# Patient Record
Sex: FEMALE | Race: WHITE | NOT HISPANIC OR LATINO | Employment: PART TIME | URBAN - METROPOLITAN AREA
[De-identification: names, ages, dates, MRNs, and addresses within clinical notes are randomized per-mention and may not be internally consistent; named-entity substitution may affect disease eponyms.]

---

## 2018-07-17 ENCOUNTER — HOSPITAL ENCOUNTER (INPATIENT)
Facility: MEDICAL CENTER | Age: 43
LOS: 1 days | DRG: 392 | End: 2018-07-18
Attending: EMERGENCY MEDICINE | Admitting: HOSPITALIST

## 2018-07-17 DIAGNOSIS — D72.825 BANDEMIA: ICD-10-CM

## 2018-07-17 DIAGNOSIS — R10.84 GENERALIZED ABDOMINAL PAIN: ICD-10-CM

## 2018-07-17 PROBLEM — R19.7 NAUSEA VOMITING AND DIARRHEA: Status: ACTIVE | Noted: 2018-07-17

## 2018-07-17 PROBLEM — D72.829 LEUKOCYTOSIS: Status: ACTIVE | Noted: 2018-07-17

## 2018-07-17 PROBLEM — E87.6 HYPOKALEMIA: Status: ACTIVE | Noted: 2018-07-17

## 2018-07-17 PROBLEM — R00.0 TACHYCARDIA: Status: ACTIVE | Noted: 2018-07-17

## 2018-07-17 PROBLEM — R11.2 NAUSEA VOMITING AND DIARRHEA: Status: ACTIVE | Noted: 2018-07-17

## 2018-07-17 LAB
ALBUMIN SERPL BCP-MCNC: 4.1 G/DL (ref 3.2–4.9)
ALBUMIN/GLOB SERPL: 1.1 G/DL
ALP SERPL-CCNC: 49 U/L (ref 30–99)
ALT SERPL-CCNC: 15 U/L (ref 2–50)
ANION GAP SERPL CALC-SCNC: 11 MMOL/L (ref 0–11.9)
APPEARANCE UR: ABNORMAL
AST SERPL-CCNC: 19 U/L (ref 12–45)
BASOPHILS # BLD AUTO: 0 % (ref 0–1.8)
BASOPHILS # BLD: 0 K/UL (ref 0–0.12)
BILIRUB SERPL-MCNC: 0.9 MG/DL (ref 0.1–1.5)
BUN SERPL-MCNC: 7 MG/DL (ref 8–22)
CALCIUM SERPL-MCNC: 8.9 MG/DL (ref 8.4–10.2)
CHLORIDE SERPL-SCNC: 100 MMOL/L (ref 96–112)
CO2 SERPL-SCNC: 21 MMOL/L (ref 20–33)
COLOR UR: ABNORMAL
CREAT SERPL-MCNC: 0.76 MG/DL (ref 0.5–1.4)
DEPRECATED D DIMER PPP IA-ACNC: 2812 NG/ML(D-DU)
EOSINOPHIL # BLD AUTO: 0.13 K/UL (ref 0–0.51)
EOSINOPHIL NFR BLD: 1 % (ref 0–6.9)
ERYTHROCYTE [DISTWIDTH] IN BLOOD BY AUTOMATED COUNT: 39.9 FL (ref 35.9–50)
GLOBULIN SER CALC-MCNC: 3.7 G/DL (ref 1.9–3.5)
GLUCOSE SERPL-MCNC: 96 MG/DL (ref 65–99)
GLUCOSE UR STRIP.AUTO-MCNC: NEGATIVE MG/DL
HCT VFR BLD AUTO: 44.1 % (ref 37–47)
HGB BLD-MCNC: 15.5 G/DL (ref 12–16)
KETONES UR STRIP.AUTO-MCNC: >=160 MG/DL
LACTATE BLD-SCNC: 1.1 MMOL/L (ref 0.5–2)
LEUKOCYTE ESTERASE UR QL STRIP.AUTO: ABNORMAL
LIPASE SERPL-CCNC: 18 U/L (ref 7–58)
LYMPHOCYTES # BLD AUTO: 0.64 K/UL (ref 1–4.8)
LYMPHOCYTES NFR BLD: 5 % (ref 22–41)
MANUAL DIFF BLD: ABNORMAL
MCH RBC QN AUTO: 32.2 PG (ref 27–33)
MCHC RBC AUTO-ENTMCNC: 35.1 G/DL (ref 33.6–35)
MCV RBC AUTO: 91.5 FL (ref 81.4–97.8)
METAMYELOCYTES NFR BLD MANUAL: 1 %
MONOCYTES # BLD AUTO: 0.51 K/UL (ref 0–0.85)
MONOCYTES NFR BLD AUTO: 4 % (ref 0–13.4)
NEUTROPHILS # BLD AUTO: 11.39 K/UL (ref 2–7.15)
NEUTROPHILS NFR BLD: 44 % (ref 44–72)
NEUTS BAND NFR BLD MANUAL: 45 % (ref 0–10)
NITRITE UR QL STRIP.AUTO: NEGATIVE
NRBC # BLD AUTO: 0 K/UL
NRBC BLD-RTO: 0 /100 WBC
PH UR STRIP.AUTO: 6 [PH]
PLATELET # BLD AUTO: 306 K/UL (ref 164–446)
PLATELET BLD QL SMEAR: NORMAL
PMV BLD AUTO: 11.1 FL (ref 9–12.9)
POTASSIUM SERPL-SCNC: 3.2 MMOL/L (ref 3.6–5.5)
PROT SERPL-MCNC: 7.8 G/DL (ref 6–8.2)
PROT UR QL STRIP: 100 MG/DL
RBC # BLD AUTO: 4.82 M/UL (ref 4.2–5.4)
RBC BLD AUTO: NORMAL
RBC UR QL AUTO: ABNORMAL
SODIUM SERPL-SCNC: 132 MMOL/L (ref 135–145)
SP GR UR STRIP.AUTO: 1.01
TROPONIN I SERPL-MCNC: 0.02 NG/ML (ref 0–0.04)
WBC # BLD AUTO: 12.8 K/UL (ref 4.8–10.8)

## 2018-07-17 PROCEDURE — 80053 COMPREHEN METABOLIC PANEL: CPT

## 2018-07-17 PROCEDURE — 87040 BLOOD CULTURE FOR BACTERIA: CPT

## 2018-07-17 PROCEDURE — 700105 HCHG RX REV CODE 258: Performed by: HOSPITALIST

## 2018-07-17 PROCEDURE — 770001 HCHG ROOM/CARE - MED/SURG/GYN PRIV*

## 2018-07-17 PROCEDURE — 84484 ASSAY OF TROPONIN QUANT: CPT

## 2018-07-17 PROCEDURE — 83605 ASSAY OF LACTIC ACID: CPT

## 2018-07-17 PROCEDURE — 81002 URINALYSIS NONAUTO W/O SCOPE: CPT

## 2018-07-17 PROCEDURE — A9270 NON-COVERED ITEM OR SERVICE: HCPCS | Performed by: HOSPITALIST

## 2018-07-17 PROCEDURE — 99285 EMERGENCY DEPT VISIT HI MDM: CPT

## 2018-07-17 PROCEDURE — 96361 HYDRATE IV INFUSION ADD-ON: CPT

## 2018-07-17 PROCEDURE — 700105 HCHG RX REV CODE 258: Performed by: EMERGENCY MEDICINE

## 2018-07-17 PROCEDURE — 85007 BL SMEAR W/DIFF WBC COUNT: CPT

## 2018-07-17 PROCEDURE — 700101 HCHG RX REV CODE 250: Performed by: EMERGENCY MEDICINE

## 2018-07-17 PROCEDURE — 700102 HCHG RX REV CODE 250 W/ 637 OVERRIDE(OP): Performed by: HOSPITALIST

## 2018-07-17 PROCEDURE — 700111 HCHG RX REV CODE 636 W/ 250 OVERRIDE (IP): Performed by: HOSPITALIST

## 2018-07-17 PROCEDURE — 36415 COLL VENOUS BLD VENIPUNCTURE: CPT

## 2018-07-17 PROCEDURE — 83690 ASSAY OF LIPASE: CPT

## 2018-07-17 PROCEDURE — 700111 HCHG RX REV CODE 636 W/ 250 OVERRIDE (IP): Performed by: EMERGENCY MEDICINE

## 2018-07-17 PROCEDURE — 85027 COMPLETE CBC AUTOMATED: CPT

## 2018-07-17 PROCEDURE — 96365 THER/PROPH/DIAG IV INF INIT: CPT

## 2018-07-17 PROCEDURE — 85379 FIBRIN DEGRADATION QUANT: CPT

## 2018-07-17 PROCEDURE — 96375 TX/PRO/DX INJ NEW DRUG ADDON: CPT

## 2018-07-17 PROCEDURE — 99221 1ST HOSP IP/OBS SF/LOW 40: CPT | Performed by: HOSPITALIST

## 2018-07-17 PROCEDURE — 93005 ELECTROCARDIOGRAM TRACING: CPT | Performed by: EMERGENCY MEDICINE

## 2018-07-17 RX ORDER — AMOXICILLIN 250 MG
2 CAPSULE ORAL 2 TIMES DAILY
Status: DISCONTINUED | OUTPATIENT
Start: 2018-07-17 | End: 2018-07-18 | Stop reason: HOSPADM

## 2018-07-17 RX ORDER — SODIUM CHLORIDE 9 MG/ML
INJECTION, SOLUTION INTRAVENOUS CONTINUOUS
Status: DISCONTINUED | OUTPATIENT
Start: 2018-07-17 | End: 2018-07-18 | Stop reason: HOSPADM

## 2018-07-17 RX ORDER — SODIUM CHLORIDE 9 MG/ML
1000 INJECTION, SOLUTION INTRAVENOUS ONCE
Status: COMPLETED | OUTPATIENT
Start: 2018-07-17 | End: 2018-07-17

## 2018-07-17 RX ORDER — DIPHENHYDRAMINE HCL 25 MG
25 TABLET ORAL NIGHTLY PRN
COMMUNITY
End: 2020-08-13

## 2018-07-17 RX ORDER — MORPHINE SULFATE 4 MG/ML
4 INJECTION, SOLUTION INTRAMUSCULAR; INTRAVENOUS ONCE
Status: COMPLETED | OUTPATIENT
Start: 2018-07-17 | End: 2018-07-17

## 2018-07-17 RX ORDER — MORPHINE SULFATE 4 MG/ML
1 INJECTION, SOLUTION INTRAMUSCULAR; INTRAVENOUS
Status: DISCONTINUED | OUTPATIENT
Start: 2018-07-17 | End: 2018-07-18 | Stop reason: HOSPADM

## 2018-07-17 RX ORDER — ONDANSETRON 2 MG/ML
4 INJECTION INTRAMUSCULAR; INTRAVENOUS ONCE
Status: COMPLETED | OUTPATIENT
Start: 2018-07-17 | End: 2018-07-17

## 2018-07-17 RX ORDER — POLYETHYLENE GLYCOL 3350 17 G/17G
1 POWDER, FOR SOLUTION ORAL
Status: DISCONTINUED | OUTPATIENT
Start: 2018-07-17 | End: 2018-07-18 | Stop reason: HOSPADM

## 2018-07-17 RX ORDER — ONDANSETRON 2 MG/ML
4 INJECTION INTRAMUSCULAR; INTRAVENOUS EVERY 4 HOURS PRN
Status: DISCONTINUED | OUTPATIENT
Start: 2018-07-17 | End: 2018-07-18 | Stop reason: HOSPADM

## 2018-07-17 RX ORDER — ACETAMINOPHEN 325 MG/1
650 TABLET ORAL EVERY 6 HOURS PRN
Status: DISCONTINUED | OUTPATIENT
Start: 2018-07-17 | End: 2018-07-18 | Stop reason: HOSPADM

## 2018-07-17 RX ORDER — BISACODYL 10 MG
10 SUPPOSITORY, RECTAL RECTAL
Status: DISCONTINUED | OUTPATIENT
Start: 2018-07-17 | End: 2018-07-18 | Stop reason: HOSPADM

## 2018-07-17 RX ORDER — POTASSIUM CHLORIDE 7.45 MG/ML
10 INJECTION INTRAVENOUS
Status: COMPLETED | OUTPATIENT
Start: 2018-07-17 | End: 2018-07-17

## 2018-07-17 RX ORDER — LEVOFLOXACIN 5 MG/ML
750 INJECTION, SOLUTION INTRAVENOUS ONCE
Status: COMPLETED | OUTPATIENT
Start: 2018-07-17 | End: 2018-07-17

## 2018-07-17 RX ORDER — HEPARIN SODIUM 5000 [USP'U]/ML
5000 INJECTION, SOLUTION INTRAVENOUS; SUBCUTANEOUS EVERY 8 HOURS
Status: DISCONTINUED | OUTPATIENT
Start: 2018-07-17 | End: 2018-07-18 | Stop reason: HOSPADM

## 2018-07-17 RX ADMIN — ONDANSETRON 4 MG: 2 INJECTION INTRAMUSCULAR; INTRAVENOUS at 15:55

## 2018-07-17 RX ADMIN — POTASSIUM CHLORIDE 10 MEQ: 7.46 INJECTION, SOLUTION INTRAVENOUS at 19:37

## 2018-07-17 RX ADMIN — MORPHINE SULFATE 4 MG: 4 INJECTION INTRAVENOUS at 15:55

## 2018-07-17 RX ADMIN — POTASSIUM CHLORIDE 10 MEQ: 7.46 INJECTION, SOLUTION INTRAVENOUS at 22:36

## 2018-07-17 RX ADMIN — LEVOFLOXACIN 750 MG: 5 INJECTION, SOLUTION INTRAVENOUS at 20:42

## 2018-07-17 RX ADMIN — POTASSIUM CHLORIDE 10 MEQ: 7.46 INJECTION, SOLUTION INTRAVENOUS at 20:33

## 2018-07-17 RX ADMIN — ACETAMINOPHEN 650 MG: 325 TABLET, FILM COATED ORAL at 19:35

## 2018-07-17 RX ADMIN — MORPHINE SULFATE 1 MG: 4 INJECTION INTRAVENOUS at 21:31

## 2018-07-17 RX ADMIN — SODIUM CHLORIDE 1000 ML: 9 INJECTION, SOLUTION INTRAVENOUS at 18:03

## 2018-07-17 RX ADMIN — POTASSIUM CHLORIDE 10 MEQ: 7.46 INJECTION, SOLUTION INTRAVENOUS at 21:35

## 2018-07-17 RX ADMIN — METRONIDAZOLE 500 MG: 500 INJECTION, SOLUTION INTRAVENOUS at 18:03

## 2018-07-17 RX ADMIN — ONDANSETRON 4 MG: 2 INJECTION INTRAMUSCULAR; INTRAVENOUS at 22:00

## 2018-07-17 RX ADMIN — SODIUM CHLORIDE 1000 ML: 9 INJECTION, SOLUTION INTRAVENOUS at 15:30

## 2018-07-17 ASSESSMENT — ENCOUNTER SYMPTOMS
COUGH: 0
WEAKNESS: 1
SEIZURES: 0
DIZZINESS: 0
FEVER: 0
BLOOD IN STOOL: 0
BLURRED VISION: 0
HEADACHES: 0
FOCAL WEAKNESS: 0
NAUSEA: 1
DIARRHEA: 1
LOSS OF CONSCIOUSNESS: 0
SHORTNESS OF BREATH: 0
EYE PAIN: 0
VOMITING: 1
ABDOMINAL PAIN: 1

## 2018-07-17 ASSESSMENT — PAIN SCALES - GENERAL
PAINLEVEL_OUTOF10: 8

## 2018-07-17 ASSESSMENT — COGNITIVE AND FUNCTIONAL STATUS - GENERAL
DAILY ACTIVITIY SCORE: 24
SUGGESTED CMS G CODE MODIFIER MOBILITY: CH
SUGGESTED CMS G CODE MODIFIER DAILY ACTIVITY: CH
MOBILITY SCORE: 24

## 2018-07-17 ASSESSMENT — LIFESTYLE VARIABLES
TOTAL SCORE: 0
TOTAL SCORE: 0
HAVE PEOPLE ANNOYED YOU BY CRITICIZING YOUR DRINKING: NO
HAVE YOU EVER FELT YOU SHOULD CUT DOWN ON YOUR DRINKING: NO
EVER HAD A DRINK FIRST THING IN THE MORNING TO STEADY YOUR NERVES TO GET RID OF A HANGOVER: NO
CONSUMPTION TOTAL: NEGATIVE
ON A TYPICAL DAY WHEN YOU DRINK ALCOHOL HOW MANY DRINKS DO YOU HAVE: 1
HOW MANY TIMES IN THE PAST YEAR HAVE YOU HAD 5 OR MORE DRINKS IN A DAY: 0
EVER FELT BAD OR GUILTY ABOUT YOUR DRINKING: NO
TOTAL SCORE: 0
AVERAGE NUMBER OF DAYS PER WEEK YOU HAVE A DRINK CONTAINING ALCOHOL: 1
ALCOHOL_USE: YES
EVER_SMOKED: NEVER

## 2018-07-17 ASSESSMENT — PATIENT HEALTH QUESTIONNAIRE - PHQ9
1. LITTLE INTEREST OR PLEASURE IN DOING THINGS: NOT AT ALL
SUM OF ALL RESPONSES TO PHQ9 QUESTIONS 1 AND 2: 0
2. FEELING DOWN, DEPRESSED, IRRITABLE, OR HOPELESS: NOT AT ALL

## 2018-07-17 NOTE — ED PROVIDER NOTES
"ED Provider Note  CHIEF COMPLAINT  Chief Complaint   Patient presents with   • Abdominal Pain   • N/V   • Diarrhea       HPI  Mary Galo is a 42 y.o. female who presents planing of some intermittent crampy abdominal pain associated with nausea, vomiting, diarrhea.  Both her and her mother eats locks or uncooked salmon a few days ago and both have had similar symptoms but the mother did not have quite as severe the symptoms.  Patient still has her gallbladder and appendix.  No localizing pain.  It is kind of diffuse intermittent crampy.  She has had some general muscle aches in her upper back.  Non-smoker.  No history of heart disease.  No diabetes.    REVIEW OF SYSTEMS  No headache, no jaw pain, no urinary symptoms.    ALL OTHER SYSTEMS NEGATIVE    ALLERGIES  No Known Allergies    CURRENT MEDICATIONS  Home Medications     Reviewed by Bakari Alexander (Pharmacy Tech) on 07/17/18 at 1504  Med List Status: Complete   Medication Last Dose Status   diphenhydrAMINE (BENADRYL) 25 MG Tab 7/16/2018 Active   MELATONIN PO 7/16/2018 Active                PAST MEDICAL HISTORY  Negative    SURGICAL HISTORY  No previous abdominal surgery    FAMILY HISTORY  Negative    SOCIAL HISTORY  Negative    PHYSICAL EXAM  GENERAL: Al alert dehydrated female with dry mucous membranes.  VITAL SIGNS: /69   Pulse (!) 103   Temp 37.3 °C (99.1 °F)   Resp 18   Ht 1.626 m (5' 4\")   Wt 70.5 kg (155 lb 6.8 oz)   SpO2 96%   BMI 26.68 kg/m²   Constitutional: Alert dehydrated female with dry mucous membranes.  Adult   HENT: Scalp is normal size and nontender. Ears are clear. Nose is clear. Throat is clear with no stridor no drooling no trismus. Teeth are all intact.  Eyes: Pupils equal round and reactive to light, extraocular motor fall. There is no scleral icterus.  Neck: Neck is supple and nontender. There is no meningismus. No adenitis. No thyromegaly.  Lymphatic: No adenopathy.   Cardiovascular: Heart regular rhythm without " murmurs or gallops   Thorax & Lungs: No chest wall tenderness. Lungs are clear. Patient has good breath sounds bilateral. No rales, no rhonchi, no wheezes.  Abdomen: Abdomen is soft, nontender, not rigid, no guarding, and no organomegaly. There is no palpable hernia   Skin: Warm, pink, and dry with no erythema and no rash.   Back: Nontender, no midline bony tenderness, no flank tenderness.  Genitalia no lower abdominal pain and no lower abdominal tenderness and no vaginal bleeding no vaginal discharge.  Pelvic exam has been deferred..                                               Extremities: Full range of motion  No tenderness to palpation and no deformities noted. No calf or thigh swelling. No calf or thigh tenderness. No clinical DVT.  Neurologic: Alert & oriented . Cranial nerves are grossly intact as tested. Patient moves all 4 extremities well. Patient has good strong flexion and extension of the ankle joints knee joints hip joints and elbow joints. Sensation is normal and symmetrical in the upper and lower extremities.   Psychiatric: Patient is alert oriented coherent and rational.     COURSE & MEDICAL DECISION MAKING  Patient had some uncooked salmon a few days ago and has had some nausea, vomiting, diarrhea, with abdominal cramps over the last few days.  Differential diagnosis: Abdominal pain, gastritis, hepatitis, pancreatitis, irritable bowel syndrome, colitis, enteritis, etc.    Plan: #1 IV #2 bolus of IV fluids for rehydration.  The patient appears dehydrated with dry mucous membranes.  3.  Intravenous Zofran and morphine.  4.  Lab evaluation including CBC, CMP, lipase, troponin, chest x-ray and EKG.    Laboratory and reexamination: The patient responded well to the bolus of IV fluids for her dehydration.  She appears much better hydrated.  White count is 13,000.  No anemia patient has a significant bandemia of 45%.  Chemistry panel is unremarkable with no renal or liver dysfunction.  Ponin is negative.   Lipase is normal no pancreatitis.    Results for orders placed or performed during the hospital encounter of 07/17/18   CBC WITH DIFFERENTIAL   Result Value Ref Range    WBC 12.8 (H) 4.8 - 10.8 K/uL    RBC 4.82 4.20 - 5.40 M/uL    Hemoglobin 15.5 12.0 - 16.0 g/dL    Hematocrit 44.1 37.0 - 47.0 %    MCV 91.5 81.4 - 97.8 fL    MCH 32.2 27.0 - 33.0 pg    MCHC 35.1 (H) 33.6 - 35.0 g/dL    RDW 39.9 35.9 - 50.0 fL    Platelet Count 306 164 - 446 K/uL    MPV 11.1 9.0 - 12.9 fL    Nucleated RBC 0.00 /100 WBC    NRBC (Absolute) 0.00 K/uL    Neutrophils-Polys 44.00 44.00 - 72.00 %    Lymphocytes 5.00 (L) 22.00 - 41.00 %    Monocytes 4.00 0.00 - 13.40 %    Eosinophils 1.00 0.00 - 6.90 %    Basophils 0.00 0.00 - 1.80 %    Neutrophils (Absolute) 11.39 (H) 2.00 - 7.15 K/uL    Lymphs (Absolute) 0.64 (L) 1.00 - 4.80 K/uL    Monos (Absolute) 0.51 0.00 - 0.85 K/uL    Eos (Absolute) 0.13 0.00 - 0.51 K/uL    Baso (Absolute) 0.00 0.00 - 0.12 K/uL   COMP METABOLIC PANEL   Result Value Ref Range    Sodium 132 (L) 135 - 145 mmol/L    Potassium 3.2 (L) 3.6 - 5.5 mmol/L    Chloride 100 96 - 112 mmol/L    Co2 21 20 - 33 mmol/L    Anion Gap 11.0 0.0 - 11.9    Glucose 96 65 - 99 mg/dL    Bun 7 (L) 8 - 22 mg/dL    Creatinine 0.76 0.50 - 1.40 mg/dL    Calcium 8.9 8.4 - 10.2 mg/dL    AST(SGOT) 19 12 - 45 U/L    ALT(SGPT) 15 2 - 50 U/L    Alkaline Phosphatase 49 30 - 99 U/L    Total Bilirubin 0.9 0.1 - 1.5 mg/dL    Albumin 4.1 3.2 - 4.9 g/dL    Total Protein 7.8 6.0 - 8.2 g/dL    Globulin 3.7 (H) 1.9 - 3.5 g/dL    A-G Ratio 1.1 g/dL   DIFFERENTIAL MANUAL   Result Value Ref Range    Bands-Stabs 45.00 (H) 0.00 - 10.00 %    Metamyelocytes 1.00 %    Manual Diff Status PERFORMED    PLATELET ESTIMATE   Result Value Ref Range    Plt Estimation Normal    MORPHOLOGY   Result Value Ref Range    RBC Morphology Normal    ESTIMATED GFR   Result Value Ref Range    GFR If African American >60 >60 mL/min/1.73 m 2    GFR If Non African American >60 >60 mL/min/1.73  m 2   LIPASE   Result Value Ref Range    Lipase 18 7 - 58 U/L   TROPONIN   Result Value Ref Range    Troponin I 0.02 0.00 - 0.04 ng/mL   POC UA   Result Value Ref Range    POC Color Tania     POC Appearance Slightly Cloudy (A)     POC Glucose Negative Negative mg/dL    POC Ketones >=160 (A) Negative mg/dL    POC Specific Gravity 1.010 1.005 - 1.030    POC Blood Moderate (A) Negative    POC Urine PH 6.0 5.0 - 8.0    POC Protein 100 (A) Negative mg/dL    POC Nitrites Negative Negative    POC Leukocyte Esterase Trace (A) Negative      Because of the elevated white count and the significant bandemia and the dehydration the patient will be admitted for further evaluation.  Hospitalist has been called the case discussed.  The patient stable on admission after 5 PM.  FINAL IMPRESSION  1.  Vomiting and diarrhea  2.  Abdominal cramps  3.  Significant bandemia  4.  Dehydration      Electronically signed by: Gary Gansert, 7/17/2018 /5 PM PM

## 2018-07-17 NOTE — ED NOTES
Rounded on patient in lobby.  Apologized for wait and assured that she would be roomed as soon as possible.

## 2018-07-18 VITALS
WEIGHT: 155.42 LBS | TEMPERATURE: 98.5 F | SYSTOLIC BLOOD PRESSURE: 96 MMHG | RESPIRATION RATE: 17 BRPM | HEART RATE: 77 BPM | BODY MASS INDEX: 26.53 KG/M2 | HEIGHT: 64 IN | OXYGEN SATURATION: 98 % | DIASTOLIC BLOOD PRESSURE: 53 MMHG

## 2018-07-18 PROBLEM — D72.829 LEUKOCYTOSIS: Status: RESOLVED | Noted: 2018-07-17 | Resolved: 2018-07-18

## 2018-07-18 PROBLEM — R00.0 TACHYCARDIA: Status: RESOLVED | Noted: 2018-07-17 | Resolved: 2018-07-18

## 2018-07-18 PROBLEM — E87.6 HYPOKALEMIA: Status: RESOLVED | Noted: 2018-07-17 | Resolved: 2018-07-18

## 2018-07-18 PROBLEM — R19.7 NAUSEA VOMITING AND DIARRHEA: Status: RESOLVED | Noted: 2018-07-17 | Resolved: 2018-07-18

## 2018-07-18 PROBLEM — R11.2 NAUSEA VOMITING AND DIARRHEA: Status: RESOLVED | Noted: 2018-07-17 | Resolved: 2018-07-18

## 2018-07-18 PROBLEM — R10.84 GENERALIZED ABDOMINAL PAIN: Status: RESOLVED | Noted: 2018-07-17 | Resolved: 2018-07-18

## 2018-07-18 LAB
ALBUMIN SERPL BCP-MCNC: 2.7 G/DL (ref 3.2–4.9)
ALBUMIN/GLOB SERPL: 1 G/DL
ALP SERPL-CCNC: 35 U/L (ref 30–99)
ALT SERPL-CCNC: 10 U/L (ref 2–50)
ANION GAP SERPL CALC-SCNC: 6 MMOL/L (ref 0–11.9)
AST SERPL-CCNC: 12 U/L (ref 12–45)
BILIRUB SERPL-MCNC: 0.7 MG/DL (ref 0.1–1.5)
BUN SERPL-MCNC: <5 MG/DL (ref 8–22)
CALCIUM SERPL-MCNC: 7.2 MG/DL (ref 8.4–10.2)
CHLORIDE SERPL-SCNC: 111 MMOL/L (ref 96–112)
CO2 SERPL-SCNC: 18 MMOL/L (ref 20–33)
CREAT SERPL-MCNC: 0.53 MG/DL (ref 0.5–1.4)
ERYTHROCYTE [DISTWIDTH] IN BLOOD BY AUTOMATED COUNT: 42.2 FL (ref 35.9–50)
GLOBULIN SER CALC-MCNC: 2.7 G/DL (ref 1.9–3.5)
GLUCOSE SERPL-MCNC: 81 MG/DL (ref 65–99)
HCT VFR BLD AUTO: 34.8 % (ref 37–47)
HGB BLD-MCNC: 12.1 G/DL (ref 12–16)
MCH RBC QN AUTO: 31.9 PG (ref 27–33)
MCHC RBC AUTO-ENTMCNC: 34.4 G/DL (ref 33.6–35)
MCV RBC AUTO: 92.8 FL (ref 81.4–97.8)
PLATELET # BLD AUTO: 245 K/UL (ref 164–446)
PMV BLD AUTO: 11 FL (ref 9–12.9)
POTASSIUM SERPL-SCNC: 3.3 MMOL/L (ref 3.6–5.5)
PROT SERPL-MCNC: 5.4 G/DL (ref 6–8.2)
RBC # BLD AUTO: 3.73 M/UL (ref 4.2–5.4)
SODIUM SERPL-SCNC: 135 MMOL/L (ref 135–145)
WBC # BLD AUTO: 9.2 K/UL (ref 4.8–10.8)

## 2018-07-18 PROCEDURE — 700102 HCHG RX REV CODE 250 W/ 637 OVERRIDE(OP): Performed by: HOSPITALIST

## 2018-07-18 PROCEDURE — A9270 NON-COVERED ITEM OR SERVICE: HCPCS | Performed by: HOSPITALIST

## 2018-07-18 PROCEDURE — 80053 COMPREHEN METABOLIC PANEL: CPT

## 2018-07-18 PROCEDURE — 700111 HCHG RX REV CODE 636 W/ 250 OVERRIDE (IP): Performed by: HOSPITALIST

## 2018-07-18 PROCEDURE — 700105 HCHG RX REV CODE 258: Performed by: HOSPITALIST

## 2018-07-18 PROCEDURE — 87045 FECES CULTURE AEROBIC BACT: CPT

## 2018-07-18 PROCEDURE — 87899 AGENT NOS ASSAY W/OPTIC: CPT

## 2018-07-18 PROCEDURE — 85027 COMPLETE CBC AUTOMATED: CPT

## 2018-07-18 PROCEDURE — 99239 HOSP IP/OBS DSCHRG MGMT >30: CPT | Performed by: HOSPITALIST

## 2018-07-18 PROCEDURE — 87046 STOOL CULTR AEROBIC BACT EA: CPT

## 2018-07-18 RX ORDER — SODIUM CHLORIDE 9 MG/ML
1000 INJECTION, SOLUTION INTRAVENOUS ONCE
Status: COMPLETED | OUTPATIENT
Start: 2018-07-18 | End: 2018-07-18

## 2018-07-18 RX ORDER — SODIUM CHLORIDE 9 MG/ML
500 INJECTION, SOLUTION INTRAVENOUS ONCE
Status: COMPLETED | OUTPATIENT
Start: 2018-07-18 | End: 2018-07-18

## 2018-07-18 RX ADMIN — MORPHINE SULFATE 1 MG: 4 INJECTION INTRAVENOUS at 04:42

## 2018-07-18 RX ADMIN — SODIUM CHLORIDE 1000 ML: 9 INJECTION, SOLUTION INTRAVENOUS at 16:47

## 2018-07-18 RX ADMIN — ONDANSETRON 4 MG: 2 INJECTION INTRAMUSCULAR; INTRAVENOUS at 07:54

## 2018-07-18 RX ADMIN — MORPHINE SULFATE 1 MG: 4 INJECTION INTRAVENOUS at 07:54

## 2018-07-18 RX ADMIN — SODIUM CHLORIDE 500 ML: 9 INJECTION, SOLUTION INTRAVENOUS at 03:33

## 2018-07-18 RX ADMIN — SODIUM CHLORIDE: 9 INJECTION, SOLUTION INTRAVENOUS at 01:16

## 2018-07-18 RX ADMIN — SODIUM CHLORIDE: 9 INJECTION, SOLUTION INTRAVENOUS at 09:35

## 2018-07-18 RX ADMIN — ACETAMINOPHEN 650 MG: 325 TABLET, FILM COATED ORAL at 11:54

## 2018-07-18 ASSESSMENT — PAIN SCALES - GENERAL
PAINLEVEL_OUTOF10: 8
PAINLEVEL_OUTOF10: 9

## 2018-07-18 NOTE — PROGRESS NOTES
Pt tolerating full liquid diet without any n/v. States pain to abdomen tolerable. Wanting to DC home with . BPs 90s/40s. VSS otherwise. MD ordered 1 L bolus NS.

## 2018-07-18 NOTE — DIETARY
Nutrition services: Day 1 of admit.  Mary Galo is a 42 y.o. female with admitting DX of N/V/D.    Consult received for Poor PO PTA.     Per Chart review, pt presented with N/V/D since Saturday when she and her mother consumed raw salmon and most likely the cause of the poor PO PTA. Both patients presented to acute though pt's mothers are not as severe. MD assessment and plan, concern for vibrio, plan to use antiemetics PRN, IVF hydration, pain control and NPO at this time RT N/V/D day #1. RD to follow clinical course and await diet advancement.     Labs (Alb 2.7 L K 3.3 L Ca 7.2 L BUN <5 L), MAR and Chart reviewed.     Malnutrition Risk: poor PO PTA per pt     Recommendations/Plan:  1. Nutrition per MD discretion.   2. Encourage intake of 50% or greater when diet advanced.   3. Document intake of all meals  as % taken in ADL's to provide interdisciplinary communication across all shifts.   4. Monitor weight.  5. Nutrition rep will continue to see patient for ongoing meal and snack preferences when diet advanced.

## 2018-07-18 NOTE — CARE PLAN
Problem: Nutritional:  Goal: Achieve adequate nutritional intake  Patient will consume 50% or greater of meals when diet advanced.   Outcome: NOT MET

## 2018-07-18 NOTE — ASSESSMENT & PLAN NOTE
Likely from acute infection  Hold off on abx at this time, patient otherwise stable  Repeat labs in the am

## 2018-07-18 NOTE — ASSESSMENT & PLAN NOTE
Patient coming in with acute nausea, vomiting and diarrhea for the past 2-3 days, states she had some raw fish for dinner with her daughter few days ago, otherwise healthy, no PMHx, no blood in stool noted  Likely from consuming uncooked raw fish- concern for Vibrio parahaemolyticus- self limiting  At this time admit for IV fluids and electrolyte hydration  Antiemetics PRN  Monitor closely

## 2018-07-18 NOTE — H&P
Hospital Medicine History and Physical    Date of Service  7/17/2018    Chief Complaint  Chief Complaint   Patient presents with   • Abdominal Pain   • N/V   • Diarrhea       History of Presenting Illness  42 y.o. female who presented 7/17/2018 with nausea/vomiting and diarrhea x 2-3 days. She and her mother consumed raw salmon a few days ago and both have similar symptoms however her mothers isn't as severe. She has not been able to keep anything down, feeling weak, having generalized crampy abd pain, 6/10, non radiaiting. No aggravating or relieving factors.   No fevers, no chills, no blood in stool      Primary Care Physician  No primary care provider on file.    Consultants  none    Code Status  full    Review of Systems  Review of Systems   Constitutional: Positive for malaise/fatigue. Negative for fever.   HENT: Negative for congestion and hearing loss.    Eyes: Negative for blurred vision and pain.   Respiratory: Negative for cough and shortness of breath.    Cardiovascular: Negative for chest pain and leg swelling.   Gastrointestinal: Positive for abdominal pain, diarrhea, nausea and vomiting. Negative for blood in stool.   Genitourinary: Negative for dysuria and frequency.   Neurological: Positive for weakness. Negative for dizziness, focal weakness, seizures, loss of consciousness and headaches.        Past Medical History  No past medical history on file.  Reviewed and no pmhx noted per patient  Surgical History  No past surgical history on file.  No hx of surgeries in the past  Medications  No current facility-administered medications on file prior to encounter.      No current outpatient prescriptions on file prior to encounter.   takes melatonin for sleep as needed    Family History  No family history on file.  Reviewed with patient and non contributory  Social History  Social History   Substance Use Topics   • Smoking status: Not on file   • Smokeless tobacco: Not on file   • Alcohol use Not on file    does not smoke, social drinks, no illicit drugs    Allergies  No Known Allergies     Physical Exam  Laboratory   Hemodynamics  Temp (24hrs), Av.3 °C (99.1 °F), Min:37.3 °C (99.1 °F), Max:37.3 °C (99.1 °F)   Temperature: 37.3 °C (99.1 °F)  Pulse  Av  Min: 103  Max: 103    Blood Pressure: 104/69      Respiratory      Respiration: 18, Pulse Oximetry: 96 %             Physical Exam   Constitutional: She is oriented to person, place, and time. She appears well-developed and well-nourished. No distress.   HENT:   Head: Normocephalic and atraumatic.   Eyes: Conjunctivae and EOM are normal. Pupils are equal, round, and reactive to light. No scleral icterus.   Neck: Normal range of motion. Neck supple. No JVD present. No thyromegaly present.   Cardiovascular: Regular rhythm.    No murmur heard.  tachycardia   Pulmonary/Chest: Effort normal and breath sounds normal. No respiratory distress. She has no wheezes. She has no rales.   Abdominal: Soft. Bowel sounds are normal. She exhibits no distension. There is tenderness. There is no rebound.   Musculoskeletal: Normal range of motion. She exhibits no edema or tenderness.   Lymphadenopathy:     She has no cervical adenopathy.   Neurological: She is alert and oriented to person, place, and time. She exhibits normal muscle tone.   Skin: Skin is warm and dry. No rash noted. No erythema.   Psychiatric: She has a normal mood and affect.       Recent Labs      18   1351   WBC  12.8*   RBC  4.82   HEMOGLOBIN  15.5   HEMATOCRIT  44.1   MCV  91.5   MCH  32.2   MCHC  35.1*   RDW  39.9   PLATELETCT  306   MPV  11.1     Recent Labs      18   1351   SODIUM  132*   POTASSIUM  3.2*   CHLORIDE  100   CO2  21   GLUCOSE  96   BUN  7*   CREATININE  0.76   CALCIUM  8.9     Recent Labs      18   1351   ALTSGPT  15   ASTSGOT  19   ALKPHOSPHAT  49   TBILIRUBIN  0.9   LIPASE  18   GLUCOSE  96                 Lab Results   Component Value Date    TROPONINI 0.02 2018      Urinalysis:  No results found for: SPECGRAVITY, GLUCOSEUR, KETONES, NITRITE, WBCURINE, RBCURINE, BACTERIA, EPITHELCELL     Imaging  none   Assessment/Plan     I anticipate this patient will require at least two midnights for appropriate medical management, necessitating inpatient admission.    * Nausea vomiting and diarrhea   Assessment & Plan    Patient coming in with acute nausea, vomiting and diarrhea for the past 2-3 days, states she had some raw fish for dinner with her daughter few days ago, otherwise healthy, no PMHx, no blood in stool noted  Likely from consuming uncooked raw fish- concern for Vibrio parahaemolyticus- self limiting  At this time admit for IV fluids and electrolyte hydration  Antiemetics PRN  Monitor closely            Generalized abdominal pain   Assessment & Plan    Cramping abd pain after consuming raw fish a few days ago, + N/V/D as well  Pain control  IVF hydration  monitor        Tachycardia   Assessment & Plan    2/2 dehydration from the above  IVF  monitor        Hypokalemia   Assessment & Plan    2/2 N/V/D,   Replaced and monitor        Leukocytosis   Assessment & Plan    Likely from acute infection  Hold off on abx at this time, patient otherwise stable  Repeat labs in the am            VTE prophylaxis: heparin

## 2018-07-18 NOTE — CARE PLAN
Problem: Safety  Goal: Will remain free from falls  Outcome: PROGRESSING AS EXPECTED  Pt expressed some dizziness and weakness. Discussed with patient to use call light before getting out of bed and wait for assistance.     Problem: Knowledge Deficit  Goal: Knowledge of disease process/condition, treatment plan, diagnostic tests, and medications will improve  Outcome: PROGRESSING AS EXPECTED  Discussed plan of care with patient and family members. All questions answered.

## 2018-07-18 NOTE — ASSESSMENT & PLAN NOTE
Cramping abd pain after consuming raw fish a few days ago, + N/V/D as well  Pain control  IVF hydration  monitor

## 2018-07-19 LAB
E COLI SXT1+2 STL IA: NORMAL
SIGNIFICANT IND 70042: NORMAL
SITE SITE: NORMAL
SOURCE SOURCE: NORMAL

## 2018-07-19 NOTE — DISCHARGE INSTRUCTIONS
Discharge Instructions    Discharged to home by car with relative. Discharged via wheelchair, hospital escort: Yes.  Special equipment needed: Not Applicable    Be sure to schedule a follow-up appointment with your primary care doctor or any specialists as instructed.     Discharge Plan:   Diet Plan: Discussed  Activity Level: Discussed  Confirmed Follow up Appointment: Patient to Call and Schedule Appointment  Confirmed Symptoms Management: Discussed  Medication Reconciliation Updated: Yes  Influenza Vaccine Indication: Patient Refuses    I understand that a diet low in cholesterol, fat, and sodium is recommended for good health. Unless I have been given specific instructions below for another diet, I accept this instruction as my diet prescription.   Other diet: as tolerated. Start with clear liquids    Special Instructions: None    · Is patient discharged on Warfarin / Coumadin?   No     Depression / Suicide Risk    As you are discharged from this [facility] facility, it is important to learn how to keep safe from harming yourself.    Recognize the warning signs:  · Abrupt changes in personality, positive or negative- including increase in energy   · Giving away possessions  · Change in eating patterns- significant weight changes-  positive or negative  · Change in sleeping patterns- unable to sleep or sleeping all the time   · Unwillingness or inability to communicate  · Depression  · Unusual sadness, discouragement and loneliness  · Talk of wanting to die  · Neglect of personal appearance   · Rebelliousness- reckless behavior  · Withdrawal from people/activities they love  · Confusion- inability to concentrate     If you or a loved one observes any of these behaviors or has concerns about self-harm, here's what you can do:  · Talk about it- your feelings and reasons for harming yourself  · Remove any means that you might use to hurt yourself (examples: pills, rope, extension cords, firearm)  · Get  professional help from the community (Mental Health, Substance Abuse, psychological counseling)  · Do not be alone:Call your Safe Contact- someone whom you trust who will be there for you.  · Call your local CRISIS HOTLINE 727-6630 or 302-084-7616  · Call your local Children's Mobile Crisis Response Team [place] (877) 721-4663 or www.[employer] [place]  · Call the toll free National Suicide Prevention Hotlines   · National Suicide Prevention Lifeline 332-881-JGUT (8293)  · National Hope Line Network 800-SUICIDE (683-7417)

## 2018-07-19 NOTE — PROGRESS NOTES
Pt given all discharge information and education. Pt still with bouts of diarrhea, nausea and vomiting resolved. Tolerating clear liquid diet. Transferred home with  and mother.

## 2018-07-19 NOTE — DISCHARGE SUMMARY
Discharge Summary    CHIEF COMPLAINT ON ADMISSION  Chief Complaint   Patient presents with   • Abdominal Pain   • N/V   • Diarrhea       Reason for Admission  food poisioning; back pain; diarrha    Admission Date  7/17/2018    CODE STATUS  Full Code    HPI & HOSPITAL COURSE  This is a 42 y.o. female with no significant past medical history presented to the ER with complaint of nausea vomiting and diarrhea that started about 2-3 days ago after patient apparently had some kind of overall fish for dinner. At the time when she started having diarrhea she denied any blood or mucus in her stools. Nevertheless because she was unable to keep anything down with generalized cramping in her abdomen patient was admitted for supportive therapy including IV hydration and closer monitoring of her labs including a CBC and a CMP. Fortunately her leukocytosis did resolve from yesterday, patient says her diarrhea frequency has subsided to only having 1-2 watery bowel movements a day. She no longer complains of having a crampy abdominal discomfort. Blood cultures thus far have been negative. Patient denies fevers/chills, chest pain, shortness of breath or nausea/vommiting.   r    Therefore, she is discharged in good and stable condition to home with close outpatient follow-up.    The patient recovered much more quickly than anticipated on admission.    Discharge Date  7/18/2018    FOLLOW UP ITEMS POST DISCHARGE  PCP in 1 week    DISCHARGE DIAGNOSES  Principal Problem (Resolved):    Nausea vomiting and diarrhea POA: Unknown  Active Problems:    * No active hospital problems. *  Resolved Problems:    Leukocytosis POA: Unknown    Hypokalemia POA: Unknown    Tachycardia POA: Unknown    Generalized abdominal pain POA: Unknown      FOLLOW UP  No future appointments.  No follow-up provider specified.    MEDICATIONS ON DISCHARGE     Medication List      CONTINUE taking these medications      Instructions   diphenhydrAMINE 25 MG Tabs  Commonly  known as:  BENADRYL   Take 25 mg by mouth at bedtime as needed for Sleep.  Dose:  25 mg     MELATONIN PO   Take 1 Tab by mouth every bedtime.  Dose:  1 Tab            Allergies  No Known Allergies    DIET  Orders Placed This Encounter   Procedures   • Diet Order Regular     Standing Status:   Standing     Number of Occurrences:   1     Order Specific Question:   Diet:     Answer:   Regular [1]       ACTIVITY  As tolerated.  Weight bearing as tolerated    CONSULTATIONS  None    PROCEDURES  None    LABORATORY  Lab Results   Component Value Date    SODIUM 135 07/18/2018    POTASSIUM 3.3 (L) 07/18/2018    CHLORIDE 111 07/18/2018    CO2 18 (L) 07/18/2018    GLUCOSE 81 07/18/2018    BUN <5 (L) 07/18/2018    CREATININE 0.53 07/18/2018        Lab Results   Component Value Date    WBC 9.2 07/18/2018    HEMOGLOBIN 12.1 07/18/2018    HEMATOCRIT 34.8 (L) 07/18/2018    PLATELETCT 245 07/18/2018        Total time of the discharge process exceeds 35 minutes.

## 2018-07-21 LAB
BACTERIA STL CULT: NORMAL
E COLI SXT1+2 STL IA: NORMAL
SIGNIFICANT IND 70042: NORMAL
SITE SITE: NORMAL
SOURCE SOURCE: NORMAL

## 2018-07-22 LAB
BACTERIA BLD CULT: NORMAL
BACTERIA BLD CULT: NORMAL
SIGNIFICANT IND 70042: NORMAL
SIGNIFICANT IND 70042: NORMAL
SITE SITE: NORMAL
SITE SITE: NORMAL
SOURCE SOURCE: NORMAL
SOURCE SOURCE: NORMAL

## 2018-07-22 NOTE — DOCUMENTATION QUERY
DOCUMENTATION QUERY    PROVIDERS: Please select “Cosign w/ note”to reply to query.    To better represent the severity of illness of your patient, please review the following information and exercise your independent professional judgment in responding to this query.     Patient presented with nausea, vomiting and diarrhea after eating uncooked or undercooked fish as documented in the History and Physical and Progress Notes.     Based upon the clinical findings, risk factors, and treatment can a diagnosis be provided to support this finding?          The medical record reflects the following:   Clinical Findings  , WBC 12.8   Treatment  IV fluids, antiemetics   Risk Factors  consumption undercooked seafood   Location within medical record  History and Physical, Progress Notes and ED record     Thank you,     Sadia Barnes  HIM Coder  [phone]

## 2018-07-24 NOTE — ADDENDUM NOTE
Encounter addended by: Scarlet Barnes on: 7/24/2018  2:14 PM<BR>    Actions taken: Sign clinical note

## 2018-07-24 NOTE — ADDENDUM NOTE
Encounter addended by: Scarlet Barnes on: 7/24/2018  1:49 PM<BR>    Actions taken: Pend clinical note

## 2020-08-04 ENCOUNTER — OFFICE VISIT (OUTPATIENT)
Dept: ADMISSIONS | Facility: MEDICAL CENTER | Age: 45
End: 2020-08-04
Attending: SURGERY
Payer: MEDICAID

## 2020-08-04 DIAGNOSIS — Z01.812 PRE-OPERATIVE LABORATORY EXAMINATION: ICD-10-CM

## 2020-08-04 LAB
COVID ORDER STATUS COVID19: NORMAL
HCG SERPL QL: NEGATIVE

## 2020-08-04 PROCEDURE — 84703 CHORIONIC GONADOTROPIN ASSAY: CPT

## 2020-08-04 PROCEDURE — U0003 INFECTIOUS AGENT DETECTION BY NUCLEIC ACID (DNA OR RNA); SEVERE ACUTE RESPIRATORY SYNDROME CORONAVIRUS 2 (SARS-COV-2) (CORONAVIRUS DISEASE [COVID-19]), AMPLIFIED PROBE TECHNIQUE, MAKING USE OF HIGH THROUGHPUT TECHNOLOGIES AS DESCRIBED BY CMS-2020-01-R: HCPCS

## 2020-08-04 PROCEDURE — 36415 COLL VENOUS BLD VENIPUNCTURE: CPT

## 2020-08-04 RX ORDER — B-COMPLEX WITH VITAMIN C
1 TABLET ORAL DAILY
COMMUNITY
End: 2020-08-13

## 2020-08-04 RX ORDER — ACETAMINOPHEN 325 MG/1
325-650 TABLET ORAL PRN
COMMUNITY
End: 2020-08-19

## 2020-08-05 LAB
SARS-COV-2 RNA RESP QL NAA+PROBE: NOTDETECTED
SPECIMEN SOURCE: NORMAL

## 2020-08-07 ENCOUNTER — ANESTHESIA (OUTPATIENT)
Dept: SURGERY | Facility: MEDICAL CENTER | Age: 45
End: 2020-08-07
Payer: MEDICAID

## 2020-08-07 ENCOUNTER — ANESTHESIA EVENT (OUTPATIENT)
Dept: SURGERY | Facility: MEDICAL CENTER | Age: 45
End: 2020-08-07
Payer: MEDICAID

## 2020-08-07 ENCOUNTER — HOSPITAL ENCOUNTER (OUTPATIENT)
Facility: MEDICAL CENTER | Age: 45
End: 2020-08-07
Attending: SURGERY | Admitting: SURGERY
Payer: MEDICAID

## 2020-08-07 ENCOUNTER — APPOINTMENT (OUTPATIENT)
Dept: RADIOLOGY | Facility: MEDICAL CENTER | Age: 45
End: 2020-08-07
Attending: SURGERY
Payer: MEDICAID

## 2020-08-07 VITALS
WEIGHT: 171.3 LBS | RESPIRATION RATE: 15 BRPM | HEART RATE: 29 BPM | HEIGHT: 65 IN | TEMPERATURE: 97.2 F | OXYGEN SATURATION: 99 % | SYSTOLIC BLOOD PRESSURE: 102 MMHG | DIASTOLIC BLOOD PRESSURE: 58 MMHG | BODY MASS INDEX: 28.54 KG/M2

## 2020-08-07 DIAGNOSIS — C50.111 MALIGNANT NEOPLASM OF CENTRAL PORTION OF RIGHT FEMALE BREAST, UNSPECIFIED ESTROGEN RECEPTOR STATUS (HCC): ICD-10-CM

## 2020-08-07 LAB
HCG UR QL: NEGATIVE
PATHOLOGY CONSULT NOTE: NORMAL

## 2020-08-07 PROCEDURE — 81025 URINE PREGNANCY TEST: CPT

## 2020-08-07 PROCEDURE — A9270 NON-COVERED ITEM OR SERVICE: HCPCS

## 2020-08-07 PROCEDURE — 501497 HCHG SURGICLIP: Performed by: SURGERY

## 2020-08-07 PROCEDURE — 160036 HCHG PACU - EA ADDL 30 MINS PHASE I: Performed by: SURGERY

## 2020-08-07 PROCEDURE — C1788 PORT, INDWELLING, IMP: HCPCS | Performed by: SURGERY

## 2020-08-07 PROCEDURE — A9270 NON-COVERED ITEM OR SERVICE: HCPCS | Performed by: SURGERY

## 2020-08-07 PROCEDURE — 160029 HCHG SURGERY MINUTES - 1ST 30 MINS LEVEL 4: Performed by: SURGERY

## 2020-08-07 PROCEDURE — 160048 HCHG OR STATISTICAL LEVEL 1-5: Performed by: SURGERY

## 2020-08-07 PROCEDURE — 500389 HCHG DRAIN, RESERVOIR SUCT JP 100CC: Performed by: SURGERY

## 2020-08-07 PROCEDURE — 700111 HCHG RX REV CODE 636 W/ 250 OVERRIDE (IP): Performed by: ANESTHESIOLOGY

## 2020-08-07 PROCEDURE — 88342 IMHCHEM/IMCYTCHM 1ST ANTB: CPT

## 2020-08-07 PROCEDURE — 500371 HCHG DRAIN, BLAKE 10MM: Performed by: SURGERY

## 2020-08-07 PROCEDURE — 700105 HCHG RX REV CODE 258: Performed by: SURGERY

## 2020-08-07 PROCEDURE — 700101 HCHG RX REV CODE 250: Performed by: ANESTHESIOLOGY

## 2020-08-07 PROCEDURE — 700102 HCHG RX REV CODE 250 W/ 637 OVERRIDE(OP): Performed by: SURGERY

## 2020-08-07 PROCEDURE — A9270 NON-COVERED ITEM OR SERVICE: HCPCS | Performed by: ANESTHESIOLOGY

## 2020-08-07 PROCEDURE — 700101 HCHG RX REV CODE 250: Performed by: SURGERY

## 2020-08-07 PROCEDURE — 160002 HCHG RECOVERY MINUTES (STAT): Performed by: SURGERY

## 2020-08-07 PROCEDURE — 160041 HCHG SURGERY MINUTES - EA ADDL 1 MIN LEVEL 4: Performed by: SURGERY

## 2020-08-07 PROCEDURE — 501445 HCHG STAPLER, SKIN DISP: Performed by: SURGERY

## 2020-08-07 PROCEDURE — 700102 HCHG RX REV CODE 250 W/ 637 OVERRIDE(OP)

## 2020-08-07 PROCEDURE — 700105 HCHG RX REV CODE 258: Performed by: ANESTHESIOLOGY

## 2020-08-07 PROCEDURE — 160046 HCHG PACU - 1ST 60 MINS PHASE II: Performed by: SURGERY

## 2020-08-07 PROCEDURE — 88307 TISSUE EXAM BY PATHOLOGIST: CPT | Mod: 59

## 2020-08-07 PROCEDURE — 700111 HCHG RX REV CODE 636 W/ 250 OVERRIDE (IP): Performed by: SURGERY

## 2020-08-07 PROCEDURE — 38792 RA TRACER ID OF SENTINL NODE: CPT | Mod: RT

## 2020-08-07 PROCEDURE — 501838 HCHG SUTURE GENERAL: Performed by: SURGERY

## 2020-08-07 PROCEDURE — 700102 HCHG RX REV CODE 250 W/ 637 OVERRIDE(OP): Performed by: ANESTHESIOLOGY

## 2020-08-07 PROCEDURE — 160047 HCHG PACU  - EA ADDL 30 MINS PHASE II: Performed by: SURGERY

## 2020-08-07 PROCEDURE — 88331 PATH CONSLTJ SURG 1 BLK 1SPC: CPT

## 2020-08-07 PROCEDURE — 88341 IMHCHEM/IMCYTCHM EA ADD ANTB: CPT

## 2020-08-07 PROCEDURE — 160009 HCHG ANES TIME/MIN: Performed by: SURGERY

## 2020-08-07 PROCEDURE — 160035 HCHG PACU - 1ST 60 MINS PHASE I: Performed by: SURGERY

## 2020-08-07 PROCEDURE — 160025 RECOVERY II MINUTES (STATS): Performed by: SURGERY

## 2020-08-07 DEVICE — POWER PORTMRI COMPATABLE: Type: IMPLANTABLE DEVICE | Site: CHEST | Status: NON-FUNCTIONAL

## 2020-08-07 RX ORDER — ONDANSETRON 2 MG/ML
INJECTION INTRAMUSCULAR; INTRAVENOUS PRN
Status: DISCONTINUED | OUTPATIENT
Start: 2020-08-07 | End: 2020-08-07 | Stop reason: SURG

## 2020-08-07 RX ORDER — OXYCODONE HCL 5 MG/5 ML
SOLUTION, ORAL ORAL
Status: COMPLETED
Start: 2020-08-07 | End: 2020-08-07

## 2020-08-07 RX ORDER — ALPRAZOLAM 0.25 MG/1
0.25 TABLET ORAL ONCE
Status: COMPLETED | OUTPATIENT
Start: 2020-08-07 | End: 2020-08-07

## 2020-08-07 RX ORDER — SODIUM CHLORIDE, SODIUM LACTATE, POTASSIUM CHLORIDE, CALCIUM CHLORIDE 600; 310; 30; 20 MG/100ML; MG/100ML; MG/100ML; MG/100ML
INJECTION, SOLUTION INTRAVENOUS
Status: DISCONTINUED | OUTPATIENT
Start: 2020-08-07 | End: 2020-08-07 | Stop reason: SURG

## 2020-08-07 RX ORDER — KETOROLAC TROMETHAMINE 30 MG/ML
INJECTION, SOLUTION INTRAMUSCULAR; INTRAVENOUS PRN
Status: DISCONTINUED | OUTPATIENT
Start: 2020-08-07 | End: 2020-08-07 | Stop reason: SURG

## 2020-08-07 RX ORDER — LABETALOL HYDROCHLORIDE 5 MG/ML
5 INJECTION, SOLUTION INTRAVENOUS
Status: DISCONTINUED | OUTPATIENT
Start: 2020-08-07 | End: 2020-08-07 | Stop reason: HOSPADM

## 2020-08-07 RX ORDER — OXYCODONE HCL 5 MG/5 ML
10 SOLUTION, ORAL ORAL
Status: COMPLETED | OUTPATIENT
Start: 2020-08-07 | End: 2020-08-07

## 2020-08-07 RX ORDER — LIDOCAINE HYDROCHLORIDE 20 MG/ML
INJECTION, SOLUTION EPIDURAL; INFILTRATION; INTRACAUDAL; PERINEURAL PRN
Status: DISCONTINUED | OUTPATIENT
Start: 2020-08-07 | End: 2020-08-07 | Stop reason: SURG

## 2020-08-07 RX ORDER — HALOPERIDOL 5 MG/ML
1 INJECTION INTRAMUSCULAR
Status: DISCONTINUED | OUTPATIENT
Start: 2020-08-07 | End: 2020-08-07 | Stop reason: HOSPADM

## 2020-08-07 RX ORDER — DIPHENHYDRAMINE HYDROCHLORIDE 50 MG/ML
12.5 INJECTION INTRAMUSCULAR; INTRAVENOUS
Status: DISCONTINUED | OUTPATIENT
Start: 2020-08-07 | End: 2020-08-07 | Stop reason: HOSPADM

## 2020-08-07 RX ORDER — MIDAZOLAM HYDROCHLORIDE 1 MG/ML
1 INJECTION INTRAMUSCULAR; INTRAVENOUS
Status: DISCONTINUED | OUTPATIENT
Start: 2020-08-07 | End: 2020-08-07 | Stop reason: HOSPADM

## 2020-08-07 RX ORDER — HYDROMORPHONE HYDROCHLORIDE 1 MG/ML
0.4 INJECTION, SOLUTION INTRAMUSCULAR; INTRAVENOUS; SUBCUTANEOUS
Status: DISCONTINUED | OUTPATIENT
Start: 2020-08-07 | End: 2020-08-07 | Stop reason: HOSPADM

## 2020-08-07 RX ORDER — SODIUM CHLORIDE, SODIUM LACTATE, POTASSIUM CHLORIDE, CALCIUM CHLORIDE 600; 310; 30; 20 MG/100ML; MG/100ML; MG/100ML; MG/100ML
INJECTION, SOLUTION INTRAVENOUS CONTINUOUS
Status: DISCONTINUED | OUTPATIENT
Start: 2020-08-07 | End: 2020-08-07 | Stop reason: HOSPADM

## 2020-08-07 RX ORDER — SODIUM CHLORIDE, SODIUM LACTATE, POTASSIUM CHLORIDE, CALCIUM CHLORIDE 600; 310; 30; 20 MG/100ML; MG/100ML; MG/100ML; MG/100ML
INJECTION, SOLUTION INTRAVENOUS CONTINUOUS
Status: DISCONTINUED | OUTPATIENT
Start: 2020-08-07 | End: 2020-08-07

## 2020-08-07 RX ORDER — OXYCODONE HCL 5 MG/5 ML
5 SOLUTION, ORAL ORAL
Status: COMPLETED | OUTPATIENT
Start: 2020-08-07 | End: 2020-08-07

## 2020-08-07 RX ORDER — MIDAZOLAM HYDROCHLORIDE 1 MG/ML
INJECTION INTRAMUSCULAR; INTRAVENOUS PRN
Status: DISCONTINUED | OUTPATIENT
Start: 2020-08-07 | End: 2020-08-07 | Stop reason: SURG

## 2020-08-07 RX ORDER — DEXAMETHASONE SODIUM PHOSPHATE 4 MG/ML
INJECTION, SOLUTION INTRA-ARTICULAR; INTRALESIONAL; INTRAMUSCULAR; INTRAVENOUS; SOFT TISSUE PRN
Status: DISCONTINUED | OUTPATIENT
Start: 2020-08-07 | End: 2020-08-07 | Stop reason: SURG

## 2020-08-07 RX ORDER — HYDROMORPHONE HYDROCHLORIDE 1 MG/ML
0.2 INJECTION, SOLUTION INTRAMUSCULAR; INTRAVENOUS; SUBCUTANEOUS
Status: DISCONTINUED | OUTPATIENT
Start: 2020-08-07 | End: 2020-08-07 | Stop reason: HOSPADM

## 2020-08-07 RX ORDER — HYDRALAZINE HYDROCHLORIDE 20 MG/ML
5 INJECTION INTRAMUSCULAR; INTRAVENOUS
Status: DISCONTINUED | OUTPATIENT
Start: 2020-08-07 | End: 2020-08-07 | Stop reason: HOSPADM

## 2020-08-07 RX ORDER — PHENYLEPHRINE HCL IN 0.9% NACL 0.5 MG/5ML
SYRINGE (ML) INTRAVENOUS PRN
Status: DISCONTINUED | OUTPATIENT
Start: 2020-08-07 | End: 2020-08-07 | Stop reason: SURG

## 2020-08-07 RX ORDER — ONDANSETRON 2 MG/ML
4 INJECTION INTRAMUSCULAR; INTRAVENOUS
Status: DISCONTINUED | OUTPATIENT
Start: 2020-08-07 | End: 2020-08-07 | Stop reason: HOSPADM

## 2020-08-07 RX ORDER — CEFAZOLIN SODIUM 1 G/3ML
INJECTION, POWDER, FOR SOLUTION INTRAMUSCULAR; INTRAVENOUS PRN
Status: DISCONTINUED | OUTPATIENT
Start: 2020-08-07 | End: 2020-08-07 | Stop reason: SURG

## 2020-08-07 RX ORDER — LIDOCAINE AND PRILOCAINE 25; 25 MG/G; MG/G
CREAM TOPICAL ONCE
Status: COMPLETED | OUTPATIENT
Start: 2020-08-07 | End: 2020-08-07

## 2020-08-07 RX ORDER — METOPROLOL TARTRATE 1 MG/ML
1 INJECTION, SOLUTION INTRAVENOUS
Status: DISCONTINUED | OUTPATIENT
Start: 2020-08-07 | End: 2020-08-07 | Stop reason: HOSPADM

## 2020-08-07 RX ORDER — HYDROMORPHONE HYDROCHLORIDE 1 MG/ML
0.1 INJECTION, SOLUTION INTRAMUSCULAR; INTRAVENOUS; SUBCUTANEOUS
Status: DISCONTINUED | OUTPATIENT
Start: 2020-08-07 | End: 2020-08-07 | Stop reason: HOSPADM

## 2020-08-07 RX ORDER — MEPERIDINE HYDROCHLORIDE 25 MG/ML
12.5 INJECTION INTRAMUSCULAR; INTRAVENOUS; SUBCUTANEOUS
Status: DISCONTINUED | OUTPATIENT
Start: 2020-08-07 | End: 2020-08-07 | Stop reason: HOSPADM

## 2020-08-07 RX ORDER — BUPIVACAINE HYDROCHLORIDE AND EPINEPHRINE 5; 5 MG/ML; UG/ML
INJECTION, SOLUTION PERINEURAL
Status: DISCONTINUED | OUTPATIENT
Start: 2020-08-07 | End: 2020-08-07 | Stop reason: HOSPADM

## 2020-08-07 RX ADMIN — FENTANYL CITRATE 25 MCG: 50 INJECTION INTRAMUSCULAR; INTRAVENOUS at 11:52

## 2020-08-07 RX ADMIN — LIDOCAINE HYDROCHLORIDE 80 MG: 20 INJECTION, SOLUTION EPIDURAL; INFILTRATION; INTRACAUDAL at 09:50

## 2020-08-07 RX ADMIN — LIDOCAINE AND PRILOCAINE 1 APPLICATION: 25; 25 CREAM TOPICAL at 07:30

## 2020-08-07 RX ADMIN — EPHEDRINE SULFATE 10 MG: 50 INJECTION, SOLUTION INTRAVENOUS at 10:00

## 2020-08-07 RX ADMIN — POVIDONE-IODINE 15 ML: 10 SOLUTION TOPICAL at 07:45

## 2020-08-07 RX ADMIN — FENTANYL CITRATE 50 MCG: 50 INJECTION, SOLUTION INTRAMUSCULAR; INTRAVENOUS at 10:00

## 2020-08-07 RX ADMIN — MIDAZOLAM HYDROCHLORIDE 2 MG: 1 INJECTION, SOLUTION INTRAMUSCULAR; INTRAVENOUS at 09:46

## 2020-08-07 RX ADMIN — ALPRAZOLAM 0.25 MG: 0.25 TABLET ORAL at 07:32

## 2020-08-07 RX ADMIN — PROPOFOL 160 MG: 10 INJECTION, EMULSION INTRAVENOUS at 09:50

## 2020-08-07 RX ADMIN — SODIUM CHLORIDE, POTASSIUM CHLORIDE, SODIUM LACTATE AND CALCIUM CHLORIDE: 600; 310; 30; 20 INJECTION, SOLUTION INTRAVENOUS at 07:56

## 2020-08-07 RX ADMIN — FENTANYL CITRATE 25 MCG: 50 INJECTION INTRAMUSCULAR; INTRAVENOUS at 11:30

## 2020-08-07 RX ADMIN — SODIUM CHLORIDE, POTASSIUM CHLORIDE, SODIUM LACTATE AND CALCIUM CHLORIDE: 600; 310; 30; 20 INJECTION, SOLUTION INTRAVENOUS at 09:45

## 2020-08-07 RX ADMIN — EPHEDRINE SULFATE 10 MG: 50 INJECTION, SOLUTION INTRAVENOUS at 10:23

## 2020-08-07 RX ADMIN — CEFAZOLIN 2 G: 330 INJECTION, POWDER, FOR SOLUTION INTRAMUSCULAR; INTRAVENOUS at 09:50

## 2020-08-07 RX ADMIN — DEXAMETHASONE SODIUM PHOSPHATE 8 MG: 4 INJECTION, SOLUTION INTRA-ARTICULAR; INTRALESIONAL; INTRAMUSCULAR; INTRAVENOUS; SOFT TISSUE at 09:50

## 2020-08-07 RX ADMIN — Medication 100 MCG: at 09:53

## 2020-08-07 RX ADMIN — LIDOCAINE HYDROCHLORIDE 0.5 ML: 10 INJECTION, SOLUTION EPIDURAL; INFILTRATION; INTRACAUDAL at 07:45

## 2020-08-07 RX ADMIN — FENTANYL CITRATE 25 MCG: 50 INJECTION INTRAMUSCULAR; INTRAVENOUS at 11:24

## 2020-08-07 RX ADMIN — OXYCODONE HYDROCHLORIDE 5 MG: 5 SOLUTION ORAL at 11:25

## 2020-08-07 RX ADMIN — FENTANYL CITRATE 25 MCG: 50 INJECTION INTRAMUSCULAR; INTRAVENOUS at 11:45

## 2020-08-07 RX ADMIN — EPHEDRINE SULFATE 10 MG: 50 INJECTION, SOLUTION INTRAVENOUS at 09:53

## 2020-08-07 RX ADMIN — FENTANYL CITRATE 100 MCG: 50 INJECTION, SOLUTION INTRAMUSCULAR; INTRAVENOUS at 09:50

## 2020-08-07 RX ADMIN — ONDANSETRON 4 MG: 2 INJECTION INTRAMUSCULAR; INTRAVENOUS at 10:47

## 2020-08-07 RX ADMIN — OXYCODONE HYDROCHLORIDE 5 MG: 5 SOLUTION ORAL at 11:52

## 2020-08-07 RX ADMIN — Medication 100 MCG: at 09:50

## 2020-08-07 RX ADMIN — FENTANYL CITRATE 50 MCG: 50 INJECTION, SOLUTION INTRAMUSCULAR; INTRAVENOUS at 10:11

## 2020-08-07 RX ADMIN — EPHEDRINE SULFATE 10 MG: 50 INJECTION, SOLUTION INTRAVENOUS at 10:46

## 2020-08-07 RX ADMIN — KETOROLAC TROMETHAMINE 30 MG: 30 INJECTION, SOLUTION INTRAMUSCULAR at 10:12

## 2020-08-07 RX ADMIN — FENTANYL CITRATE 50 MCG: 50 INJECTION, SOLUTION INTRAMUSCULAR; INTRAVENOUS at 10:09

## 2020-08-07 ASSESSMENT — PAIN SCALES - GENERAL: PAIN_LEVEL: 2

## 2020-08-07 NOTE — OR NURSING
Pt arrived in Phase 2 at 1230, VSS, left neck and left upper chest incisions approximated with dermabond, no redness, swelling or drainage present, pt complains of 2-3 sore pain left neck area, ice pack to left neck area, right axilla band aid dressing CDI with JEMIMA drain from axilla area to bulb suction drained 35ml bloody drainage, right breast/nipple area incision approximated with dermabond, no redness, swelling or drainage present from right breast incision,  and personal belongings at bedside, d/c instructions, JEMIMA drain care, implanted port care instructions, info card and RX reviewed with pt and  they verbalized understanding of instructions, PIV removed, tip intact, discharged via w/c to home with  at 1340.

## 2020-08-07 NOTE — ANESTHESIA PROCEDURE NOTES
Airway    Date/Time: 8/7/2020 9:50 AM  Performed by: Mikal Griffith D.O.  Authorized by: Mikal Griffith D.O.     Location:  OR  Urgency:  Elective  Indications for Airway Management:  Anesthesia      Spontaneous Ventilation: absent    Sedation Level:  Deep  Preoxygenated: Yes    Final Airway Type:  Supraglottic airway  Final Supraglottic Airway:  Standard LMA    SGA Size:  4  Number of Attempts at Approach:  1

## 2020-08-07 NOTE — PROGRESS NOTES
Discharge Instructions for Lumpectomy and Lymph node removal    On the day of surgery we will be removing the lump of your breast cancer (lumpectomy) and through a separate incision under your arm we will be taking out the 2-3 lymph nodes that drain your breast (sentinel lymph node dissection).    Wound Care  1. You will have 2 incisions: 1) on your breast (lumpectomy) and 2) under your arm (lymph node removal)  2. All of your stitches are buried under the skin and dissolveable. There are no sutures to remove.   3. You can shower the day after your surgery and get your wound wet (it will be sealed by the waterproof dressings)  4. You may have some bruising after surgery. This is normal.  5. There may be a small amount of drainage from your wounds, this is usually normal and nothing to worry about. Place a dry gauze or bandage (available at any drug store) on the wound to absorb any drainage.    JEMIMA CARE:  Care for, drain, and record drain output as taught by the nurses. Keep a journal of the JEMIMA output and bring to the clinic for the doctor to review.      For Pain  1. Wear your bra as much as possible including to bed. The less your breast moves the less it will hurt.  2. You may take Tylenol or Advil every 4-6 hours as directed on the bottle.  3. You will be given a prescription for more severe pain. You can use it as needed.    When to call the doctor  1. If you have severe, uncontrolled pain at the biopsy site.  2. If you run at fever of 100.5?F or higher within a few days of the biopsy.  3. If you have a large amount of drainage that is soaking the bandage more than once a day.  4. If the area around your wound becomes red/inflamed.  5. Make sure you schedule and attend all follow-up visits with your doctor. You should have a follow up appointment in about a week after surgery.    If you have any additional questions, please do not hesitate to call the office and speak to either myself or the physician on  call.    Office address:  [address] [place] [address] [address]  [place] [address]        Aspen Woodruff MD  [facility]  [phone]

## 2020-08-07 NOTE — ANESTHESIA POSTPROCEDURE EVALUATION
Patient: Deisy Galo    Procedure Summary     Date: 08/07/20 Room / Location: [facility] 14 / SURGERY [facility]    Anesthesia Start: 0945 Anesthesia Stop: 1106    Procedures:       MASTECTOMY, PARTIAL (Right Breast)      BIOPSY, LYMPH NODE, SENTINEL (Right Axilla)      INSERTION, CATHETER-PORT PLACEMENT (Left Chest) Diagnosis: (CANCER OF CENTRAL PORTION OF RIGHT FEMALE BREAST)    Surgeon: Aspen Woodruff M.D. Responsible Provider: Mikal Griffith D.O.    Anesthesia Type: general ASA Status: 2          Final Anesthesia Type: general  Last vitals  BP   Blood Pressure: (!) 97/59    Temp   36.2 °C (97.2 °F)    Pulse   Pulse: 92   Resp   14    SpO2   99 %      Anesthesia Post Evaluation    Patient location during evaluation: PACU  Patient participation: complete - patient participated  Level of consciousness: awake and alert  Pain score: 2    Airway patency: patent  Anesthetic complications: no  Cardiovascular status: hemodynamically stable  Respiratory status: acceptable  Hydration status: euvolemic    PONV: none           Nurse Pain Score: 2 (NPRS)

## 2020-08-07 NOTE — OR NURSING
Isosulfan blue ordered by Dr. Woodruff.  3 mL isosulfan blue (50mg/5mL) injected into right breast by JACK Gamboa at 1000.    Isosulfan blue not available in Epic under surgical navigators, or in patient MAR.

## 2020-08-07 NOTE — ANESTHESIA TIME REPORT
Anesthesia Start and Stop Event Times     Date Time Event    8/7/2020 0922 Ready for Procedure     0945 Anesthesia Start     1106 Anesthesia Stop        Responsible Staff  08/07/20    Name Role Begin End    Mikal Griffith D.O. Anesth 0945 1106        Preop Diagnosis (Free Text):  Pre-op Diagnosis     CANCER OF CENTRAL PORTION OF RIGHT FEMALE BREAST        Preop Diagnosis (Codes):    Post op Diagnosis  Breast cancer (HCC)      Premium Reason  Non-Premium    Comments:

## 2020-08-07 NOTE — ANESTHESIA PREPROCEDURE EVALUATION
Breast CA  Relevant Problems   No relevant active problems       Physical Exam    Airway   Mallampati: II  TM distance: >3 FB  Neck ROM: full       Cardiovascular - normal exam  Rhythm: regular  Rate: normal  (-) murmur     Dental - normal exam           Pulmonary - normal exam  Breath sounds clear to auscultation     Abdominal    Neurological - normal exam                 Anesthesia Plan    ASA 2       Plan - general       Airway plan will be LMA        Induction: intravenous    Postoperative Plan: Postoperative administration of opioids is intended.    Pertinent diagnostic labs and testing reviewed    Informed Consent:    Anesthetic plan and risks discussed with patient.    Use of blood products discussed with: patient whom consented to blood products.

## 2020-08-07 NOTE — OP REPORT
Operative Report    Date: 8/7/2020    Surgeon: Aspen Woodruff M.D.    Assistant: Jayant CORMIER    Anesthesiologist: Nacho SNELL     Pre-operative Diagnosis:  C50.111 Malignant neoplasm of central portion of right female breast    Post-operative Diagnosis: same     Procedure: right breast partial mastectomy, right axillary sentinel lymph node biopsy, injection for identification of sentinel lymph node, port placement, right axilalry dissection    83496 Mastectomy, partial   17770 Biopsy or excision of lymph node(s); open, deep axillary node(s)  04631 Intraoperative identification (eg, mapping) of sentinel lymph node(s) includes injection of non-radioactive dye  61449 Insertion of tunneled centrally inserted central venous access device, with subcutaneous port; age 5 years or older  42973 Axillary lymphadenectomy; complete    Findings: 2 firm concerning right axillary sentinel lymph nodes, sent for frozen pathologic exam, + for malignancy.     Procedure in detail: The patient was identified in the pre-operative holding area and brought to the operating room. Prior to the procedure, she underwent GAURAV  localization with radiology due to the non-palpable nature of the lesion. As well, she underwent radionucleotide injection by nuclear medicine.    Correct side and site were identified. Pre-operative antibiotics of ancef were administered prior to the procedure. Anesthesia was smoothly induced.    I injected 5 cc of methylene blue under the areola of the right breast, and the breast was then massaged briefly. The patient was then prepped and draped in the usual sterile fashion.    I then turned my attention to the partial mastectomy. The skin was infiltrated with local anesthetic and a curvilinear incision was made above the right areola. The breast tissue was grasped with Allis clamps and a core of tissue was removed around palpable mass. The specimen was then completely removed, marked with suture for  orientation, and was sent to Radiology for mammogram. The mass and biopsy clip were within the excised specimen.    Additional tissue from the  Inferior margin was excised, marked for new margin, and sent for permanent pathology. Meticulous hemostasis was achieved with electrocautery. The area was irrigated and suctioned. The skin was closed in two layers with vicryl and monocryl.     I proceeded with the sentinel lymph node biopsy portion of the procedure. A transverse incision was made in the lower end of the axilla after anesthetizing the skin. The incision was carried deeper into the axillary tissue and a blue-stained lymphatic was identified and traced distally to a  lymph node, which was partially blue stained and highly radioactive. This was dissected free from its surrounding tissue.     There was a second blue-stained node found deep  to that, which also was radioactive. These were both firm and concerning for metastasis.This was sent to pathology for immediate evaluation which revealed 2+ lymph nodes.     We then proceeded with axillary dissection. The lateral border of the pectoralis major was identified. This was elevated, and the clavipectoral fascia was opened with cautery. We identified the axillary vein at the superior border of the incision. The thoracodorsal bundle was identified and protected. The fat lateral to the thoracodorsal bundle and inferior to the axillary vein was dissected. The axillary fat pad was raised medially and dissected off the skin until the lateral border of the latissimus dorsi was encountered. The dissection was carried lateral to medial, protecting the thoracodorsal bundle. The superficial brances of the axillary vein were divided. The long thoracic nerve was identified and protected. The pectoralis minor muscle was elevated and the fat pad at the axillary apex was carefully dissected. Dissection was carried along the chest wall and the fat pad was detached and excised from  the body. The surgical field was irrigated and a drain was placed. The skin was closed in two layers with vicryl and monocryl.     We then turned our attention to the port placement.    I began by accessing the left internal jugular vein under ultrasonographic guidance/using landmarks. I accessed the vein in one attempt. I then threaded a wire into the vein and verified its placement with xray. Then using modified Seldinger technique, I threaded the catheter over the wire, and noted good position at the cavo-atrial junction at 22 cm at the skin.    I then infiltrated local anesthetic over the left chest at the area to place the port. I also infiltrated local anesthetic over the area I was to tunnel the catheter subcutaneously. I made a skin incision and made a subcutaneous pocket to accommodate the port. I then tunneled the catheter subcutaneously toward the subcutaneous pocket, and then attached the port to the catheter. It flushed and lucho well. I sutured the port to the chest wall with prolene and closed the subcutaneous pocket in two layers. It was flushed with heparin.     The patient was awakened from anesthetic, and was taken to the recovery room in stable condition.    Sponge and needle counts were correct at the end of the case.       Specimen:   1. Right partial mastectomy  2. Additional tissue, inferior margin  3. Right axillary sentinel lymph node tissue  4. Right axillary contents    EBL: minimal    Dispo: stable, extubated, to PACU    Aspen Woodruff M.D.  [facility]  [phone]

## 2020-08-07 NOTE — DISCHARGE INSTRUCTIONS
ACTIVITY: Rest and take it easy for the first 24 hours.  A responsible adult is recommended to remain with you during that time.  It is normal to feel sleepy.  We encourage you to not do anything that requires balance, judgment or coordination.    MILD FLU-LIKE SYMPTOMS ARE NORMAL. YOU MAY EXPERIENCE GENERALIZED MUSCLE ACHES, THROAT IRRITATION, HEADACHE AND/OR SOME NAUSEA.    FOR 24 HOURS DO NOT:  Drive, operate machinery or run household appliances.  Drink beer or alcoholic beverages.   Make important decisions or sign legal documents.    SPECIAL INSTRUCTIONS:   Discharge Instructions for Lumpectomy and Lymph node removal     On the day of surgery we will be removing the lump of your breast cancer (lumpectomy) and through a separate incision under your arm we will be taking out the 2-3 lymph nodes that drain your breast (sentinel lymph node dissection).     Wound Care  1. You will have 2 incisions: 1) on your breast (lumpectomy) and 2) under your arm (lymph node removal)  2. All of your stitches are buried under the skin and dissolveable. There are no sutures to remove.   3. You can shower the day after your surgery and get your wound wet (it will be sealed by the waterproof dressings)  4. You may have some bruising after surgery. This is normal.  5. There may be a small amount of drainage from your wounds, this is usually normal and nothing to worry about. Place a dry gauze or bandage (available at any drug store) on the wound to absorb any drainage.     JEMIMA CARE:  Care for, drain, and record drain output as taught by the nurses. Keep a journal of the JEMIMA output and bring to the clinic for the doctor to review.        For Pain  1. Wear your bra as much as possible including to bed. The less your breast moves the less it will hurt.  2. You may take Tylenol or Advil every 4-6 hours as directed on the bottle.  3. You will be given a prescription for more severe pain. You can use it as needed.     When to call the  doctor  1. If you have severe, uncontrolled pain at the biopsy site.  2. If you run at fever of 100.5?F or higher within a few days of the biopsy.  3. If you have a large amount of drainage that is soaking the bandage more than once a day.  4. If the area around your wound becomes red/inflamed.  5. Make sure you schedule and attend all follow-up visits with your doctor. You should have a follow up appointment in about a week after surgery.     If you have any additional questions, please do not hesitate to call the office and speak to either myself or the physician on call.     Office address:  [address] [place] [address] [address]  [place] [address]           Aspen Woodruff MD  [facility]  [phone]            DIET: To avoid nausea, slowly advance diet as tolerated, avoiding spicy or greasy foods for the first day.  Add more substantial food to your diet according to your physician's instructions.  Babies can be fed formula or breast milk as soon as they are hungry.  INCREASE FLUIDS AND FIBER TO AVOID CONSTIPATION.          Surgical Drain Home Care  Surgical drains are used to remove extra fluid that normally builds up in a surgical wound after surgery. A surgical drain helps to heal a surgical wound. Different kinds of surgical drains include:  · Active drains. These drains use suction to pull drainage away from the surgical wound. Drainage flows through a tube to a container outside of the body. With these drains, you need to keep the bulb or the drainage container flat (compressed) at all times, except while you empty it. Flattening the bulb or container creates suction.  · Passive drains. These drains allow fluid to drain naturally, by gravity. Drainage flows through a tube to a bandage (dressing) or a container outside of the body. Passive drains do not need to be emptied.  A drain is placed during surgery. Right after surgery, drainage is usually bright red and a little thicker than water. The drainage may  gradually turn yellow or pink and become thinner. It is likely that your health care provider will remove the drain when the drainage stops or when the amount decreases to 1-2 Tbsp (15-30 mL) during a 24-hour period.  Supplies needed:  · Tape.  · Germ-free cleaning solution (sterile saline).  · Cotton swabs.  · Split gauze drain sponge: 4 x 4 inches (10 x 10 cm).  · Gauze square: 4 x 4 inches (10 x 10 cm).  How to care for your surgical drain  Care for your drain as told by your health care provider. This is important to help prevent infection. If your drain is placed at your back, or any other hard-to-reach area, ask another person to assist you in performing the following tasks:  General care  · Keep the skin around the drain dry and covered with a dressing at all times.  · Check your drain area every day for signs of infection. Check for:  ? Redness, swelling, or pain.  ? Pus or a bad smell.  ? Cloudy drainage.  ? Tenderness or pressure at the drain exit site.  Changing the dressing  Follow instructions from your health care provider about how to change your dressing. Change your dressing at least once a day. Change it more often if needed to keep the dressing dry. Make sure you:  1. Gather your supplies.  2. Wash your hands with soap and water before you change your dressing. If soap and water are not available, use hand .  3. Remove the old dressing. Avoid using scissors to do that.  4. Wash your hands with soap and water again after removing the old dressing.  5. Use sterile saline to clean your skin around the drain. You may need to use a cotton swab to clean the skin.  6. Place the tube through the slit in a drain sponge. Place the drain sponge so that it covers your wound.  7. Place the gauze square or another drain sponge on top of the drain sponge that is on the wound. Make sure the tube is between those layers.  8. Tape the dressing to your skin.  9. Tape the drainage tube to your skin 1-2  inches (2.5-5 cm) below the place where the tube enters your body. Taping keeps the tube from pulling on any stitches (sutures) that you have.  10. Wash your hands with soap and water.  11. Write down the color of your drainage and how often you change your dressing.  How to empty your active drain    1. Make sure that you have a measuring cup that you can empty your drainage into.  2. Wash your hands with soap and water. If soap and water are not available, use hand .  3. Loosen any pins or clips that hold the tube in place.  4. If your health care provider tells you to strip the tube to prevent clots and tube blockages:  ? Hold the tube at the skin with one hand. Use your other hand to pinch the tubing with your thumb and first finger.  ? Gently move your fingers down the tube while squeezing very lightly. This clears any drainage, clots, or tissue from the tube.  ? You may need to do this several times each day to keep the tube clear. Do not pull on the tube.  5. Open the bulb cap or the drain plug. Do not touch the inside of the cap or the bottom of the plug.  6. Turn the device upside down and gently squeeze.  7. Empty all of the drainage into the measuring cup.  8. Compress the bulb or the container and replace the cap or the plug. To compress the bulb or the container, squeeze it firmly in the middle while you close the cap or plug the container.  9. Write down the amount of drainage that you have in each 24-hour period. If you have less than 2 Tbsp (30 mL) of drainage during 24 hours, contact your health care provider.  10. Flush the drainage down the toilet.  11. Wash your hands with soap and water.  Contact a health care provider if:  · You have redness, swelling, or pain around your drain area.  · You have pus or a bad smell coming from your drain area.  · You have a fever or chills.  · The skin around your drain is warm to the touch.  · The amount of drainage that you have is increasing instead  of decreasing.  · You have drainage that is cloudy.  · There is a sudden stop or a sudden decrease in the amount of drainage that you have.  · Your drain tube falls out.  · Your active drain does not stay compressed after you empty it.  Summary  · Surgical drains are used to remove extra fluid that normally builds up in a surgical wound after surgery.  · Different kinds of surgical drains include active drains and passive drains. Active drains use suction to pull drainage away from the surgical wound, and passive drains allow fluid to drain naturally.  · It is important to care for your drain to prevent infection. If your drain is placed at your back, or any other hard-to-reach area, ask another person to assist you.  · Contact your health care provider if you have redness, swelling, or pain around your drain area.  This information is not intended to replace advice given to you by your health care provider. Make sure you discuss any questions you have with your health care provider.  SURGICAL DRESSING/BATHING: see above    FOLLOW-UP APPOINTMENT:  A follow-up appointment should be arranged with your doctor in 1-2 weeks; call to schedule.    You should CALL YOUR PHYSICIAN if you develop:  Fever greater than 101 degrees F.  Pain not relieved by medication, or persistent nausea or vomiting.  Excessive bleeding (blood soaking through dressing) or unexpected drainage from the wound.  Extreme redness or swelling around the incision site, drainage of pus or foul smelling drainage.  Inability to urinate or empty your bladder within 8 hours.  Problems with breathing or chest pain.        Implanted Port Insertion, Care After  This sheet gives you information about how to care for yourself after your procedure. Your health care provider may also give you more specific instructions. If you have problems or questions, contact your health care provider.  What can I expect after the procedure?  After the procedure, it is common to  have:  · Discomfort at the port insertion site.  · Bruising on the skin over the port. This should improve over 3-4 days.  Follow these instructions at home:  Port care  · After your port is placed, you will get a 's information card. The card has information about your port. Keep this card with you at all times.  · Take care of the port as told by your health care provider. Ask your health care provider if you or a family member can get training for taking care of the port at home. A home health care nurse may also take care of the port.  · Make sure to remember what type of port you have.  Incision care         · Follow instructions from your health care provider about how to take care of your port insertion site. Make sure you:  ? Wash your hands with soap and water before and after you change your bandage (dressing). If soap and water are not available, use hand .  ? Change your dressing as told by your health care provider.  ? Leave stitches (sutures), skin glue, or adhesive strips in place. These skin closures may need to stay in place for 2 weeks or longer. If adhesive strip edges start to loosen and curl up, you may trim the loose edges. Do not remove adhesive strips completely unless your health care provider tells you to do that.  · Check your port insertion site every day for signs of infection. Check for:  ? Redness, swelling, or pain.  ? Fluid or blood.  ? Warmth.  ? Pus or a bad smell.  Activity  · Return to your normal activities as told by your health care provider. Ask your health care provider what activities are safe for you.  · Do not lift anything that is heavier than 10 lb (4.5 kg), or the limit that you are told, until your health care provider says that it is safe.  General instructions  · Take over-the-counter and prescription medicines only as told by your health care provider.  · Do not take baths, swim, or use a hot tub until your health care provider approves. Ask  your health care provider if you may take showers. You may only be allowed to take sponge baths.  · Do not drive for 24 hours if you were given a sedative during your procedure.  · Wear a medical alert bracelet in case of an emergency. This will tell any health care providers that you have a port.  · Keep all follow-up visits as told by your health care provider. This is important.  Contact a health care provider if:  · You cannot flush your port with saline as directed, or you cannot draw blood from the port.  · You have a fever or chills.  · You have redness, swelling, or pain around your port insertion site.  · You have fluid or blood coming from your port insertion site.  · Your port insertion site feels warm to the touch.  · You have pus or a bad smell coming from the port insertion site.  Get help right away if:  · You have chest pain or shortness of breath.  · You have bleeding from your port that you cannot control.  Summary  · Take care of the port as told by your health care provider. Keep the 's information card with you at all times.  · Change your dressing as told by your health care provider.  · Contact a health care provider if you have a fever or chills or if you have redness, swelling, or pain around your port insertion site.  · Keep all follow-up visits as told by your health care provider.  This information is not intended to replace advice given to you by your health care provider. Make sure you discuss any questions you have with your health care provider.  Document Released: 10/08/2014 Document Revised: 07/16/2019 Document Reviewed: 07/16/2019  [employer] Patient Education © 2020 [employer]          You should call 911 if you develop problems with breathing or chest pain.  If you are unable to contact your doctor or surgical center, you should go to the nearest emergency room or urgent care center.  Physician's telephone #: [phone]    If any questions arise, call your doctor.   If your doctor is not available, please feel free to call the Surgical Center at [phone].  The Center is open Monday through Friday from 7AM to 7PM.  You can also call the HEALTH HOTLINE open 24 hours/day, 7 days/week and speak to a nurse at [phone], or toll free at [phone].    A registered nurse may call you a few days after your surgery to see how you are doing after your procedure.    MEDICATIONS: Resume taking daily medication.  Take prescribed pain medication with food.  If no medication is prescribed, you may take non-aspirin pain medication if needed.  PAIN MEDICATION CAN BE VERY CONSTIPATING.  Take a stool softener or laxative such as senokot, pericolace, or milk of magnesia if needed.    Prescription given for pain medication.  Last pain medication given at Oxycodone 10mg at 11;52am.    If your physician has prescribed pain medication that includes Acetaminophen (Tylenol), do not take additional Acetaminophen (Tylenol) while taking the prescribed medication.    Depression / Suicide Risk    As you are discharged from this [facility] facility, it is important to learn how to keep safe from harming yourself.    Recognize the warning signs:  · Abrupt changes in personality, positive or negative- including increase in energy   · Giving away possessions  · Change in eating patterns- significant weight changes-  positive or negative  · Change in sleeping patterns- unable to sleep or sleeping all the time   · Unwillingness or inability to communicate  · Depression  · Unusual sadness, discouragement and loneliness  · Talk of wanting to die  · Neglect of personal appearance   · Rebelliousness- reckless behavior  · Withdrawal from people/activities they love  · Confusion- inability to concentrate     If you or a loved one observes any of these behaviors or has concerns about self-harm, here's what you can do:  · Talk about it- your feelings and reasons for harming yourself  · Remove any means  that you might use to hurt yourself (examples: pills, rope, extension cords, firearm)  · Get professional help from the community (Mental Health, Substance Abuse, psychological counseling)  · Do not be alone:Call your Safe Contact- someone whom you trust who will be there for you.  · Call your local CRISIS HOTLINE 222-5678 or 144-386-6549  · Call your local Children's Mobile Crisis Response Team [place] (135) 818-8956 or [employer]  · Call the toll free National Suicide Prevention Hotlines   · National Suicide Prevention Lifeline 707-210-JCYC (5937)  · National Hope Line Network 800-SUICIDE (745-2646)                          Surgical Drain Record  Empty your surgical drain as told by your health care provider. Use this form to write down the amount of fluid that has collected in the drainage container. Bring this form with you to your follow-up visits.  Surgical drain #1 location: ___________________    Date __________ Time __________ Amount __________  Date __________ Time __________ Amount __________  Date __________ Time __________ Amount __________  Date __________ Time __________ Amount __________  Date __________ Time __________ Amount __________  Date __________ Time __________ Amount __________  Date __________ Time __________ Amount __________  Date __________ Time __________ Amount __________  Date __________ Time __________ Amount __________  Date __________ Time __________ Amount __________  Date __________ Time __________ Amount __________  Date __________ Time __________ Amount __________  Date __________ Time __________ Amount __________  Date __________ Time __________ Amount __________  Date __________ Time __________ Amount __________  Date __________ Time __________ Amount __________  Date __________ Time __________ Amount __________  Date __________ Time __________ Amount __________  Date __________ Time __________ Amount __________  Date __________ Time __________ Amount  __________  Date __________ Time __________ Amount __________  Surgical drain #2 location: ___________________  Date __________ Time __________ Amount __________  Date __________ Time __________ Amount __________  Date __________ Time __________ Amount __________  Date __________ Time __________ Amount __________  Date __________ Time __________ Amount __________  Date __________ Time __________ Amount __________  Date __________ Time __________ Amount __________  Date __________ Time __________ Amount __________  Date __________ Time __________ Amount __________  Date __________ Time __________ Amount __________  Date __________ Time __________ Amount __________  Date __________ Time __________ Amount __________  Date __________ Time __________ Amount __________  Date __________ Time __________ Amount __________  Date __________ Time __________ Amount __________  Date __________ Time __________ Amount __________  Date __________ Time __________ Amount __________  Date __________ Time __________ Amount __________  Date __________ Time __________ Amount __________  Date __________ Time __________ Amount __________  Date __________ Time __________ Amount __________  This information is not intended to replace advice given to you by your health care provider. Make sure you discuss any questions you have with your health care provider.  Document Released: 09/24/2018 Document Revised: 09/24/2018 Document Reviewed: 09/24/2018  [employer]vier Patient Education © 2020 [employer]

## 2020-08-14 ENCOUNTER — ANESTHESIA EVENT (OUTPATIENT)
Dept: SURGERY | Facility: MEDICAL CENTER | Age: 45
End: 2020-08-14
Payer: MEDICAID

## 2020-08-14 ENCOUNTER — HOSPITAL ENCOUNTER (OUTPATIENT)
Facility: MEDICAL CENTER | Age: 45
End: 2020-08-14
Attending: SURGERY | Admitting: SURGERY
Payer: MEDICAID

## 2020-08-14 ENCOUNTER — ANESTHESIA (OUTPATIENT)
Dept: SURGERY | Facility: MEDICAL CENTER | Age: 45
End: 2020-08-14
Payer: MEDICAID

## 2020-08-14 VITALS
RESPIRATION RATE: 18 BRPM | SYSTOLIC BLOOD PRESSURE: 109 MMHG | BODY MASS INDEX: 28.25 KG/M2 | WEIGHT: 169.53 LBS | HEIGHT: 65 IN | HEART RATE: 79 BPM | TEMPERATURE: 98.2 F | DIASTOLIC BLOOD PRESSURE: 56 MMHG | OXYGEN SATURATION: 99 %

## 2020-08-14 DIAGNOSIS — G89.18 POST-OPERATIVE PAIN: ICD-10-CM

## 2020-08-14 LAB
COVID ORDER STATUS COVID19: NORMAL
HCG UR QL: NEGATIVE
PATHOLOGY CONSULT NOTE: NORMAL
SARS-COV-2 RDRP RESP QL NAA+PROBE: NOTDETECTED
SPECIMEN SOURCE: NORMAL

## 2020-08-14 PROCEDURE — 501838 HCHG SUTURE GENERAL: Performed by: SURGERY

## 2020-08-14 PROCEDURE — U0004 COV-19 TEST NON-CDC HGH THRU: HCPCS

## 2020-08-14 PROCEDURE — C9803 HOPD COVID-19 SPEC COLLECT: HCPCS | Performed by: SURGERY

## 2020-08-14 PROCEDURE — A9270 NON-COVERED ITEM OR SERVICE: HCPCS | Performed by: SURGERY

## 2020-08-14 PROCEDURE — 700102 HCHG RX REV CODE 250 W/ 637 OVERRIDE(OP): Performed by: SURGERY

## 2020-08-14 PROCEDURE — 88307 TISSUE EXAM BY PATHOLOGIST: CPT | Mod: 59

## 2020-08-14 PROCEDURE — 160036 HCHG PACU - EA ADDL 30 MINS PHASE I: Performed by: SURGERY

## 2020-08-14 PROCEDURE — 700101 HCHG RX REV CODE 250: Performed by: ANESTHESIOLOGY

## 2020-08-14 PROCEDURE — 700102 HCHG RX REV CODE 250 W/ 637 OVERRIDE(OP): Performed by: ANESTHESIOLOGY

## 2020-08-14 PROCEDURE — 160002 HCHG RECOVERY MINUTES (STAT): Performed by: SURGERY

## 2020-08-14 PROCEDURE — A9270 NON-COVERED ITEM OR SERVICE: HCPCS | Performed by: ANESTHESIOLOGY

## 2020-08-14 PROCEDURE — 160046 HCHG PACU - 1ST 60 MINS PHASE II: Performed by: SURGERY

## 2020-08-14 PROCEDURE — 160039 HCHG SURGERY MINUTES - EA ADDL 1 MIN LEVEL 3: Performed by: SURGERY

## 2020-08-14 PROCEDURE — 81025 URINE PREGNANCY TEST: CPT

## 2020-08-14 PROCEDURE — 700111 HCHG RX REV CODE 636 W/ 250 OVERRIDE (IP): Performed by: ANESTHESIOLOGY

## 2020-08-14 PROCEDURE — 700105 HCHG RX REV CODE 258: Performed by: SURGERY

## 2020-08-14 PROCEDURE — 160025 RECOVERY II MINUTES (STATS): Performed by: SURGERY

## 2020-08-14 PROCEDURE — 160028 HCHG SURGERY MINUTES - 1ST 30 MINS LEVEL 3: Performed by: SURGERY

## 2020-08-14 PROCEDURE — 160009 HCHG ANES TIME/MIN: Performed by: SURGERY

## 2020-08-14 PROCEDURE — 160035 HCHG PACU - 1ST 60 MINS PHASE I: Performed by: SURGERY

## 2020-08-14 PROCEDURE — 700101 HCHG RX REV CODE 250: Performed by: SURGERY

## 2020-08-14 PROCEDURE — 160048 HCHG OR STATISTICAL LEVEL 1-5: Performed by: SURGERY

## 2020-08-14 RX ORDER — BUPIVACAINE HYDROCHLORIDE AND EPINEPHRINE 5; 5 MG/ML; UG/ML
INJECTION, SOLUTION PERINEURAL
Status: DISCONTINUED | OUTPATIENT
Start: 2020-08-14 | End: 2020-08-14 | Stop reason: HOSPADM

## 2020-08-14 RX ORDER — SODIUM CHLORIDE, SODIUM LACTATE, POTASSIUM CHLORIDE, CALCIUM CHLORIDE 600; 310; 30; 20 MG/100ML; MG/100ML; MG/100ML; MG/100ML
INJECTION, SOLUTION INTRAVENOUS CONTINUOUS
Status: DISCONTINUED | OUTPATIENT
Start: 2020-08-14 | End: 2020-08-14 | Stop reason: HOSPADM

## 2020-08-14 RX ORDER — ONDANSETRON 2 MG/ML
4 INJECTION INTRAMUSCULAR; INTRAVENOUS
Status: DISCONTINUED | OUTPATIENT
Start: 2020-08-14 | End: 2020-08-14 | Stop reason: HOSPADM

## 2020-08-14 RX ORDER — ACETAMINOPHEN 500 MG
1000 TABLET ORAL ONCE
Status: COMPLETED | OUTPATIENT
Start: 2020-08-14 | End: 2020-08-14

## 2020-08-14 RX ORDER — METOCLOPRAMIDE HYDROCHLORIDE 5 MG/ML
INJECTION INTRAMUSCULAR; INTRAVENOUS PRN
Status: DISCONTINUED | OUTPATIENT
Start: 2020-08-14 | End: 2020-08-14 | Stop reason: SURG

## 2020-08-14 RX ORDER — LIDOCAINE HYDROCHLORIDE 20 MG/ML
INJECTION, SOLUTION EPIDURAL; INFILTRATION; INTRACAUDAL; PERINEURAL PRN
Status: DISCONTINUED | OUTPATIENT
Start: 2020-08-14 | End: 2020-08-14 | Stop reason: SURG

## 2020-08-14 RX ORDER — OXYCODONE HCL 5 MG/5 ML
5 SOLUTION, ORAL ORAL
Status: COMPLETED | OUTPATIENT
Start: 2020-08-14 | End: 2020-08-14

## 2020-08-14 RX ORDER — CEFAZOLIN SODIUM 1 G/3ML
INJECTION, POWDER, FOR SOLUTION INTRAMUSCULAR; INTRAVENOUS PRN
Status: DISCONTINUED | OUTPATIENT
Start: 2020-08-14 | End: 2020-08-14 | Stop reason: SURG

## 2020-08-14 RX ORDER — CELECOXIB 200 MG/1
400 CAPSULE ORAL ONCE
Status: COMPLETED | OUTPATIENT
Start: 2020-08-14 | End: 2020-08-14

## 2020-08-14 RX ORDER — HALOPERIDOL 5 MG/ML
1 INJECTION INTRAMUSCULAR
Status: DISCONTINUED | OUTPATIENT
Start: 2020-08-14 | End: 2020-08-14 | Stop reason: HOSPADM

## 2020-08-14 RX ORDER — DEXAMETHASONE SODIUM PHOSPHATE 4 MG/ML
INJECTION, SOLUTION INTRA-ARTICULAR; INTRALESIONAL; INTRAMUSCULAR; INTRAVENOUS; SOFT TISSUE PRN
Status: DISCONTINUED | OUTPATIENT
Start: 2020-08-14 | End: 2020-08-14 | Stop reason: SURG

## 2020-08-14 RX ORDER — OXYCODONE HCL 5 MG/5 ML
10 SOLUTION, ORAL ORAL
Status: COMPLETED | OUTPATIENT
Start: 2020-08-14 | End: 2020-08-14

## 2020-08-14 RX ORDER — MIDAZOLAM HYDROCHLORIDE 1 MG/ML
INJECTION INTRAMUSCULAR; INTRAVENOUS PRN
Status: DISCONTINUED | OUTPATIENT
Start: 2020-08-14 | End: 2020-08-14 | Stop reason: SURG

## 2020-08-14 RX ORDER — MEPERIDINE HYDROCHLORIDE 25 MG/ML
12.5 INJECTION INTRAMUSCULAR; INTRAVENOUS; SUBCUTANEOUS
Status: DISCONTINUED | OUTPATIENT
Start: 2020-08-14 | End: 2020-08-14 | Stop reason: HOSPADM

## 2020-08-14 RX ORDER — GABAPENTIN 300 MG/1
300 CAPSULE ORAL ONCE
Status: COMPLETED | OUTPATIENT
Start: 2020-08-14 | End: 2020-08-14

## 2020-08-14 RX ORDER — ONDANSETRON 2 MG/ML
INJECTION INTRAMUSCULAR; INTRAVENOUS PRN
Status: DISCONTINUED | OUTPATIENT
Start: 2020-08-14 | End: 2020-08-14 | Stop reason: SURG

## 2020-08-14 RX ORDER — HYDROCODONE BITARTRATE AND ACETAMINOPHEN 5; 325 MG/1; MG/1
1-2 TABLET ORAL EVERY 4 HOURS PRN
Qty: 30 TAB | Refills: 0 | Status: ON HOLD | OUTPATIENT
Start: 2020-08-14 | End: 2020-08-20

## 2020-08-14 RX ADMIN — ACETAMINOPHEN 1000 MG: 500 TABLET ORAL at 06:04

## 2020-08-14 RX ADMIN — GABAPENTIN 300 MG: 300 CAPSULE ORAL at 06:04

## 2020-08-14 RX ADMIN — FENTANYL CITRATE 25 MCG: 50 INJECTION INTRAMUSCULAR; INTRAVENOUS at 07:22

## 2020-08-14 RX ADMIN — LIDOCAINE HYDROCHLORIDE 0.5 ML: 10 INJECTION, SOLUTION EPIDURAL; INFILTRATION; INTRACAUDAL at 06:04

## 2020-08-14 RX ADMIN — CEFAZOLIN 2 G: 330 INJECTION, POWDER, FOR SOLUTION INTRAMUSCULAR; INTRAVENOUS at 07:09

## 2020-08-14 RX ADMIN — POVIDONE-IODINE 15 ML: 10 SOLUTION TOPICAL at 06:04

## 2020-08-14 RX ADMIN — MIDAZOLAM HYDROCHLORIDE 2 MG: 1 INJECTION, SOLUTION INTRAMUSCULAR; INTRAVENOUS at 07:03

## 2020-08-14 RX ADMIN — FENTANYL CITRATE 50 MCG: 50 INJECTION INTRAMUSCULAR; INTRAVENOUS at 07:09

## 2020-08-14 RX ADMIN — ONDANSETRON 4 MG: 2 INJECTION INTRAMUSCULAR; INTRAVENOUS at 07:24

## 2020-08-14 RX ADMIN — LIDOCAINE HYDROCHLORIDE 4 ML: 20 INJECTION, SOLUTION EPIDURAL; INFILTRATION; INTRACAUDAL at 07:09

## 2020-08-14 RX ADMIN — FENTANYL CITRATE 25 MCG: 50 INJECTION INTRAMUSCULAR; INTRAVENOUS at 07:25

## 2020-08-14 RX ADMIN — SODIUM CHLORIDE, POTASSIUM CHLORIDE, SODIUM LACTATE AND CALCIUM CHLORIDE: 600; 310; 30; 20 INJECTION, SOLUTION INTRAVENOUS at 06:04

## 2020-08-14 RX ADMIN — METOCLOPRAMIDE 10 MG: 5 INJECTION, SOLUTION INTRAMUSCULAR; INTRAVENOUS at 07:09

## 2020-08-14 RX ADMIN — DEXAMETHASONE SODIUM PHOSPHATE 8 MG: 4 INJECTION, SOLUTION INTRA-ARTICULAR; INTRALESIONAL; INTRAMUSCULAR; INTRAVENOUS; SOFT TISSUE at 07:09

## 2020-08-14 RX ADMIN — CELECOXIB 400 MG: 200 CAPSULE ORAL at 06:04

## 2020-08-14 RX ADMIN — PROPOFOL 160 MG: 10 INJECTION, EMULSION INTRAVENOUS at 07:09

## 2020-08-14 RX ADMIN — OXYCODONE HYDROCHLORIDE 5 MG: 5 SOLUTION ORAL at 08:05

## 2020-08-14 ASSESSMENT — PAIN SCALES - GENERAL: PAIN_LEVEL: 1

## 2020-08-14 NOTE — ANESTHESIA PROCEDURE NOTES
Airway    Date/Time: 8/14/2020 7:10 AM  Performed by: Brenda Schaffer M.D.  Authorized by: Brenda Schaffer M.D.     Location:  OR  Urgency:  Elective  Indications for Airway Management:  Anesthesia      Spontaneous Ventilation: absent    Sedation Level:  Deep  Preoxygenated: Yes    Mask Difficulty Assessment:  0 - not attempted  Final Airway Type:  Supraglottic airway  Final Supraglottic Airway:  Standard LMA    SGA Size:  4  Number of Attempts at Approach:  1

## 2020-08-14 NOTE — ANESTHESIA PREPROCEDURE EVALUATION
43 yo w/hx right breast cancer    Relevant Problems   ANESTHESIA (within normal limits)       Physical Exam    Airway   Mallampati: II  TM distance: >3 FB  Neck ROM: full       Cardiovascular   Rhythm: regular  Rate: normal  (-) murmur     Dental - normal exam           Pulmonary   Breath sounds clear to auscultation     Abdominal    Neurological - normal exam                 Anesthesia Plan    ASA 2       Plan - general       Airway plan will be LMA        Induction: intravenous    Postoperative Plan: Postoperative administration of opioids is intended.    Pertinent diagnostic labs and testing reviewed    Informed Consent:    Anesthetic plan and risks discussed with patient.

## 2020-08-14 NOTE — OP REPORT
Operative Report    Date: 8/14/2020    Surgeon: Aspen Woodruff M.D.    Assistant: JACK Gamboa    Anesthesiologist: Sarbjit SNELL    Pre-operative Diagnosis:  C50.111 Malignant neoplasm of central portion of right female breast    Post-operative Diagnosis: same     Procedure: re-excision right partial mastectomy    Procedure in detail: The patient was identified in the pre-operative holding area and brought to the operating room. Correct side and site were identified. Pre-operative antibiotics of ancef were administered prior to the procedure. Anesthesia was smoothly induced.The patient was then prepped and draped in the usual sterile fashion.    The skin was infiltrated with local anesthetic and the prior incision reopened and the seroma evacuated. THe cavity was opened and the new margin area identified. Additional tissue was taken from the deep and superior margins, marked for orientation, and sent for permanent pathologic exam.  Meticulous hemostasis was achieved with electrocautery. The area was irrigated and suctioned. The skin was closed in two layers with vicryl and monocryl. Sterile dressings were applied.     The patient was awakened from anesthetic, and was taken to the recovery room in stable condition.    Sponge and needle counts were correct at the end of the case.     Specimen:   1. additional tissue, superior margin  2. Additional tissue, deep margin    EBL: minimal    Dispo: stable, extubated, to PACU    Aspen Woodruff M.D.  [facility]  [phone]

## 2020-08-14 NOTE — ANESTHESIA TIME REPORT
Anesthesia Start and Stop Event Times     Date Time Event    8/14/2020 0638 Ready for Procedure     0703 Anesthesia Start     0742 Anesthesia Stop        Responsible Staff  08/14/20    Name Role Begin End    Brenda Schaffer M.D. Anesth 0703 0742        Preop Diagnosis (Free Text):  Pre-op Diagnosis     CANCER OF CENTRAL PORTION OF RIGHT FEMALE BREAST        Preop Diagnosis (Codes):    Post op Diagnosis  History of right breast cancer      Premium Reason  Non-Premium    Comments:

## 2020-08-14 NOTE — PROGRESS NOTES
Discharge Instructions, Lumpectomy:    Care of Your Incisions:  • You can shower with the dressing on as it is waterproof.  Avoid getting lotions, powders or deodorant on the incision while it is healing.  • There is no need to re-bandage the incisions after the first 48 hours, but it may be more comfortable to tuck a gauze pad inside your bra for the first week. You can buy 4 x 4 gauze dressings at your pharmacy.   • You may have thin paper strips across the incision. These are called Steri-Strips. When they start to curl at the edges, you can peel them off. It is okay to shower with these on.  • Don’t worry if the area under either incision feels firm or hard. This is normal and usually softens within a few months.    How to Manage Discomfort After Surgery:  • It is normal to have tenderness, discomfort or mild swelling at the site of the incisions.     You may also feel:  - Numbness at the incisions.  - Occasional shooting pains in the breast as you heal.    • You will be given a prescription for pain medication prior to being discharged from the hospital. If you are having pain, take the medication as directed by your physician and by the label directions. You should be comfortable and moving around. Most women use some prescription pain medication, though some need only over the counter pain medication, such as ibuprofen (Motrin) or acetaminophen (Tylenol). Some women have little pain and take nothing at all. Whatever amount of pain you have, it is important to listen to your body and use the medication if needed during your recovery.    • Prescription pain medication may cause constipation. If you are having problems, use what you normally would or call your nurse for suggestions. It also helps to stay regular by including fiber in your diet (for example: bran or fruits and vegetables) and drink plenty of liquids (water, juice, etc.).    Activity:  • You may resume your normal routine, but avoid heavy lifting  (over 10 pounds), pushing or pulling until your first post-operative visit, unless otherwise specified by your surgeon.  • Do not drive for at least 24-48 hours after surgery (unless otherwise directed by your surgeon), or while you are taking prescription pain medicine. Prescription pain medicine causes drowsiness (makes you sleepy) so you should avoid doing any tasks where you should be awake and alert (driving, operating machinery, etc).    When to Call Your Doctor/Nurse:  • If you have a fever greater than 100.5, increased pain, redness or warmth at the area around the incision, drainage from the incision or around the drain, or swelling of the arm. Call Dr. Woodruff's office at [phone] with any other questions or concerns.    You should have an appointment to see Dr. Woodruff about a week after surgery, call [phone] if you do not already have an appointment.    Office address:  [address] [place] [address] [address]  [place] [address]      Aspen Woodruff M.D.  [facility]  [phone]

## 2020-08-14 NOTE — DISCHARGE INSTRUCTIONS
ACTIVITY: Rest and take it easy for the first 24 hours.  A responsible adult is recommended to remain with you during that time.  It is normal to feel sleepy.  We encourage you to not do anything that requires balance, judgment or coordination.    MILD FLU-LIKE SYMPTOMS ARE NORMAL. YOU MAY EXPERIENCE GENERALIZED MUSCLE ACHES, THROAT IRRITATION, HEADACHE AND/OR SOME NAUSEA.    FOR 24 HOURS DO NOT:  Drive, operate machinery or run household appliances.  Drink beer or alcoholic beverages.   Make important decisions or sign legal documents.    SPECIAL INSTRUCTIONS:   Discharge Instructions, Lumpectomy:     Care of Your Incisions:  • You can shower with the dressing on as it is waterproof.  Avoid getting lotions, powders or deodorant on the incision while it is healing.  • There is no need to re-bandage the incisions after the first 48 hours, but it may be more comfortable to tuck a gauze pad inside your bra for the first week. You can buy 4 x 4 gauze dressings at your pharmacy.   • You may have thin paper strips across the incision. These are called Steri-Strips. When they start to curl at the edges, you can peel them off. It is okay to shower with these on.  • Don’t worry if the area under either incision feels firm or hard. This is normal and usually softens within a few months.     How to Manage Discomfort After Surgery:  • It is normal to have tenderness, discomfort or mild swelling at the site of the incisions.      You may also feel:  - Numbness at the incisions.  - Occasional shooting pains in the breast as you heal.     • You will be given a prescription for pain medication prior to being discharged from the hospital. If you are having pain, take the medication as directed by your physician and by the label directions. You should be comfortable and moving around. Most women use some prescription pain medication, though some need only over the counter pain medication, such as ibuprofen (Motrin) or acetaminophen  (Tylenol). Some women have little pain and take nothing at all. Whatever amount of pain you have, it is important to listen to your body and use the medication if needed during your recovery.     • Prescription pain medication may cause constipation. If you are having problems, use what you normally would or call your nurse for suggestions. It also helps to stay regular by including fiber in your diet (for example: bran or fruits and vegetables) and drink plenty of liquids (water, juice, etc.).     Activity:  • You may resume your normal routine, but avoid heavy lifting (over 10 pounds), pushing or pulling until your first post-operative visit, unless otherwise specified by your surgeon.  • Do not drive for at least 24-48 hours after surgery (unless otherwise directed by your surgeon), or while you are taking prescription pain medicine. Prescription pain medicine causes drowsiness (makes you sleepy) so you should avoid doing any tasks where you should be awake and alert (driving, operating machinery, etc).     When to Call Your Doctor/Nurse:  • If you have a fever greater than 100.5, increased pain, redness or warmth at the area around the incision, drainage from the incision or around the drain, or swelling of the arm. Call Dr. Woodruff's office at [phone] with any other questions or concerns.     You should have an appointment to see Dr. Woodruff about a week after surgery, call [phone] if you do not already have an appointment.     Office address:  [address] [place] [address] [address]  [place] [address]      Aspen Woodruff M.D.  [facility]  [phone]    DIET: To avoid nausea, slowly advance diet as tolerated, avoiding spicy or greasy foods for the first day.  Add more substantial food to your diet according to your physician's instructions.  Babies can be fed formula or breast milk as soon as they are hungry.  INCREASE FLUIDS AND FIBER TO AVOID CONSTIPATION.    FOLLOW-UP APPOINTMENT:  A follow-up  appointment should be arranged with your doctor; call to schedule.    You should CALL YOUR PHYSICIAN if you develop:  Fever greater than 101 degrees F.  Pain not relieved by medication, or persistent nausea or vomiting.  Excessive bleeding (blood soaking through dressing) or unexpected drainage from the wound.  Extreme redness or swelling around the incision site, drainage of pus or foul smelling drainage.  Inability to urinate or empty your bladder within 8 hours.  Problems with breathing or chest pain.    You should call 911 if you develop problems with breathing or chest pain.  If you are unable to contact your doctor or surgical center, you should go to the nearest emergency room or urgent care center.  Physician's telephone #: *Dr. Woodruff [phone]*    If any questions arise, call your doctor.  If your doctor is not available, please feel free to call the Surgical Center at [phone].  The Center is open Monday through Friday from 7AM to 7PM.  You can also call the [facility] HOTLINE open 24 hours/day, 7 days/week and speak to a nurse at [phone], or toll free at [phone].    A registered nurse may call you a few days after your surgery to see how you are doing after your procedure.    MEDICATIONS: Resume taking daily medication.  Take prescribed pain medication with food.  If no medication is prescribed, you may take non-aspirin pain medication if needed.  PAIN MEDICATION CAN BE VERY CONSTIPATING.  Take a stool softener or laxative such as senokot, pericolace, or milk of magnesia if needed.    Prescription given for Norco.  Last pain medication given at 8:05 am.    If your physician has prescribed pain medication that includes Acetaminophen (Tylenol), do not take additional Acetaminophen (Tylenol) while taking the prescribed medication.    Depression / Suicide Risk    As you are discharged from this [facility] facility, it is important to learn how to keep safe from harming  yourself.    Recognize the warning signs:  · Abrupt changes in personality, positive or negative- including increase in energy   · Giving away possessions  · Change in eating patterns- significant weight changes-  positive or negative  · Change in sleeping patterns- unable to sleep or sleeping all the time   · Unwillingness or inability to communicate  · Depression  · Unusual sadness, discouragement and loneliness  · Talk of wanting to die  · Neglect of personal appearance   · Rebelliousness- reckless behavior  · Withdrawal from people/activities they love  · Confusion- inability to concentrate     If you or a loved one observes any of these behaviors or has concerns about self-harm, here's what you can do:  · Talk about it- your feelings and reasons for harming yourself  · Remove any means that you might use to hurt yourself (examples: pills, rope, extension cords, firearm)  · Get professional help from the community (Mental Health, Substance Abuse, psychological counseling)  · Do not be alone:Call your Safe Contact- someone whom you trust who will be there for you.  · Call your local CRISIS HOTLINE 241-5093 or 290-840-6350  · Call your local Children's Mobile Crisis Response Team [place] (117) 818-9054 or www.[place]  · Call the toll free National Suicide Prevention Hotlines   · National Suicide Prevention Lifeline 166-222-SLSY (4903)  · National Hope Line Network 800-SUICIDE (908-2534)

## 2020-08-14 NOTE — ANESTHESIA POSTPROCEDURE EVALUATION
Patient: Deisy Galo    Procedure Summary     Date: 08/14/20 Room / Location: [place] [address] / SURGERY [facility]    Anesthesia Start: 0703 Anesthesia Stop: 0742    Procedure: MASTECTOMY, PARTIAL- RE EXCISION FOR MARGINS (Right Breast) Diagnosis: (CANCER OF CENTRAL PORTION OF RIGHT FEMALE BREAST)    Surgeon: Aspen Woodruff M.D. Responsible Provider: Brenda Schaffer M.D.    Anesthesia Type: general ASA Status: 2          Final Anesthesia Type: general  Last vitals  BP   Blood Pressure: 102/50    Temp   36.2 °C (97.1 °F)    Pulse   Pulse: 78   Resp   20    SpO2   100 %      Anesthesia Post Evaluation    Patient location during evaluation: PACU  Patient participation: complete - patient participated  Level of consciousness: awake  Pain score: 1    Airway patency: patent  Anesthetic complications: no  Cardiovascular status: adequate  Respiratory status: acceptable  Hydration status: acceptable    PONV: none           Nurse Pain Score: 1 (NPRS)

## 2020-08-19 ENCOUNTER — PATIENT OUTREACH (OUTPATIENT)
Dept: HEALTH INFORMATION MANAGEMENT | Facility: OTHER | Age: 45
End: 2020-08-19

## 2020-08-19 NOTE — OR NURSING
COVID-19 Pre-surgery screenin. Do you have an undiagnosed respiratory illness or symptoms such as coughing or sneezing? No      2. Do you have an unexplained fever greater than 100.4 degrees Fahrenheit or 38 degrees Celsius?     *No    3. Have you had direct exposure to a patient who tested positive for Covid-19?    *No    4. Have you had any loss of your sense of taste or smell? Have you had N/V or sore throat? **NO*    Patient has been informed of visitor policy and asked to wear a mask upon entering the hospital   *Yes

## 2020-08-20 ENCOUNTER — ANESTHESIA (OUTPATIENT)
Dept: SURGERY | Facility: MEDICAL CENTER | Age: 45
End: 2020-08-20
Payer: MEDICAID

## 2020-08-20 ENCOUNTER — ANESTHESIA EVENT (OUTPATIENT)
Dept: SURGERY | Facility: MEDICAL CENTER | Age: 45
End: 2020-08-20
Payer: MEDICAID

## 2020-08-20 ENCOUNTER — HOSPITAL ENCOUNTER (OUTPATIENT)
Facility: MEDICAL CENTER | Age: 45
End: 2020-08-20
Attending: SURGERY | Admitting: SURGERY
Payer: MEDICAID

## 2020-08-20 VITALS
SYSTOLIC BLOOD PRESSURE: 94 MMHG | BODY MASS INDEX: 27.92 KG/M2 | RESPIRATION RATE: 20 BRPM | WEIGHT: 167.55 LBS | HEART RATE: 91 BPM | TEMPERATURE: 97.5 F | HEIGHT: 65 IN | OXYGEN SATURATION: 99 % | DIASTOLIC BLOOD PRESSURE: 53 MMHG

## 2020-08-20 DIAGNOSIS — G89.18 POSTOPERATIVE PAIN: ICD-10-CM

## 2020-08-20 LAB
HCG UR QL: NEGATIVE
PATHOLOGY CONSULT NOTE: NORMAL

## 2020-08-20 PROCEDURE — 500002 HCHG ADHESIVE, DERMABOND: Performed by: SURGERY

## 2020-08-20 PROCEDURE — 160025 RECOVERY II MINUTES (STATS): Performed by: SURGERY

## 2020-08-20 PROCEDURE — 700102 HCHG RX REV CODE 250 W/ 637 OVERRIDE(OP): Performed by: SURGERY

## 2020-08-20 PROCEDURE — 700111 HCHG RX REV CODE 636 W/ 250 OVERRIDE (IP)

## 2020-08-20 PROCEDURE — 160009 HCHG ANES TIME/MIN: Performed by: SURGERY

## 2020-08-20 PROCEDURE — 160039 HCHG SURGERY MINUTES - EA ADDL 1 MIN LEVEL 3: Performed by: SURGERY

## 2020-08-20 PROCEDURE — 160046 HCHG PACU - 1ST 60 MINS PHASE II: Performed by: SURGERY

## 2020-08-20 PROCEDURE — 81025 URINE PREGNANCY TEST: CPT

## 2020-08-20 PROCEDURE — 700105 HCHG RX REV CODE 258: Performed by: SURGERY

## 2020-08-20 PROCEDURE — 160002 HCHG RECOVERY MINUTES (STAT): Performed by: SURGERY

## 2020-08-20 PROCEDURE — 88307 TISSUE EXAM BY PATHOLOGIST: CPT

## 2020-08-20 PROCEDURE — 160028 HCHG SURGERY MINUTES - 1ST 30 MINS LEVEL 3: Performed by: SURGERY

## 2020-08-20 PROCEDURE — 700102 HCHG RX REV CODE 250 W/ 637 OVERRIDE(OP)

## 2020-08-20 PROCEDURE — 160048 HCHG OR STATISTICAL LEVEL 1-5: Performed by: SURGERY

## 2020-08-20 PROCEDURE — A9270 NON-COVERED ITEM OR SERVICE: HCPCS | Performed by: SURGERY

## 2020-08-20 PROCEDURE — 501838 HCHG SUTURE GENERAL: Performed by: SURGERY

## 2020-08-20 PROCEDURE — 160036 HCHG PACU - EA ADDL 30 MINS PHASE I: Performed by: SURGERY

## 2020-08-20 PROCEDURE — 160035 HCHG PACU - 1ST 60 MINS PHASE I: Performed by: SURGERY

## 2020-08-20 PROCEDURE — A9270 NON-COVERED ITEM OR SERVICE: HCPCS

## 2020-08-20 PROCEDURE — 700101 HCHG RX REV CODE 250: Performed by: SURGERY

## 2020-08-20 RX ORDER — MIDAZOLAM HYDROCHLORIDE 1 MG/ML
INJECTION INTRAMUSCULAR; INTRAVENOUS
Status: COMPLETED
Start: 2020-08-20 | End: 2020-08-20

## 2020-08-20 RX ORDER — BUPIVACAINE HYDROCHLORIDE AND EPINEPHRINE 5; 5 MG/ML; UG/ML
INJECTION, SOLUTION PERINEURAL
Status: DISCONTINUED | OUTPATIENT
Start: 2020-08-20 | End: 2020-08-20 | Stop reason: HOSPADM

## 2020-08-20 RX ORDER — DIPHENHYDRAMINE HYDROCHLORIDE 50 MG/ML
12.5 INJECTION INTRAMUSCULAR; INTRAVENOUS
Status: DISCONTINUED | OUTPATIENT
Start: 2020-08-20 | End: 2020-08-20 | Stop reason: HOSPADM

## 2020-08-20 RX ORDER — MIDAZOLAM HYDROCHLORIDE 1 MG/ML
2 INJECTION INTRAMUSCULAR; INTRAVENOUS
Status: DISCONTINUED | OUTPATIENT
Start: 2020-08-20 | End: 2020-08-20 | Stop reason: HOSPADM

## 2020-08-20 RX ORDER — HYDROCODONE BITARTRATE AND ACETAMINOPHEN 5; 325 MG/1; MG/1
1-2 TABLET ORAL EVERY 4 HOURS PRN
Qty: 30 TAB | Refills: 0 | Status: SHIPPED | OUTPATIENT
Start: 2020-08-20 | End: 2020-08-25

## 2020-08-20 RX ORDER — ONDANSETRON 2 MG/ML
4 INJECTION INTRAMUSCULAR; INTRAVENOUS
Status: DISCONTINUED | OUTPATIENT
Start: 2020-08-20 | End: 2020-08-20 | Stop reason: HOSPADM

## 2020-08-20 RX ORDER — KETOROLAC TROMETHAMINE 30 MG/ML
INJECTION, SOLUTION INTRAMUSCULAR; INTRAVENOUS PRN
Status: DISCONTINUED | OUTPATIENT
Start: 2020-08-20 | End: 2020-08-20 | Stop reason: SURG

## 2020-08-20 RX ORDER — ONDANSETRON 2 MG/ML
INJECTION INTRAMUSCULAR; INTRAVENOUS PRN
Status: DISCONTINUED | OUTPATIENT
Start: 2020-08-20 | End: 2020-08-20 | Stop reason: SURG

## 2020-08-20 RX ORDER — OXYCODONE HYDROCHLORIDE AND ACETAMINOPHEN 5; 325 MG/1; MG/1
2 TABLET ORAL
Status: COMPLETED | OUTPATIENT
Start: 2020-08-20 | End: 2020-08-20

## 2020-08-20 RX ORDER — ISOSULFAN BLUE 50 MG/5ML
INJECTION, SOLUTION SUBCUTANEOUS
Status: DISCONTINUED
Start: 2020-08-20 | End: 2020-08-20 | Stop reason: HOSPADM

## 2020-08-20 RX ORDER — CEFAZOLIN SODIUM 1 G/3ML
INJECTION, POWDER, FOR SOLUTION INTRAMUSCULAR; INTRAVENOUS PRN
Status: DISCONTINUED | OUTPATIENT
Start: 2020-08-20 | End: 2020-08-20 | Stop reason: SURG

## 2020-08-20 RX ORDER — BUPIVACAINE HYDROCHLORIDE AND EPINEPHRINE 5; 5 MG/ML; UG/ML
INJECTION, SOLUTION EPIDURAL; INTRACAUDAL; PERINEURAL
Status: DISCONTINUED
Start: 2020-08-20 | End: 2020-08-20 | Stop reason: HOSPADM

## 2020-08-20 RX ORDER — SODIUM CHLORIDE, SODIUM LACTATE, POTASSIUM CHLORIDE, CALCIUM CHLORIDE 600; 310; 30; 20 MG/100ML; MG/100ML; MG/100ML; MG/100ML
INJECTION, SOLUTION INTRAVENOUS CONTINUOUS
Status: DISCONTINUED | OUTPATIENT
Start: 2020-08-20 | End: 2020-08-20 | Stop reason: HOSPADM

## 2020-08-20 RX ORDER — HALOPERIDOL 5 MG/ML
1 INJECTION INTRAMUSCULAR
Status: DISCONTINUED | OUTPATIENT
Start: 2020-08-20 | End: 2020-08-20 | Stop reason: HOSPADM

## 2020-08-20 RX ORDER — ACETAMINOPHEN 500 MG
500-1000 TABLET ORAL EVERY 6 HOURS PRN
Status: ON HOLD | COMMUNITY
End: 2022-01-19

## 2020-08-20 RX ORDER — DEXAMETHASONE SODIUM PHOSPHATE 4 MG/ML
INJECTION, SOLUTION INTRA-ARTICULAR; INTRALESIONAL; INTRAMUSCULAR; INTRAVENOUS; SOFT TISSUE PRN
Status: DISCONTINUED | OUTPATIENT
Start: 2020-08-20 | End: 2020-08-20 | Stop reason: SURG

## 2020-08-20 RX ORDER — MIDAZOLAM HYDROCHLORIDE 1 MG/ML
1 INJECTION INTRAMUSCULAR; INTRAVENOUS
Status: DISCONTINUED | OUTPATIENT
Start: 2020-08-20 | End: 2020-08-20

## 2020-08-20 RX ORDER — OXYCODONE HYDROCHLORIDE AND ACETAMINOPHEN 5; 325 MG/1; MG/1
1 TABLET ORAL
Status: COMPLETED | OUTPATIENT
Start: 2020-08-20 | End: 2020-08-20

## 2020-08-20 RX ADMIN — ONDANSETRON 8 MG: 2 INJECTION INTRAMUSCULAR; INTRAVENOUS at 07:28

## 2020-08-20 RX ADMIN — POVIDONE-IODINE 15 ML: 10 SOLUTION TOPICAL at 06:46

## 2020-08-20 RX ADMIN — PROPOFOL 200 MG: 10 INJECTION, EMULSION INTRAVENOUS at 07:28

## 2020-08-20 RX ADMIN — SODIUM CHLORIDE, POTASSIUM CHLORIDE, SODIUM LACTATE AND CALCIUM CHLORIDE: 600; 310; 30; 20 INJECTION, SOLUTION INTRAVENOUS at 06:47

## 2020-08-20 RX ADMIN — DEXAMETHASONE SODIUM PHOSPHATE 8 MG: 4 INJECTION, SOLUTION INTRA-ARTICULAR; INTRALESIONAL; INTRAMUSCULAR; INTRAVENOUS; SOFT TISSUE at 07:28

## 2020-08-20 RX ADMIN — KETOROLAC TROMETHAMINE 30 MG: 30 INJECTION, SOLUTION INTRAMUSCULAR at 07:28

## 2020-08-20 RX ADMIN — FENTANYL CITRATE 100 MCG: 50 INJECTION INTRAMUSCULAR; INTRAVENOUS at 07:28

## 2020-08-20 RX ADMIN — FENTANYL CITRATE 50 MCG: 50 INJECTION INTRAMUSCULAR; INTRAVENOUS at 09:38

## 2020-08-20 RX ADMIN — CEFAZOLIN 2 G: 330 INJECTION, POWDER, FOR SOLUTION INTRAMUSCULAR; INTRAVENOUS at 07:28

## 2020-08-20 RX ADMIN — SODIUM CHLORIDE, POTASSIUM CHLORIDE, SODIUM LACTATE AND CALCIUM CHLORIDE: 600; 310; 30; 20 INJECTION, SOLUTION INTRAVENOUS at 07:28

## 2020-08-20 RX ADMIN — MIDAZOLAM HYDROCHLORIDE 2 MG: 1 INJECTION, SOLUTION INTRAMUSCULAR; INTRAVENOUS at 08:36

## 2020-08-20 RX ADMIN — FENTANYL CITRATE 50 MCG: 50 INJECTION INTRAMUSCULAR; INTRAVENOUS at 09:56

## 2020-08-20 RX ADMIN — FENTANYL CITRATE 50 MCG: 50 INJECTION INTRAMUSCULAR; INTRAVENOUS at 08:15

## 2020-08-20 RX ADMIN — OXYCODONE HYDROCHLORIDE AND ACETAMINOPHEN 2 TABLET: 5; 325 TABLET ORAL at 08:44

## 2020-08-20 RX ADMIN — MIDAZOLAM HYDROCHLORIDE 2 MG: 1 INJECTION INTRAMUSCULAR; INTRAVENOUS at 08:36

## 2020-08-20 NOTE — PROGRESS NOTES
Discharge Instructions, Lumpectomy:    Care of Your Incisions:  • You can shower with the dressing on as it is waterproof.  Avoid getting lotions, powders or deodorant on the incision while it is healing.  • There is no need to re-bandage the incisions after the first 48 hours, but it may be more comfortable to tuck a gauze pad inside your bra for the first week. You can buy 4 x 4 gauze dressings at your pharmacy.   • You may have thin paper strips across the incision. These are called Steri-Strips. When they start to curl at the edges, you can peel them off. It is okay to shower with these on.  • Don’t worry if the area under either incision feels firm or hard. This is normal and usually softens within a few months.    How to Manage Discomfort After Surgery:  • It is normal to have tenderness, discomfort or mild swelling at the site of the incisions.     You may also feel:  - Numbness at the incisions.  - Occasional shooting pains in the breast as you heal.    • You will be given a prescription for pain medication prior to being discharged from the hospital. If you are having pain, take the medication as directed by your physician and by the label directions. You should be comfortable and moving around. Most women use some prescription pain medication, though some need only over the counter pain medication, such as ibuprofen (Motrin) or acetaminophen (Tylenol). Some women have little pain and take nothing at all. Whatever amount of pain you have, it is important to listen to your body and use the medication if needed during your recovery.    • Prescription pain medication may cause constipation. If you are having problems, use what you normally would or call your nurse for suggestions. It also helps to stay regular by including fiber in your diet (for example: bran or fruits and vegetables) and drink plenty of liquids (water, juice, etc.).    Activity:  • You may resume your normal routine, but avoid heavy lifting  (over 10 pounds), pushing or pulling until your first post-operative visit, unless otherwise specified by your surgeon.  • Do not drive for at least 24-48 hours after surgery (unless otherwise directed by your surgeon), or while you are taking prescription pain medicine. Prescription pain medicine causes drowsiness (makes you sleepy) so you should avoid doing any tasks where you should be awake and alert (driving, operating machinery, etc).    When to Call Your Doctor/Nurse:  • If you have a fever greater than 100.5, increased pain, redness or warmth at the area around the incision, drainage from the incision or around the drain, or swelling of the arm. Call Dr. Woodruff's office at [phone] with any other questions or concerns.    You should have an appointment to see Dr. Woodruff about a week after surgery, call [phone] if you do not already have an appointment.    Office address:  [address] [place] [address] [address]  [place] [address]      Aspen Woodruff M.D.  [facility]  [phone]

## 2020-08-20 NOTE — OP REPORT
Operative Report     Date: 8/20/2020     Surgeon: Aspen Woodruff M.D.     Assistant: JACK Gamboa     Anesthesiologist: Sarbjit SNELL     Pre-operative Diagnosis:  C50.111 Malignant neoplasm of central portion of right female breast     Post-operative Diagnosis: same      Procedure: re-excision right partial mastectomy     Procedure in detail: The patient was identified in the pre-operative holding area and brought to the operating room. Correct side and site were identified. Pre-operative antibiotics of ancef were administered prior to the procedure. Anesthesia was smoothly induced.The patient was then prepped and draped in the usual sterile fashion.     The skin was infiltrated with local anesthetic and the prior incision reopened and the seroma evacuated. THe cavity was opened and the new margin area identified. Additional tissue was taken from the superior and medial margins, marked for orientation, and sent for permanent pathologic exam.  Meticulous hemostasis was achieved with electrocautery. The area was irrigated and suctioned. The skin was closed in two layers with vicryl and monocryl. Sterile dressings were applied.      The patient was awakened from anesthetic, and was taken to the recovery room in stable condition.     Sponge and needle counts were correct at the end of the case.      Specimen:   1. additional tissue, superior margin  2. Additional tissue, medial margin     EBL: minimal     Dispo: stable, extubated, to PACU     Aspen Woodruff M.D.  [facility]  [phone]

## 2020-08-20 NOTE — ANESTHESIA QCDR
2019 [facility] Clinical Data Registry (for Quality Improvement)     Postoperative nausea/vomiting risk protocol (Adult = 18 yrs and Pediatric 3-17 yrs)- (430 and 463)  General inhalation anesthetic (NOT TIVA) with PONV risk factors: Yes  Provision of anti-emetic therapy with at least 2 different classes of agents: Yes   Patient DID NOT receive anti-emetic therapy and reason is documented in Medical Record:  N/A    Multimodal Pain Management- (477)  Non-emergent surgery AND patient age >= 18: Yes  Use of Multimodal Pain Management, two or more drugs and/or interventions, NOT including systemic opioids:   Exception: Documented allergy to multiple classes of analgesics:     Smoking Abstinence (404)  Patient is current smoker (cigarette, pipe, e-cig, marijuanna):   Elective Surgery:   Abstinence instructions provided prior to day of surgery:   Patient abstained from smoking on day of surgery:     Pre-Op Beta-Blocker in Isolated CABG (44)  Isolated CABG AND patient age >= 18:   Beta-blocker admin within 24 hours of surgical incision:   Exception:of medical reason(s) for not administering beta blocker within 24 hours prior to surgical incision (e.g., not  indicated,other medical reason):     PACU assessment of acute postoperative pain prior to Anesthesia Care End- Applies to Patients Age = 18- (ABG7)  Initial PACU pain score is which of the following: < 7/10  Patient unable to report pain score: N/A    Post-anesthetic transfer of care checklist/protocol to PACU/ICU- (426 and 427)  Upon conclusion of case, patient transferred to which of the following locations: PACU/Non-ICU  Use of transfer checklist/protocol: Yes  Exclusion: Service Performed in Patient Hospital Room (and thus did not require transfer): N/A  Unplanned admission to ICU related to anesthesia service up through end of PACU care- (MD51)  Unplanned admission to ICU (not initially anticipated at anesthesia start time): No

## 2020-08-20 NOTE — OR NURSING
0804 -- Pt arrived from OR. ID verified. Report received. Connected to monitor. 2L O2 via mask. First bag of IVF infusing. Temp 97.5F temporal. Incision right breast CDI, no drainage noted. Pt awake and talking, but not alert.    0815 -- Pt extremely anxious and crying. 50mcg IV fentanyl given for 7/10 right breast/axilla pain. Voicemail left for Dr. Self for orders.    0829 -- telephone order received from Dr. Self for 2mg IV midazolam    0836 -- 2mg IV midazolam given. Pt beginning to calm.    0844 -- Dr. Self at bedside to check on patient. PO percocet given 7/10 right breast/axilla pain.    0908 -- Telephone updates to mom Heather and discharge instructions reviewed. Pt is less anxious and is being talkative with CCT.    0940 -- pt dressed and sitting up in recliner chair. IV fentanyl 50mcg given at 0938 for 7/10. Ice pack placed    0956 -- pt grimacing in pain. Stated no change after previous dose of IV fentanyl given, pain is still 7/10. Repeat 50mcg given.     1025 -- pt's pain improved at 4/10. Ready to go home.    1035 -- PIV removed with tip intact. Discharge instructions reviewed with pt. Pt discharged home in stable condition. Down to pick-up area via wheelchair. All belongings taken.

## 2020-08-20 NOTE — ANESTHESIA POSTPROCEDURE EVALUATION
Patient: Deisy Galo    Procedure Summary     Date: 08/20/20 Room / Location: [facility] ROOM 25 / SURGERY SAME DAY [facility]    Anesthesia Start: 0728 Anesthesia Stop: 0806    Procedure: MASTECTOMY, PARTIAL- RE-EXCISE FOR MARGINS (Right ) Diagnosis: (CANCER OF CENTRAL PORTION OF RIGHT FEMALE BREAST)    Surgeon: Aspen Woodruff M.D. Responsible Provider: Mikal Self M.D.    Anesthesia Type: general ASA Status: 2          Final Anesthesia Type: general  Last vitals  BP   Blood Pressure: (!) 95/63    Temp   37.1 °C (98.7 °F)    Pulse   Pulse: 69   Resp   18    SpO2   99 %      Anesthesia Post Evaluation    Patient location during evaluation: PACU  Patient participation: complete - patient participated  Level of consciousness: awake and alert    Airway patency: patent  Anesthetic complications: no  Cardiovascular status: hemodynamically stable  Respiratory status: acceptable  Hydration status: euvolemic    PONV: none           Nurse Pain Score: 3 (NPRS)

## 2020-08-20 NOTE — DISCHARGE INSTRUCTIONS
Discharge Instructions, Lumpectomy:     Care of Your Incisions:  • You can shower with the dressing on as it is waterproof.  Avoid getting lotions, powders or deodorant on the incision while it is healing.  • There is no need to re-bandage the incisions after the first 48 hours, but it may be more comfortable to tuck a gauze pad inside your bra for the first week. You can buy 4 x 4 gauze dressings at your pharmacy.   • You may have thin paper strips across the incision. These are called Steri-Strips. When they start to curl at the edges, you can peel them off. It is okay to shower with these on.  • Don’t worry if the area under either incision feels firm or hard. This is normal and usually softens within a few months.     How to Manage Discomfort After Surgery:  • It is normal to have tenderness, discomfort or mild swelling at the site of the incisions.      You may also feel:  - Numbness at the incisions.  - Occasional shooting pains in the breast as you heal.     • You will be given a prescription for pain medication prior to being discharged from the hospital. If you are having pain, take the medication as directed by your physician and by the label directions. You should be comfortable and moving around. Most women use some prescription pain medication, though some need only over the counter pain medication, such as ibuprofen (Motrin) or acetaminophen (Tylenol). Some women have little pain and take nothing at all. Whatever amount of pain you have, it is important to listen to your body and use the medication if needed during your recovery.     • Prescription pain medication may cause constipation. If you are having problems, use what you normally would or call your nurse for suggestions. It also helps to stay regular by including fiber in your diet (for example: bran or fruits and vegetables) and drink plenty of liquids (water, juice, etc.).     Activity:  • You may resume your normal routine, but avoid heavy  lifting (over 10 pounds), pushing or pulling until your first post-operative visit, unless otherwise specified by your surgeon.  • Do not drive for at least 24-48 hours after surgery (unless otherwise directed by your surgeon), or while you are taking prescription pain medicine. Prescription pain medicine causes drowsiness (makes you sleepy) so you should avoid doing any tasks where you should be awake and alert (driving, operating machinery, etc).     When to Call Your Doctor/Nurse:  • If you have a fever greater than 100.5, increased pain, redness or warmth at the area around the incision, drainage from the incision or around the drain, or swelling of the arm. Call Dr. Woodruff's office at [phone] with any other questions or concerns.     You should have an appointment to see Dr. Woodruff about a week after surgery, call [phone] if you do not already have an appointment.     Office address:  [address] [place] [address] [id number]  [place] [address]        Aspen Woodruff M.D.  [facility]  [phone]           ACTIVITY: Rest and take it easy for the first 24 hours.  A responsible adult is recommended to remain with you during that time.  It is normal to feel sleepy.  We encourage you to not do anything that requires balance, judgment or coordination.    MILD FLU-LIKE SYMPTOMS ARE NORMAL. YOU MAY EXPERIENCE GENERALIZED MUSCLE ACHES, THROAT IRRITATION, HEADACHE AND/OR SOME NAUSEA.    FOR 24 HOURS DO NOT:  Drive, operate machinery or run household appliances.  Drink beer or alcoholic beverages.   Make important decisions or sign legal documents.    DIET: To avoid nausea, slowly advance diet as tolerated, avoiding spicy or greasy foods for the first day.  Add more substantial food to your diet according to your physician's instructions.  INCREASE FLUIDS AND FIBER TO AVOID CONSTIPATION.    FOLLOW-UP APPOINTMENT:  A follow-up appointment should be arranged with your doctor, call to schedule.    You should CALL YOUR  PHYSICIAN if you develop:  Fever greater than 101 degrees F.  Pain not relieved by medication, or persistent nausea or vomiting.  Excessive bleeding (blood soaking through dressing) or unexpected drainage from the wound.  Extreme redness or swelling around the incision site, drainage of pus or foul smelling drainage.  Inability to urinate or empty your bladder within 8 hours.  Problems with breathing or chest pain.    You should call 911 if you develop problems with breathing or chest pain.  If you are unable to contact your doctor or surgical center, you should go to the nearest emergency room or urgent care center.  Physician's telephone #: [phone] Dr. Woodruff    If any questions arise, call your doctor.  If your doctor is not available, please feel free to call the Surgical Center at [phone].  The Center is open Monday through Friday from 7AM to 7PM.  You can also call the [employer] HOTLINE open 24 hours/day, 7 days/week and speak to a nurse at [phone], or toll free at [phone].    A registered nurse may call you a few days after your surgery to see how you are doing after your procedure.    MEDICATIONS: Resume taking daily medication.  Take prescribed pain medication with food.  If no medication is prescribed, you may take non-aspirin pain medication if needed.  PAIN MEDICATION CAN BE VERY CONSTIPATING.  Take a stool softener or laxative such as senokot, pericolace, or milk of magnesia if needed.    Prescription given for NORCO.  Last pain medication given at 8:44am - next available dose 12:44am.    If your physician has prescribed pain medication that includes Acetaminophen (Tylenol), do not take additional Acetaminophen (Tylenol) while taking the prescribed medication.    Depression / Suicide Risk    As you are discharged from this [facility] facility, it is important to learn how to keep safe from harming yourself.    Recognize the warning signs:  · Abrupt changes in personality,  positive or negative- including increase in energy   · Giving away possessions  · Change in eating patterns- significant weight changes-  positive or negative  · Change in sleeping patterns- unable to sleep or sleeping all the time   · Unwillingness or inability to communicate  · Depression  · Unusual sadness, discouragement and loneliness  · Talk of wanting to die  · Neglect of personal appearance   · Rebelliousness- reckless behavior  · Withdrawal from people/activities they love  · Confusion- inability to concentrate     If you or a loved one observes any of these behaviors or has concerns about self-harm, here's what you can do:  · Talk about it- your feelings and reasons for harming yourself  · Remove any means that you might use to hurt yourself (examples: pills, rope, extension cords, firearm)  · Get professional help from the community (Mental Health, Substance Abuse, psychological counseling)  · Do not be alone:Call your Safe Contact- someone whom you trust who will be there for you.  · Call your local CRISIS HOTLINE 686-4891 or 183-007-1060  · Call your local Children's Mobile Crisis Response Team [place] (269) 332-6920 or www.[employer]  · Call the toll free National Suicide Prevention Hotlines   · National Suicide Prevention Lifeline 002-002-VKNC (8870)  · National Hope Line Network 800-SUICIDE (208-8179)

## 2020-08-20 NOTE — ANESTHESIA PREPROCEDURE EVALUATION
Relevant Problems   No relevant active problems       Physical Exam    Airway   Mallampati: II  TM distance: >3 FB  Neck ROM: full       Cardiovascular - normal exam  Rhythm: regular  Rate: normal  (-) murmur     Dental - normal exam           Pulmonary - normal exam  Breath sounds clear to auscultation     Abdominal    Neurological - normal exam                 Anesthesia Plan    ASA 2       Plan - general             Induction: intravenous    Postoperative Plan: Postoperative administration of opioids is intended.    Pertinent diagnostic labs and testing reviewed    Informed Consent:    Anesthetic plan and risks discussed with patient.    Use of blood products discussed with: patient whom consented to blood products.

## 2020-08-20 NOTE — ANESTHESIA PROCEDURE NOTES
Airway  Performed by: Mikal Self M.D.  Authorized by: Mikal Self M.D.     Location:  OR  Urgency:  Elective  Indications for Airway Management:  Anesthesia      Spontaneous Ventilation: absent    Sedation Level:  Deep  Preoxygenated: Yes    Final Airway Type:  Supraglottic airway  Final Supraglottic Airway:  Standard LMA    Number of Attempts at Approach:  1

## 2020-08-20 NOTE — ANESTHESIA TIME REPORT
Anesthesia Start and Stop Event Times     Date Time Event    8/20/2020 0717 Ready for Procedure     0728 Anesthesia Start     0806 Anesthesia Stop        Responsible Staff  08/20/20    Name Role Begin End    Mikal Self M.D. Anesth [id number]        Preop Diagnosis (Free Text):  Pre-op Diagnosis     CANCER OF CENTRAL PORTION OF RIGHT FEMALE BREAST        Preop Diagnosis (Codes):    Post op Diagnosis  Breast cancer (HCC)      Premium Reason  Non-Premium    Comments:

## 2020-08-24 ENCOUNTER — HOSPITAL ENCOUNTER (OUTPATIENT)
Dept: RADIOLOGY | Facility: MEDICAL CENTER | Age: 45
End: 2020-08-24
Payer: MEDICAID

## 2020-08-25 ENCOUNTER — HOSPITAL ENCOUNTER (OUTPATIENT)
Dept: LAB | Facility: MEDICAL CENTER | Age: 45
End: 2020-08-25
Attending: INTERNAL MEDICINE
Payer: MEDICAID

## 2020-08-25 ENCOUNTER — TELEPHONE (OUTPATIENT)
Dept: RADIATION ONCOLOGY | Facility: MEDICAL CENTER | Age: 45
End: 2020-08-25

## 2020-08-25 ENCOUNTER — HOSPITAL ENCOUNTER (OUTPATIENT)
Dept: RADIATION ONCOLOGY | Facility: MEDICAL CENTER | Age: 45
End: 2020-08-31
Attending: RADIOLOGY
Payer: MEDICAID

## 2020-08-25 VITALS
DIASTOLIC BLOOD PRESSURE: 58 MMHG | SYSTOLIC BLOOD PRESSURE: 100 MMHG | WEIGHT: 167 LBS | OXYGEN SATURATION: 99 % | BODY MASS INDEX: 27.82 KG/M2 | TEMPERATURE: 98.1 F | HEIGHT: 65 IN | HEART RATE: 66 BPM

## 2020-08-25 DIAGNOSIS — C50.811 MALIGNANT NEOPLASM OF OVERLAPPING SITES OF RIGHT BREAST IN FEMALE, ESTROGEN RECEPTOR POSITIVE (HCC): ICD-10-CM

## 2020-08-25 DIAGNOSIS — Z17.0 MALIGNANT NEOPLASM OF OVERLAPPING SITES OF RIGHT BREAST IN FEMALE, ESTROGEN RECEPTOR POSITIVE (HCC): ICD-10-CM

## 2020-08-25 DIAGNOSIS — C50.811 MALIGNANT NEOPLASM OF OVERLAPPING SITES OF RIGHT FEMALE BREAST, UNSPECIFIED ESTROGEN RECEPTOR STATUS (HCC): ICD-10-CM

## 2020-08-25 LAB
ALBUMIN SERPL BCP-MCNC: 4.5 G/DL (ref 3.2–4.9)
ALBUMIN/GLOB SERPL: 2.1 G/DL
ALP SERPL-CCNC: 50 U/L (ref 30–99)
ALT SERPL-CCNC: 21 U/L (ref 2–50)
ANION GAP SERPL CALC-SCNC: 14 MMOL/L (ref 7–16)
AST SERPL-CCNC: 17 U/L (ref 12–45)
BILIRUB SERPL-MCNC: 0.4 MG/DL (ref 0.1–1.5)
BUN SERPL-MCNC: 7 MG/DL (ref 8–22)
CALCIUM SERPL-MCNC: 9.5 MG/DL (ref 8.5–10.5)
CHLORIDE SERPL-SCNC: 101 MMOL/L (ref 96–112)
CO2 SERPL-SCNC: 24 MMOL/L (ref 20–33)
CREAT SERPL-MCNC: 0.56 MG/DL (ref 0.5–1.4)
GLOBULIN SER CALC-MCNC: 2.1 G/DL (ref 1.9–3.5)
GLUCOSE SERPL-MCNC: 91 MG/DL (ref 65–99)
POTASSIUM SERPL-SCNC: 4.4 MMOL/L (ref 3.6–5.5)
PROT SERPL-MCNC: 6.6 G/DL (ref 6–8.2)
SODIUM SERPL-SCNC: 139 MMOL/L (ref 135–145)

## 2020-08-25 PROCEDURE — 99214 OFFICE O/P EST MOD 30 MIN: CPT | Performed by: RADIOLOGY

## 2020-08-25 PROCEDURE — 80053 COMPREHEN METABOLIC PANEL: CPT

## 2020-08-25 PROCEDURE — 99205 OFFICE O/P NEW HI 60 MIN: CPT | Performed by: RADIOLOGY

## 2020-08-25 ASSESSMENT — PAIN SCALES - GENERAL
PAINLEVEL: NO PAIN
PAINLEVEL: NO PAIN

## 2020-08-25 NOTE — NON-PROVIDER
"Patient was seen today in clinic with Dr. Perrin for consultation.  Vitals signs and weight were obtained and pain assessment was completed.  Allergies and medications were reviewed with the patient.  Review of systems completed.     Vitals/Pain:  Vitals:    08/25/20 0837   BP: 100/58   Pulse: 66   Temp: 36.7 °C (98.1 °F)   SpO2: 99%   Weight: 75.8 kg (167 lb)   Height: 1.651 m (5' 5\")   Pain Score: No pain        Allergies:   Latex    Current Medications:  Current Outpatient Medications   Medication Sig Dispense Refill   • acetaminophen (TYLENOL) 500 MG Tab Take 500-1,000 mg by mouth every 6 hours as needed.     • Ibuprofen 200 MG Cap Take 200 mg by mouth as needed. Took 3 tabs yesterday  Indications: Inflammation     • docusate sodium (COLACE) 100 MG Cap Take 100 mg by mouth 2 times a day.     • ondansetron (ZOFRAN ODT) 4 MG TABLET DISPERSIBLE Take 4 mg by mouth every 6 hours as needed for Nausea.     • NON SPECIFIED \"Turkey Tail\"     • NON SPECIFIED \"Emergen C with Probiotics\"       No current facility-administered medications for this encounter.          PCP:  Pcp Pt States None        Aster Jalloh R.N.  "

## 2020-08-25 NOTE — CONSULTS
RADIATION ONCOLOGY CONSULT    DATE OF SERVICE: 8/25/2020    IDENTIFICATION: A 44 y.o. female with Grade 3 infiltrating ductal carcinoma of the right breast, triple positive.  Tumor was ER 60 AK 40 Ki-67 25-30 HER-2/manuel triple positive.She had 2 sentinel nodes positive there was positive lymphatic vessel invasion largest met was 1.7 cm and there was extranodal extension.She had positive margins on initial lumpectomy was 6 size twice with final margins being negative..  She is here at the kind request of Dr. Aspen Woodruff.      HISTORY OF PRESENT ILLNESS: Patient's history dates back to about March of this year when she noted a lump in the periareolar region of her right breast.  She ultimately underwent a diagnostic mammogram and ultrasound on 6/6/2020.  There was noted to be clustered microcalcifications in the right breast at the site of the lump on ultrasound there was a periareolar mass measuring 1.7 x 2.0 x 1.7.  There were also 2 fibroadenomas: one in the right breast and one in the left breast.  Axilla bilaterally was negative.  She then underwent a biopsy of the periareolar mass on the right on 7/7/2020 this revealed a grade 3 infiltrating ductal carcinoma.  It was HER-2/manuel triple positive, ER 60% AK 40% with a Ki-67 of 25 to 30%.  She then underwent a lumpectomy 8/7/2020.  Tumor was 2.4 cm there was lymph attic vessel invasion.The medial resection margin was focally positive for DCIS.all other margins were negative.  She then underwent a reexcision on8/14/2020 there was still an involved margin superiorly of high-grade DCIS.  Because of this an additional excision was performed 8/20/2020.This showed residual high-grade DCIS but final margin DCIS was greater than 2 mm from the superior margin.  Postoperatively she has been doing well she is here to discuss radiation therapy in her overall treatment plan.  She plans to see Dr. Suzie sabillon later this afternoon.  Tomorrow she has scheduled CT scan chest abdomen  pelvis and bone scan for staging.    PAST MEDICAL HISTORY:   Past Medical History:   Diagnosis Date   • Anesthesia     states has very low BP   • Cancer (HCC)     Right breast  IDC/DCIS   • Heart burn     changed diet says much improved   • Indigestion    • Pain 2020    right breast, -2/10   • Snoring     no sleep study       PAST SURGICAL HISTORY:  Past Surgical History:   Procedure Laterality Date   • PARTIAL MASTECTOMY Right 2020    Procedure: MASTECTOMY, PARTIAL- RE-EXCISE FOR MARGINS;  Surgeon: Aspen Woodruff M.D.;  [facility]GERY [facility];  Service: General   • PARTIAL MASTECTOMY Right 2020    Procedure: MASTECTOMY, PARTIAL- RE EXCISION FOR MARGINS;  Surgeon: Aspen Woodruff M.D.;  Location: SURGERY [facility];  Service: General   • PARTIAL MASTECTOMY Right 2020    Procedure: MASTECTOMY, PARTIAL;  Surgeon: Aspen Woodruff M.D.;  Location: SURGERY [facility];  Service: General   • NODE BIOPSY SENTINEL Right 2020    Procedure: BIOPSY, LYMPH NODE, SENTINEL;  Surgeon: Aspen Woodruff M.D.;  Location: SURGERY [facility];  Service: General   • CATH PLACEMENT Left 2020    Procedure: INSERTION, CATHETER-PORT PLACEMENT;  Surgeon: Aspen Woodruff M.D.;  Location: SURGERY [facility];  Service: General       Gynecological History:    Last Menstrual Cycle: 8/15/2020   & Para:   : 4, Para: 2 and Number of Interrupted Pregnancies: 2  Gynecological comments: used BCP on/off for couple of years.      CURRENT MEDICATIONS:  Current Outpatient Medications   Medication Sig Dispense Refill   • acetaminophen (TYLENOL) 500 MG Tab Take 500-1,000 mg by mouth every 6 hours as needed.     • Ibuprofen 200 MG Cap Take 200 mg by mouth as needed. Took 3 tabs yesterday  Indications: Inflammation     • docusate sodium (COLACE) 100 MG Cap Take 100 mg by mouth 2 times a day.     • ondansetron (ZOFRAN ODT) 4 MG TABLET DISPERSIBLE Take 4 mg by mouth every  "6 hours as needed for Nausea.     • NON SPECIFIED \"Turkey Tail\"     • NON SPECIFIED \"Emergen C with Probiotics\"       No current facility-administered medications for this encounter.        ALLERGIES:    Latex    FAMILY HISTORY:    Family History   Problem Relation Age of Onset   • Cancer Brother         BCC   • Cancer Maternal Uncle         lung cancer   • Cancer Maternal Grandmother         colon cancer/melanoma   • Cancer Maternal Grandfather         lymphoma   • Cancer Maternal Uncle         skin cancer (unknown type)   • Cancer Maternal great-grandmother         breast cancer   [unfilled]        SOCIAL HISTORY:     reports that she has never smoked. She has never used smokeless tobacco. She reports previous alcohol use. She reports that she does not use drugs.   Patient is , lives in [place], [place] and has 2 children. 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       REVIEW OF SYSTEMS: Pertinent positives consist of  The mass in the breast and associated tenderness decreased range of motion of the right upper extremity due to the axillary node dissection.  She has fatigue abdominal pain constipation and nausea from the pain pills.  She has headaches neuropathy in the right arm.  The rest of the review of systems is negative and has been reviewed by me. It is in the nursing note dated 8/25/2020 in [facility]    Pain Scale: 0-10  Pain Assessement: 0/10  Pain Location, Orientation and Scale: no complaint of pain.       PHYSICAL EXAM:    0= Fully active, able to carry on all pre-disease performance without restriction.  Vitals:    08/25/20 0837   BP: 100/58   Pulse: 66   Temp: 36.7 °C (98.1 °F)   SpO2: 99%   Weight: 75.8 kg (167 lb)   Height: 1.651 m (5' 5\")   Pain Score: No pain        GENERAL: Well-appearing alert and oriented x3 in no apparent distress  Breasts: Right breast is evidence of the recent surgery with postoperative ecchymotic changes and eschar formation.  No discrete masses appreciated " in either breast or axilla.  HEENT:  Pupils are equal, round, and reactive to light.  Extraocular muscles   are intact. Sclerae nonicteric.  Conjunctivae pink.  Oral cavity, tongue   protrudes midline.   NECK:   No peripheral adenopathy of the neck, supraclavicular fossa or axillae   bilaterally.  LUNGS:  Clear to ascultation   HEART:  Regular rate and rhythm.  No murmur appreciated  ABDOMEN:  Soft. No evidence of hepatosplenomegaly.    EXTREMITIES:  Without Edema.  NEUROLOGIC:  Cranial nerves II through XII were intact. Normal stance and gait motor and sensory grossly within normal limits            IMPRESSION:    A 44 y.o. with   Grade 3 infiltrating ductal carcinoma of the right breast, triple positive.  Tumor was ER 60 DE 40 Ki-67 25-30 HER-2/manuel triple positive.She had 2 sentinel nodes positive there was positive lymphatic vessel invasion largest met was 1.7 cm and there was extranodal extension.She had positive margins on initial lumpectomy was 6 size twice with final margins being negative..      RECOMMENDATIONS:   I had a long discussion with the patient and her mother regarding diagnosis prognosis and treatment.  The understand that because she is HER-2/manuel positive that she is going to require chemotherapy upfront this will be followed by radiation therapy.We also discussed diet modification to improve survival.  I've described the details of radiation along with the side effects both acute and chronic, including but not exclusive to fatigue, skin reaction, local soreness, swelling, delayed healing, scarring fibrosis discoloration. Ample time was allowed for questions, and patient understands.    Patient will give us a call after she is completed chemotherapy to set up radiation treatments.    Thank you for the opportunity to participate in her care.  If any questions or comments, please do not hesitate in calling.    Please note that this dictation was created using voice recognition software. I have made  every reasonable attempt to correct obvious errors, but I expect that there are errors of grammar and possibly content that I did not discover before finalizing the note.

## 2020-08-25 NOTE — ADDENDUM NOTE
Encounter addended by: Juana Perrin M.D. on: 8/25/2020 11:18 AM   Actions taken: Visit diagnoses modified, Problem List modified

## 2020-08-26 ENCOUNTER — HOSPITAL ENCOUNTER (OUTPATIENT)
Dept: RADIOLOGY | Facility: MEDICAL CENTER | Age: 45
End: 2020-08-26
Attending: SURGERY
Payer: MEDICAID

## 2020-08-26 ENCOUNTER — PATIENT MESSAGE (OUTPATIENT)
Dept: HEALTH INFORMATION MANAGEMENT | Facility: OTHER | Age: 45
End: 2020-08-26

## 2020-08-26 DIAGNOSIS — C50.111 MALIGNANT NEOPLASM OF CENTRAL PORTION OF RIGHT FEMALE BREAST, UNSPECIFIED ESTROGEN RECEPTOR STATUS (HCC): ICD-10-CM

## 2020-08-26 PROCEDURE — 71260 CT THORAX DX C+: CPT

## 2020-08-26 PROCEDURE — 700117 HCHG RX CONTRAST REV CODE 255: Performed by: SURGERY

## 2020-08-26 PROCEDURE — A9503 TC99M MEDRONATE: HCPCS

## 2020-08-26 RX ADMIN — IOHEXOL 80 ML: 350 INJECTION, SOLUTION INTRAVENOUS at 13:22

## 2020-09-11 ENCOUNTER — HOSPITAL ENCOUNTER (OUTPATIENT)
Dept: CARDIOLOGY | Facility: MEDICAL CENTER | Age: 45
End: 2020-09-11
Attending: INTERNAL MEDICINE
Payer: MEDICAID

## 2020-09-11 DIAGNOSIS — Z79.899 NEED FOR PROPHYLACTIC CHEMOTHERAPY: ICD-10-CM

## 2020-09-11 LAB
LV EJECT FRACT  99904: 55
LV EJECT FRACT MOD 2C 99903: 55.01
LV EJECT FRACT MOD 4C 99902: 56.15
LV EJECT FRACT MOD BP 99901: 53.47

## 2020-09-11 PROCEDURE — 93306 TTE W/DOPPLER COMPLETE: CPT | Mod: 26 | Performed by: INTERNAL MEDICINE

## 2020-09-11 PROCEDURE — 93306 TTE W/DOPPLER COMPLETE: CPT

## 2020-09-13 NOTE — PROGRESS NOTES
"Non face to face prolonged services of clinical data and chart information reviewed for a total time of 30 performed on 9/11,12  For Paige Galo  Reviewed were:    Referral    Previous encounters    Imaging all mammography, colonoscopy, CT/tomography, MRI and PET studies    Previous procedures all biopsy and surgical procedures including pathology studies and interpretation     Notes    Media all personal and family clinical data from outside institutions including germline DNA studies    Miscellaneous reports    Patient summaries family history as documented by referring clinicician  Counseling, testing information and materials prepared  \"I spent 30 minutes  from 1300 to 1330 reviewing past records, prior to visit pertaining to genetic risk.\"   Christian Lowry M.D  edited  "

## 2020-09-15 ENCOUNTER — OFFICE VISIT (OUTPATIENT)
Dept: HEMATOLOGY ONCOLOGY | Facility: MEDICAL CENTER | Age: 45
End: 2020-09-15
Payer: MEDICAID

## 2020-09-15 VITALS
BODY MASS INDEX: 27.75 KG/M2 | RESPIRATION RATE: 18 BRPM | DIASTOLIC BLOOD PRESSURE: 68 MMHG | SYSTOLIC BLOOD PRESSURE: 110 MMHG | WEIGHT: 166.56 LBS | HEIGHT: 65 IN | OXYGEN SATURATION: 96 % | TEMPERATURE: 98.2 F | HEART RATE: 76 BPM

## 2020-09-15 DIAGNOSIS — Z80.7 FAMILY HISTORY OF LYMPHOMA: ICD-10-CM

## 2020-09-15 DIAGNOSIS — C50.411 MALIGNANT NEOPLASM OF UPPER-OUTER QUADRANT OF RIGHT FEMALE BREAST, UNSPECIFIED ESTROGEN RECEPTOR STATUS (HCC): ICD-10-CM

## 2020-09-15 DIAGNOSIS — Z80.8 FAMILY HISTORY OF MELANOMA: ICD-10-CM

## 2020-09-15 DIAGNOSIS — Z80.41 FAMILY HISTORY OF OVARIAN CANCER: ICD-10-CM

## 2020-09-15 PROCEDURE — 99358 PROLONG SERVICE W/O CONTACT: CPT | Performed by: MEDICAL GENETICS

## 2020-09-15 PROCEDURE — 99203 OFFICE O/P NEW LOW 30 MIN: CPT | Performed by: MEDICAL GENETICS

## 2020-09-15 ASSESSMENT — PATIENT HEALTH QUESTIONNAIRE - PHQ9: CLINICAL INTERPRETATION OF PHQ2 SCORE: 0

## 2020-09-15 NOTE — NON-PROVIDER
Kit was sent home with patient to complete and send off to [facility] via [employer] due to COVID19 and Provider's preference.      Instructed patient on how to collect successful saliva specimen, all necessary documents were sent with the kit. Patient agreed and verbalized understanding.

## 2020-09-15 NOTE — PROGRESS NOTES
GENETIC RISK ASSESSMENT    Deisy Galo is a 44 y.o. year old patient who was referred by Aydin for cancer genetic risk assessment.    Chief Complaint: breast cancer and family history of cancer    HPI: The patient was well until 3 months ago at which time a suspicious physical exam caused the patient to be referred for imaging ). A suspicious (mammogram study identified a (right) breast mass. A biopsy was performed and a specimen was submitted to pathology.     A diagnosis of  carcinoma was made.   The patient's GM (ovary), GF (lymphoma) and uncle (kaylynn)  Review of personal and family histories suggested that a cancer genetic risk assessment was in order.    Review of Systems (ROS):  Constitutional N  Eyes N  ENT N   Respiratory N  Cardiovascular N  Gastrointestinal N  Genitourinary N  Musculoskeletal N  Skin N  Neurological N  Endocrine N  Psychiatric N  Hematologic/lymphatic N  Allergic/Immunologic none  All other systems reviewed and are negative.    History (Past/Family)  Family History:   Family History   Problem Relation Age of Onset   • Cancer Brother         BCC   • Cancer Maternal Uncle         lung cancer   • Cancer Maternal Grandmother         colon cancer/melanoma   • Cancer Maternal Grandfather         lymphoma   • Cancer Maternal Uncle         skin cancer (unknown type)   • Cancer Maternal great-grandmother         breast cancer      3 generation pedigree built   Yes   Rashmi empiric risk calculation No   [place] empiric risk calculation  No    Social History:       Social History     Socioeconomic History   • Marital status:      Spouse name: Not on file   • Number of children: Not on file   • Years of education: Not on file   • Highest education level: Not on file   Occupational History   • Not on file   Social Needs   • Financial resource strain: Not on file   • Food insecurity     Worry: Not on file     Inability: Not on file   • Transportation needs     Medical: Not on file  "    Non-medical: Not on file   Tobacco Use   • Smoking status: Never Smoker   • Smokeless tobacco: Never Used   Substance and Sexual Activity   • Alcohol use: Not Currently     Comment: occ   • Drug use: No   • Sexual activity: Not on file   Lifestyle   • Physical activity     Days per week: Not on file     Minutes per session: Not on file   • Stress: Not on file   Relationships   • Social connections     Talks on phone: Not on file     Gets together: Not on file     Attends Gnosticist service: Not on file     Active member of club or organization: Not on file     Attends meetings of clubs or organizations: Not on file     Relationship status: Not on file   • Intimate partner violence     Fear of current or ex partner: Not on file     Emotionally abused: Not on file     Physically abused: Not on file     Forced sexual activity: Not on file   Other Topics Concern   • Not on file   Social History Narrative   • Not on file       Patient Past History:  Past Medical History:   Diagnosis Date   • Anesthesia     states has very low BP   • Cancer (HCC) 2020    Right breast  IDC/DCIS   • Heart burn 2020    changed diet says much improved   • Indigestion    • Pain 08/13/2020    right breast, 1-2/10   • Snoring     no sleep study           NCCN Testing Criteria Met                            Yes    Physical Exam  Constitutional    /68 (BP Location: Left arm, Patient Position: Sitting, BP Cuff Size: Adult)   Pulse 76   Temp 36.8 °C (98.2 °F) (Temporal)   Resp 18   Ht 1.651 m (5' 5\")   Wt 75.5 kg (166 lb 8.9 oz)   SpO2 96%   BMI 27.72 kg/m²        NO PE per covid protocol     Assessment and Plan:  Patient with diagnosis of breast cancer and family history of cancer    I spent a total of 30 minutes of face to face time with >50% of time spent in counseling and coordination of care discussing matters stated in above note.  [facility] panel ordered      Christian Lowry M.D.  "

## 2020-09-30 ENCOUNTER — HOSPITAL ENCOUNTER (OUTPATIENT)
Dept: LAB | Facility: MEDICAL CENTER | Age: 45
End: 2020-09-30
Attending: NURSE PRACTITIONER
Payer: MEDICAID

## 2020-09-30 LAB
APPEARANCE UR: ABNORMAL
BACTERIA #/AREA URNS HPF: ABNORMAL /HPF
BASOPHILS # BLD AUTO: 0.3 % (ref 0–1.8)
BASOPHILS # BLD: 0.03 K/UL (ref 0–0.12)
BILIRUB UR QL STRIP.AUTO: NEGATIVE
COLOR UR: YELLOW
EOSINOPHIL # BLD AUTO: 0.02 K/UL (ref 0–0.51)
EOSINOPHIL NFR BLD: 0.2 % (ref 0–6.9)
EPI CELLS #/AREA URNS HPF: ABNORMAL /HPF
ERYTHROCYTE [DISTWIDTH] IN BLOOD BY AUTOMATED COUNT: 42 FL (ref 35.9–50)
GLUCOSE UR STRIP.AUTO-MCNC: NEGATIVE MG/DL
HCT VFR BLD AUTO: 38 % (ref 37–47)
HGB BLD-MCNC: 12.7 G/DL (ref 12–16)
HYALINE CASTS #/AREA URNS LPF: ABNORMAL /LPF
IMM GRANULOCYTES # BLD AUTO: 0.07 K/UL (ref 0–0.11)
IMM GRANULOCYTES NFR BLD AUTO: 0.7 % (ref 0–0.9)
KETONES UR STRIP.AUTO-MCNC: NEGATIVE MG/DL
LEUKOCYTE ESTERASE UR QL STRIP.AUTO: ABNORMAL
LYMPHOCYTES # BLD AUTO: 1.27 K/UL (ref 1–4.8)
LYMPHOCYTES NFR BLD: 13.6 % (ref 22–41)
MCH RBC QN AUTO: 31.5 PG (ref 27–33)
MCHC RBC AUTO-ENTMCNC: 33.4 G/DL (ref 33.6–35)
MCV RBC AUTO: 94.3 FL (ref 81.4–97.8)
MICRO URNS: ABNORMAL
MONOCYTES # BLD AUTO: 0.77 K/UL (ref 0–0.85)
MONOCYTES NFR BLD AUTO: 8.2 % (ref 0–13.4)
NEUTROPHILS # BLD AUTO: 7.2 K/UL (ref 2–7.15)
NEUTROPHILS NFR BLD: 77 % (ref 44–72)
NITRITE UR QL STRIP.AUTO: NEGATIVE
NRBC # BLD AUTO: 0 K/UL
NRBC BLD-RTO: 0 /100 WBC
PH UR STRIP.AUTO: 6.5 [PH] (ref 5–8)
PLATELET # BLD AUTO: 205 K/UL (ref 164–446)
PMV BLD AUTO: 11.2 FL (ref 9–12.9)
PROT UR QL STRIP: NEGATIVE MG/DL
RBC # BLD AUTO: 4.03 M/UL (ref 4.2–5.4)
RBC # URNS HPF: ABNORMAL /HPF
RBC UR QL AUTO: NEGATIVE
SP GR UR STRIP.AUTO: 1.01
UROBILINOGEN UR STRIP.AUTO-MCNC: 0.2 MG/DL
WBC # BLD AUTO: 9.4 K/UL (ref 4.8–10.8)
WBC #/AREA URNS HPF: ABNORMAL /HPF

## 2020-09-30 PROCEDURE — 36415 COLL VENOUS BLD VENIPUNCTURE: CPT

## 2020-09-30 PROCEDURE — 85025 COMPLETE CBC W/AUTO DIFF WBC: CPT

## 2020-09-30 PROCEDURE — 81001 URINALYSIS AUTO W/SCOPE: CPT

## 2020-09-30 PROCEDURE — 87086 URINE CULTURE/COLONY COUNT: CPT

## 2020-10-02 ENCOUNTER — HOSPITAL ENCOUNTER (OUTPATIENT)
Dept: LAB | Facility: MEDICAL CENTER | Age: 45
End: 2020-10-02
Attending: INTERNAL MEDICINE
Payer: MEDICAID

## 2020-10-02 PROCEDURE — 85025 COMPLETE CBC W/AUTO DIFF WBC: CPT

## 2020-10-02 PROCEDURE — 80053 COMPREHEN METABOLIC PANEL: CPT

## 2020-10-02 PROCEDURE — 83735 ASSAY OF MAGNESIUM: CPT

## 2020-10-03 LAB
ALBUMIN SERPL BCP-MCNC: 4 G/DL (ref 3.2–4.9)
ALBUMIN/GLOB SERPL: 1.7 G/DL
ALP SERPL-CCNC: 76 U/L (ref 30–99)
ALT SERPL-CCNC: 24 U/L (ref 2–50)
ANION GAP SERPL CALC-SCNC: 13 MMOL/L (ref 7–16)
AST SERPL-CCNC: 15 U/L (ref 12–45)
BACTERIA UR CULT: NORMAL
BASOPHILS # BLD AUTO: 0.3 % (ref 0–1.8)
BASOPHILS # BLD: 0.02 K/UL (ref 0–0.12)
BILIRUB SERPL-MCNC: 0.4 MG/DL (ref 0.1–1.5)
BUN SERPL-MCNC: 7 MG/DL (ref 8–22)
CALCIUM SERPL-MCNC: 8.8 MG/DL (ref 8.5–10.5)
CHLORIDE SERPL-SCNC: 101 MMOL/L (ref 96–112)
CO2 SERPL-SCNC: 24 MMOL/L (ref 20–33)
CREAT SERPL-MCNC: 0.66 MG/DL (ref 0.5–1.4)
EOSINOPHIL # BLD AUTO: 0.01 K/UL (ref 0–0.51)
EOSINOPHIL NFR BLD: 0.2 % (ref 0–6.9)
ERYTHROCYTE [DISTWIDTH] IN BLOOD BY AUTOMATED COUNT: 43.3 FL (ref 35.9–50)
GLOBULIN SER CALC-MCNC: 2.3 G/DL (ref 1.9–3.5)
GLUCOSE SERPL-MCNC: 76 MG/DL (ref 65–99)
HCT VFR BLD AUTO: 37.8 % (ref 37–47)
HGB BLD-MCNC: 12.3 G/DL (ref 12–16)
IMM GRANULOCYTES # BLD AUTO: 0.02 K/UL (ref 0–0.11)
IMM GRANULOCYTES NFR BLD AUTO: 0.3 % (ref 0–0.9)
LYMPHOCYTES # BLD AUTO: 1.61 K/UL (ref 1–4.8)
LYMPHOCYTES NFR BLD: 24.3 % (ref 22–41)
MAGNESIUM SERPL-MCNC: 1.9 MG/DL (ref 1.5–2.5)
MCH RBC QN AUTO: 31.5 PG (ref 27–33)
MCHC RBC AUTO-ENTMCNC: 32.5 G/DL (ref 33.6–35)
MCV RBC AUTO: 96.7 FL (ref 81.4–97.8)
MONOCYTES # BLD AUTO: 0.74 K/UL (ref 0–0.85)
MONOCYTES NFR BLD AUTO: 11.2 % (ref 0–13.4)
NEUTROPHILS # BLD AUTO: 4.22 K/UL (ref 2–7.15)
NEUTROPHILS NFR BLD: 63.7 % (ref 44–72)
NRBC # BLD AUTO: 0 K/UL
NRBC BLD-RTO: 0 /100 WBC
PLATELET # BLD AUTO: 215 K/UL (ref 164–446)
PMV BLD AUTO: 10.7 FL (ref 9–12.9)
POTASSIUM SERPL-SCNC: 4.1 MMOL/L (ref 3.6–5.5)
PROT SERPL-MCNC: 6.3 G/DL (ref 6–8.2)
RBC # BLD AUTO: 3.91 M/UL (ref 4.2–5.4)
SIGNIFICANT IND 70042: NORMAL
SITE SITE: NORMAL
SODIUM SERPL-SCNC: 138 MMOL/L (ref 135–145)
SOURCE SOURCE: NORMAL
WBC # BLD AUTO: 6.6 K/UL (ref 4.8–10.8)

## 2020-10-12 NOTE — PROGRESS NOTES
"Non face to face prolonged services of clinical data and chart information reviewed for a total time of 30 performed on 10/9,10,11 for DOMINICK Galo  Reviewed were:    Referral    Previous encounters    Imaging mammography, colonoscopy, CT/tomography, MRI, PET    Previous procedures all biopsy and surgical procedures including pathology analysis    Notes    Media all personal and family history from outside institutions including germline DNA analysis    Miscellaneous reports    Patient summaries family history as documented by referring clinician  Counseling, testing information and materials prepared  \"I spent 30 minutes  from 1600 to 1630 reviewing past records, prior to visit pertaining to genetic risk.\"   Christian Lowry M.D.  edited  "

## 2020-10-13 ENCOUNTER — TELEMEDICINE (OUTPATIENT)
Dept: HEMATOLOGY ONCOLOGY | Facility: MEDICAL CENTER | Age: 45
End: 2020-10-13
Payer: MEDICAID

## 2020-10-13 DIAGNOSIS — Z80.9 FAMILY HISTORY OF CANCER: ICD-10-CM

## 2020-10-13 PROCEDURE — 99212 OFFICE O/P EST SF 10 MIN: CPT | Performed by: MEDICAL GENETICS

## 2020-10-13 RX ORDER — FAMOTIDINE 20 MG/1
20 TABLET, FILM COATED ORAL 2 TIMES DAILY
Status: ON HOLD | COMMUNITY
End: 2022-01-19

## 2020-10-13 RX ORDER — LORATADINE 10 MG/1
CAPSULE, LIQUID FILLED ORAL
Status: ON HOLD | COMMUNITY
End: 2022-01-19

## 2020-10-13 RX ORDER — TRAMADOL HYDROCHLORIDE 50 MG/1
100 TABLET ORAL EVERY 4 HOURS PRN
Status: ON HOLD | COMMUNITY
End: 2022-01-19

## 2020-10-13 NOTE — PROGRESS NOTES
Meeting conducted virtually via zoom  Patient identified and permission obtained    Patient originally referred for cancer genetic risk assessment, counseling and testing  The patient and I previously discussed familial implications and genetic discrimination  The patient agreed to testing and presents today for result review    Results    [facility] genetics  CancerNext 36 gene panel    NO PATHOGENIC VARIANTS IDENTIFIED    All questions answered

## 2020-10-23 ENCOUNTER — HOSPITAL ENCOUNTER (OUTPATIENT)
Dept: LAB | Facility: MEDICAL CENTER | Age: 45
End: 2020-10-23
Attending: INTERNAL MEDICINE
Payer: MEDICAID

## 2020-10-23 LAB
ALBUMIN SERPL BCP-MCNC: 4.2 G/DL (ref 3.2–4.9)
ALBUMIN/GLOB SERPL: 1.4 G/DL
ALP SERPL-CCNC: 77 U/L (ref 30–99)
ALT SERPL-CCNC: 58 U/L (ref 2–50)
ANION GAP SERPL CALC-SCNC: 11 MMOL/L (ref 7–16)
AST SERPL-CCNC: 34 U/L (ref 12–45)
BASOPHILS # BLD AUTO: 0.2 % (ref 0–1.8)
BASOPHILS # BLD: 0.01 K/UL (ref 0–0.12)
BILIRUB SERPL-MCNC: 0.4 MG/DL (ref 0.1–1.5)
BUN SERPL-MCNC: 8 MG/DL (ref 8–22)
CALCIUM SERPL-MCNC: 9.7 MG/DL (ref 8.5–10.5)
CHLORIDE SERPL-SCNC: 101 MMOL/L (ref 96–112)
CO2 SERPL-SCNC: 28 MMOL/L (ref 20–33)
CREAT SERPL-MCNC: 0.54 MG/DL (ref 0.5–1.4)
EOSINOPHIL # BLD AUTO: 0 K/UL (ref 0–0.51)
EOSINOPHIL NFR BLD: 0 % (ref 0–6.9)
ERYTHROCYTE [DISTWIDTH] IN BLOOD BY AUTOMATED COUNT: 42.2 FL (ref 35.9–50)
GLOBULIN SER CALC-MCNC: 2.9 G/DL (ref 1.9–3.5)
GLUCOSE SERPL-MCNC: 94 MG/DL (ref 65–99)
HCT VFR BLD AUTO: 35 % (ref 37–47)
HGB BLD-MCNC: 11.8 G/DL (ref 12–16)
IMM GRANULOCYTES # BLD AUTO: 0.01 K/UL (ref 0–0.11)
IMM GRANULOCYTES NFR BLD AUTO: 0.2 % (ref 0–0.9)
LYMPHOCYTES # BLD AUTO: 1.48 K/UL (ref 1–4.8)
LYMPHOCYTES NFR BLD: 24.5 % (ref 22–41)
MAGNESIUM SERPL-MCNC: 1.6 MG/DL (ref 1.5–2.5)
MCH RBC QN AUTO: 31.4 PG (ref 27–33)
MCHC RBC AUTO-ENTMCNC: 33.7 G/DL (ref 33.6–35)
MCV RBC AUTO: 93.1 FL (ref 81.4–97.8)
MONOCYTES # BLD AUTO: 0.68 K/UL (ref 0–0.85)
MONOCYTES NFR BLD AUTO: 11.3 % (ref 0–13.4)
NEUTROPHILS # BLD AUTO: 3.86 K/UL (ref 2–7.15)
NEUTROPHILS NFR BLD: 63.8 % (ref 44–72)
NRBC # BLD AUTO: 0 K/UL
NRBC BLD-RTO: 0 /100 WBC
PLATELET # BLD AUTO: 131 K/UL (ref 164–446)
PMV BLD AUTO: 10.4 FL (ref 9–12.9)
POTASSIUM SERPL-SCNC: 3.6 MMOL/L (ref 3.6–5.5)
PROT SERPL-MCNC: 7.1 G/DL (ref 6–8.2)
RBC # BLD AUTO: 3.76 M/UL (ref 4.2–5.4)
SODIUM SERPL-SCNC: 140 MMOL/L (ref 135–145)
WBC # BLD AUTO: 6 K/UL (ref 4.8–10.8)

## 2020-10-23 PROCEDURE — 85025 COMPLETE CBC W/AUTO DIFF WBC: CPT

## 2020-10-23 PROCEDURE — 83735 ASSAY OF MAGNESIUM: CPT

## 2020-10-23 PROCEDURE — 80053 COMPREHEN METABOLIC PANEL: CPT

## 2020-10-23 PROCEDURE — 36415 COLL VENOUS BLD VENIPUNCTURE: CPT

## 2020-11-13 ENCOUNTER — HOSPITAL ENCOUNTER (OUTPATIENT)
Dept: LAB | Facility: MEDICAL CENTER | Age: 45
End: 2020-11-13
Attending: INTERNAL MEDICINE
Payer: MEDICAID

## 2020-11-13 LAB
ALBUMIN SERPL BCP-MCNC: 4.2 G/DL (ref 3.2–4.9)
ALBUMIN/GLOB SERPL: 1.6 G/DL
ALP SERPL-CCNC: 72 U/L (ref 30–99)
ALT SERPL-CCNC: 37 U/L (ref 2–50)
ANION GAP SERPL CALC-SCNC: 14 MMOL/L (ref 7–16)
AST SERPL-CCNC: 19 U/L (ref 12–45)
BASOPHILS # BLD AUTO: 0.2 % (ref 0–1.8)
BASOPHILS # BLD: 0.01 K/UL (ref 0–0.12)
BILIRUB SERPL-MCNC: 0.3 MG/DL (ref 0.1–1.5)
BUN SERPL-MCNC: 14 MG/DL (ref 8–22)
CALCIUM SERPL-MCNC: 9.6 MG/DL (ref 8.5–10.5)
CHLORIDE SERPL-SCNC: 99 MMOL/L (ref 96–112)
CO2 SERPL-SCNC: 24 MMOL/L (ref 20–33)
CREAT SERPL-MCNC: 0.62 MG/DL (ref 0.5–1.4)
EOSINOPHIL # BLD AUTO: 0 K/UL (ref 0–0.51)
EOSINOPHIL NFR BLD: 0 % (ref 0–6.9)
ERYTHROCYTE [DISTWIDTH] IN BLOOD BY AUTOMATED COUNT: 48.7 FL (ref 35.9–50)
GLOBULIN SER CALC-MCNC: 2.7 G/DL (ref 1.9–3.5)
GLUCOSE SERPL-MCNC: 85 MG/DL (ref 65–99)
HCT VFR BLD AUTO: 30.9 % (ref 37–47)
HGB BLD-MCNC: 10.7 G/DL (ref 12–16)
IMM GRANULOCYTES # BLD AUTO: 0.03 K/UL (ref 0–0.11)
IMM GRANULOCYTES NFR BLD AUTO: 0.6 % (ref 0–0.9)
LYMPHOCYTES # BLD AUTO: 1.69 K/UL (ref 1–4.8)
LYMPHOCYTES NFR BLD: 31.2 % (ref 22–41)
MAGNESIUM SERPL-MCNC: 1.6 MG/DL (ref 1.5–2.5)
MCH RBC QN AUTO: 32.6 PG (ref 27–33)
MCHC RBC AUTO-ENTMCNC: 34.6 G/DL (ref 33.6–35)
MCV RBC AUTO: 94.2 FL (ref 81.4–97.8)
MONOCYTES # BLD AUTO: 0.58 K/UL (ref 0–0.85)
MONOCYTES NFR BLD AUTO: 10.7 % (ref 0–13.4)
NEUTROPHILS # BLD AUTO: 3.11 K/UL (ref 2–7.15)
NEUTROPHILS NFR BLD: 57.3 % (ref 44–72)
NRBC # BLD AUTO: 0 K/UL
NRBC BLD-RTO: 0 /100 WBC
PLATELET # BLD AUTO: 129 K/UL (ref 164–446)
PMV BLD AUTO: 10.2 FL (ref 9–12.9)
POTASSIUM SERPL-SCNC: 3.7 MMOL/L (ref 3.6–5.5)
PROT SERPL-MCNC: 6.9 G/DL (ref 6–8.2)
RBC # BLD AUTO: 3.28 M/UL (ref 4.2–5.4)
SODIUM SERPL-SCNC: 137 MMOL/L (ref 135–145)
WBC # BLD AUTO: 5.4 K/UL (ref 4.8–10.8)

## 2020-11-13 PROCEDURE — 80053 COMPREHEN METABOLIC PANEL: CPT

## 2020-11-13 PROCEDURE — 85025 COMPLETE CBC W/AUTO DIFF WBC: CPT

## 2020-11-13 PROCEDURE — 83735 ASSAY OF MAGNESIUM: CPT

## 2020-11-13 PROCEDURE — 36415 COLL VENOUS BLD VENIPUNCTURE: CPT

## 2020-11-20 ENCOUNTER — APPOINTMENT (OUTPATIENT)
Dept: CARDIOLOGY | Facility: MEDICAL CENTER | Age: 45
End: 2020-11-20
Attending: INTERNAL MEDICINE
Payer: MEDICAID

## 2020-12-04 ENCOUNTER — HOSPITAL ENCOUNTER (OUTPATIENT)
Dept: LAB | Facility: MEDICAL CENTER | Age: 45
End: 2020-12-04
Attending: INTERNAL MEDICINE
Payer: MEDICAID

## 2020-12-04 LAB
ALBUMIN SERPL BCP-MCNC: 4.2 G/DL (ref 3.2–4.9)
ALBUMIN/GLOB SERPL: 1.5 G/DL
ALP SERPL-CCNC: 90 U/L (ref 30–99)
ALT SERPL-CCNC: 25 U/L (ref 2–50)
ANION GAP SERPL CALC-SCNC: 10 MMOL/L (ref 7–16)
AST SERPL-CCNC: 18 U/L (ref 12–45)
BASOPHILS # BLD AUTO: 0.4 % (ref 0–1.8)
BASOPHILS # BLD: 0.02 K/UL (ref 0–0.12)
BILIRUB SERPL-MCNC: 0.4 MG/DL (ref 0.1–1.5)
BUN SERPL-MCNC: 10 MG/DL (ref 8–22)
CALCIUM SERPL-MCNC: 9.5 MG/DL (ref 8.5–10.5)
CHLORIDE SERPL-SCNC: 102 MMOL/L (ref 96–112)
CO2 SERPL-SCNC: 25 MMOL/L (ref 20–33)
CREAT SERPL-MCNC: 0.59 MG/DL (ref 0.5–1.4)
EOSINOPHIL # BLD AUTO: 0.01 K/UL (ref 0–0.51)
EOSINOPHIL NFR BLD: 0.2 % (ref 0–6.9)
ERYTHROCYTE [DISTWIDTH] IN BLOOD BY AUTOMATED COUNT: 64 FL (ref 35.9–50)
FASTING STATUS PATIENT QL REPORTED: NORMAL
GLOBULIN SER CALC-MCNC: 2.8 G/DL (ref 1.9–3.5)
GLUCOSE SERPL-MCNC: 122 MG/DL (ref 65–99)
HCT VFR BLD AUTO: 31.6 % (ref 37–47)
HGB BLD-MCNC: 10.7 G/DL (ref 12–16)
IMM GRANULOCYTES # BLD AUTO: 0.01 K/UL (ref 0–0.11)
IMM GRANULOCYTES NFR BLD AUTO: 0.2 % (ref 0–0.9)
LYMPHOCYTES # BLD AUTO: 1.48 K/UL (ref 1–4.8)
LYMPHOCYTES NFR BLD: 27.9 % (ref 22–41)
MAGNESIUM SERPL-MCNC: 1.4 MG/DL (ref 1.5–2.5)
MCH RBC QN AUTO: 34.1 PG (ref 27–33)
MCHC RBC AUTO-ENTMCNC: 33.9 G/DL (ref 33.6–35)
MCV RBC AUTO: 100.6 FL (ref 81.4–97.8)
MONOCYTES # BLD AUTO: 0.44 K/UL (ref 0–0.85)
MONOCYTES NFR BLD AUTO: 8.3 % (ref 0–13.4)
NEUTROPHILS # BLD AUTO: 3.34 K/UL (ref 2–7.15)
NEUTROPHILS NFR BLD: 63 % (ref 44–72)
NRBC # BLD AUTO: 0 K/UL
NRBC BLD-RTO: 0 /100 WBC
PLATELET # BLD AUTO: 124 K/UL (ref 164–446)
PMV BLD AUTO: 10.3 FL (ref 9–12.9)
POTASSIUM SERPL-SCNC: 3.3 MMOL/L (ref 3.6–5.5)
PROT SERPL-MCNC: 7 G/DL (ref 6–8.2)
RBC # BLD AUTO: 3.14 M/UL (ref 4.2–5.4)
SODIUM SERPL-SCNC: 137 MMOL/L (ref 135–145)
WBC # BLD AUTO: 5.3 K/UL (ref 4.8–10.8)

## 2020-12-04 PROCEDURE — 83735 ASSAY OF MAGNESIUM: CPT

## 2020-12-04 PROCEDURE — 85025 COMPLETE CBC W/AUTO DIFF WBC: CPT

## 2020-12-04 PROCEDURE — 36415 COLL VENOUS BLD VENIPUNCTURE: CPT

## 2020-12-04 PROCEDURE — 80053 COMPREHEN METABOLIC PANEL: CPT

## 2020-12-22 ENCOUNTER — HOSPITAL ENCOUNTER (OUTPATIENT)
Dept: CARDIOLOGY | Facility: MEDICAL CENTER | Age: 45
End: 2020-12-22
Attending: INTERNAL MEDICINE
Payer: MEDICAID

## 2020-12-22 DIAGNOSIS — C50.111 MALIGNANT NEOPLASM OF CENTRAL PORTION OF RIGHT FEMALE BREAST, UNSPECIFIED ESTROGEN RECEPTOR STATUS (HCC): ICD-10-CM

## 2020-12-22 LAB
LV EJECT FRACT  99904: 55
LV EJECT FRACT MOD 2C 99903: 49.15
LV EJECT FRACT MOD 4C 99902: 55
LV EJECT FRACT MOD BP 99901: 57.69

## 2020-12-22 PROCEDURE — 93306 TTE W/DOPPLER COMPLETE: CPT | Mod: 26 | Performed by: INTERNAL MEDICINE

## 2020-12-22 PROCEDURE — 93306 TTE W/DOPPLER COMPLETE: CPT

## 2020-12-28 ENCOUNTER — HOSPITAL ENCOUNTER (OUTPATIENT)
Dept: LAB | Facility: MEDICAL CENTER | Age: 45
End: 2020-12-28
Attending: INTERNAL MEDICINE
Payer: MEDICAID

## 2020-12-28 LAB
ALBUMIN SERPL BCP-MCNC: 4.2 G/DL (ref 3.2–4.9)
ALBUMIN/GLOB SERPL: 1.6 G/DL
ALP SERPL-CCNC: 59 U/L (ref 30–99)
ALT SERPL-CCNC: 26 U/L (ref 2–50)
ANION GAP SERPL CALC-SCNC: 9 MMOL/L (ref 7–16)
ANISOCYTOSIS BLD QL SMEAR: ABNORMAL
AST SERPL-CCNC: 21 U/L (ref 12–45)
BASOPHILS # BLD AUTO: 0 % (ref 0–1.8)
BASOPHILS # BLD: 0 K/UL (ref 0–0.12)
BILIRUB SERPL-MCNC: 0.3 MG/DL (ref 0.1–1.5)
BUN SERPL-MCNC: 5 MG/DL (ref 8–22)
CALCIUM SERPL-MCNC: 9.9 MG/DL (ref 8.5–10.5)
CHLORIDE SERPL-SCNC: 102 MMOL/L (ref 96–112)
CO2 SERPL-SCNC: 26 MMOL/L (ref 20–33)
CREAT SERPL-MCNC: 0.55 MG/DL (ref 0.5–1.4)
EOSINOPHIL # BLD AUTO: 0 K/UL (ref 0–0.51)
EOSINOPHIL NFR BLD: 0 % (ref 0–6.9)
ERYTHROCYTE [DISTWIDTH] IN BLOOD BY AUTOMATED COUNT: 78.1 FL (ref 35.9–50)
GLOBULIN SER CALC-MCNC: 2.7 G/DL (ref 1.9–3.5)
GLUCOSE SERPL-MCNC: 95 MG/DL (ref 65–99)
HCT VFR BLD AUTO: 33 % (ref 37–47)
HGB BLD-MCNC: 10.7 G/DL (ref 12–16)
LYMPHOCYTES # BLD AUTO: 1.54 K/UL (ref 1–4.8)
LYMPHOCYTES NFR BLD: 35.7 % (ref 22–41)
MACROCYTES BLD QL SMEAR: ABNORMAL
MAGNESIUM SERPL-MCNC: 1.6 MG/DL (ref 1.5–2.5)
MANUAL DIFF BLD: NORMAL
MCH RBC QN AUTO: 36 PG (ref 27–33)
MCHC RBC AUTO-ENTMCNC: 32.4 G/DL (ref 33.6–35)
MCV RBC AUTO: 111.1 FL (ref 81.4–97.8)
MICROCYTES BLD QL SMEAR: ABNORMAL
MONOCYTES # BLD AUTO: 0.18 K/UL (ref 0–0.85)
MONOCYTES NFR BLD AUTO: 4.3 % (ref 0–13.4)
MORPHOLOGY BLD-IMP: NORMAL
NEUTROPHILS # BLD AUTO: 2.58 K/UL (ref 2–7.15)
NEUTROPHILS NFR BLD: 60 % (ref 44–72)
NRBC # BLD AUTO: 0 K/UL
NRBC BLD-RTO: 0 /100 WBC
OVALOCYTES BLD QL SMEAR: NORMAL
PLATELET # BLD AUTO: 236 K/UL (ref 164–446)
PLATELET BLD QL SMEAR: NORMAL
PMV BLD AUTO: 10.5 FL (ref 9–12.9)
POLYCHROMASIA BLD QL SMEAR: NORMAL
POTASSIUM SERPL-SCNC: 4 MMOL/L (ref 3.6–5.5)
PROT SERPL-MCNC: 6.9 G/DL (ref 6–8.2)
RBC # BLD AUTO: 2.97 M/UL (ref 4.2–5.4)
RBC BLD AUTO: PRESENT
SODIUM SERPL-SCNC: 137 MMOL/L (ref 135–145)
WBC # BLD AUTO: 4.3 K/UL (ref 4.8–10.8)

## 2020-12-28 PROCEDURE — 85007 BL SMEAR W/DIFF WBC COUNT: CPT

## 2020-12-28 PROCEDURE — 80053 COMPREHEN METABOLIC PANEL: CPT

## 2020-12-28 PROCEDURE — 85027 COMPLETE CBC AUTOMATED: CPT

## 2020-12-28 PROCEDURE — 36415 COLL VENOUS BLD VENIPUNCTURE: CPT

## 2020-12-28 PROCEDURE — 83735 ASSAY OF MAGNESIUM: CPT

## 2020-12-29 DIAGNOSIS — C50.811 MALIGNANT NEOPLASM OF OVERLAPPING SITES OF RIGHT FEMALE BREAST, UNSPECIFIED ESTROGEN RECEPTOR STATUS (HCC): ICD-10-CM

## 2020-12-29 NOTE — CT SIMULATION
PATIENT NAME Deisy Galo   PRIMARY PHYSICIAN Pcp Pt States None [id number]   REFERRING PHYSICIAN No ref. provider found 1975     Malignant neoplasm of overlapping sites of right breast in female, estrogen receptor positive (HCC)  Staging form: Breast, AJCC 8th Edition  - Pathologic: Stage IB (pT2, pN1a, cM0, G3, ER+, [place]+, HER2+) - Signed by Juana Perrin M.D. on 8/25/2020  Stage prefix: Initial diagnosis  Method of lymph node assessment: Axillary lymph node dissection  Histologic grading system: 3 grade system      Treatment Planning CT Simulation    No orders found for this encounter

## 2021-01-04 ENCOUNTER — HOSPITAL ENCOUNTER (OUTPATIENT)
Dept: RADIATION ONCOLOGY | Facility: MEDICAL CENTER | Age: 46
End: 2021-01-31
Attending: RADIOLOGY
Payer: MEDICAID

## 2021-01-13 ENCOUNTER — APPOINTMENT (OUTPATIENT)
Dept: RADIOLOGY | Facility: MEDICAL CENTER | Age: 46
End: 2021-01-13
Attending: INTERNAL MEDICINE
Payer: MEDICAID

## 2021-01-18 ENCOUNTER — HOSPITAL ENCOUNTER (OUTPATIENT)
Dept: RADIATION ONCOLOGY | Facility: MEDICAL CENTER | Age: 46
End: 2021-01-18
Payer: MEDICAID

## 2021-01-18 PROCEDURE — 77334 RADIATION TREATMENT AID(S): CPT | Mod: 26 | Performed by: RADIOLOGY

## 2021-01-18 PROCEDURE — 77470 SPECIAL RADIATION TREATMENT: CPT | Mod: 26 | Performed by: RADIOLOGY

## 2021-01-18 PROCEDURE — 77290 THER RAD SIMULAJ FIELD CPLX: CPT | Performed by: RADIOLOGY

## 2021-01-18 PROCEDURE — 77290 THER RAD SIMULAJ FIELD CPLX: CPT | Mod: 26 | Performed by: RADIOLOGY

## 2021-01-18 PROCEDURE — 77470 SPECIAL RADIATION TREATMENT: CPT | Performed by: RADIOLOGY

## 2021-01-18 PROCEDURE — 77263 THER RADIOLOGY TX PLNG CPLX: CPT | Performed by: RADIOLOGY

## 2021-01-18 PROCEDURE — 77334 RADIATION TREATMENT AID(S): CPT | Performed by: RADIOLOGY

## 2021-01-19 ENCOUNTER — HOSPITAL ENCOUNTER (OUTPATIENT)
Dept: LAB | Facility: MEDICAL CENTER | Age: 46
End: 2021-01-19
Attending: NURSE PRACTITIONER
Payer: MEDICAID

## 2021-01-19 PROCEDURE — 80053 COMPREHEN METABOLIC PANEL: CPT

## 2021-01-20 ENCOUNTER — HOSPITAL ENCOUNTER (OUTPATIENT)
Dept: RADIOLOGY | Facility: MEDICAL CENTER | Age: 46
End: 2021-01-20
Attending: INTERNAL MEDICINE
Payer: MEDICAID

## 2021-01-20 DIAGNOSIS — C50.111 MALIGNANT NEOPLASM OF CENTRAL PORTION OF RIGHT FEMALE BREAST, UNSPECIFIED ESTROGEN RECEPTOR STATUS (HCC): ICD-10-CM

## 2021-01-20 LAB
ALBUMIN SERPL BCP-MCNC: 4.1 G/DL (ref 3.2–4.9)
ALBUMIN/GLOB SERPL: 1.9 G/DL
ALP SERPL-CCNC: 61 U/L (ref 30–99)
ALT SERPL-CCNC: 47 U/L (ref 2–50)
ANION GAP SERPL CALC-SCNC: 10 MMOL/L (ref 7–16)
AST SERPL-CCNC: 37 U/L (ref 12–45)
BILIRUB SERPL-MCNC: 0.4 MG/DL (ref 0.1–1.5)
BUN SERPL-MCNC: 7 MG/DL (ref 8–22)
CALCIUM SERPL-MCNC: 9.6 MG/DL (ref 8.5–10.5)
CHLORIDE SERPL-SCNC: 103 MMOL/L (ref 96–112)
CO2 SERPL-SCNC: 27 MMOL/L (ref 20–33)
CREAT SERPL-MCNC: 0.49 MG/DL (ref 0.5–1.4)
GLOBULIN SER CALC-MCNC: 2.2 G/DL (ref 1.9–3.5)
GLUCOSE SERPL-MCNC: 93 MG/DL (ref 65–99)
POTASSIUM SERPL-SCNC: 3.9 MMOL/L (ref 3.6–5.5)
PROT SERPL-MCNC: 6.3 G/DL (ref 6–8.2)
SODIUM SERPL-SCNC: 140 MMOL/L (ref 135–145)

## 2021-01-20 PROCEDURE — G0279 TOMOSYNTHESIS, MAMMO: HCPCS | Mod: RT

## 2021-01-22 PROCEDURE — 77334 RADIATION TREATMENT AID(S): CPT | Mod: 26 | Performed by: RADIOLOGY

## 2021-01-22 PROCEDURE — 77295 3-D RADIOTHERAPY PLAN: CPT | Mod: 26 | Performed by: RADIOLOGY

## 2021-01-22 PROCEDURE — 77295 3-D RADIOTHERAPY PLAN: CPT | Performed by: RADIOLOGY

## 2021-01-22 PROCEDURE — 77300 RADIATION THERAPY DOSE PLAN: CPT | Mod: 26 | Performed by: RADIOLOGY

## 2021-01-22 PROCEDURE — 77334 RADIATION TREATMENT AID(S): CPT | Performed by: RADIOLOGY

## 2021-01-22 PROCEDURE — 77300 RADIATION THERAPY DOSE PLAN: CPT | Performed by: RADIOLOGY

## 2021-01-25 ENCOUNTER — HOSPITAL ENCOUNTER (OUTPATIENT)
Dept: RADIATION ONCOLOGY | Facility: MEDICAL CENTER | Age: 46
End: 2021-01-25
Payer: MEDICAID

## 2021-01-25 LAB

## 2021-01-25 PROCEDURE — 77280 THER RAD SIMULAJ FIELD SMPL: CPT | Mod: 26 | Performed by: RADIOLOGY

## 2021-01-25 PROCEDURE — 77412 RADIATION TX DELIVERY LVL 3: CPT | Performed by: RADIOLOGY

## 2021-01-25 PROCEDURE — 77280 THER RAD SIMULAJ FIELD SMPL: CPT | Performed by: RADIOLOGY

## 2021-01-26 ENCOUNTER — HOSPITAL ENCOUNTER (OUTPATIENT)
Dept: RADIATION ONCOLOGY | Facility: MEDICAL CENTER | Age: 46
End: 2021-01-26
Payer: MEDICAID

## 2021-01-26 LAB

## 2021-01-26 PROCEDURE — 77412 RADIATION TX DELIVERY LVL 3: CPT | Performed by: RADIOLOGY

## 2021-01-27 ENCOUNTER — HOSPITAL ENCOUNTER (OUTPATIENT)
Dept: RADIATION ONCOLOGY | Facility: MEDICAL CENTER | Age: 46
End: 2021-01-27
Payer: MEDICAID

## 2021-01-27 VITALS
SYSTOLIC BLOOD PRESSURE: 111 MMHG | OXYGEN SATURATION: 96 % | HEART RATE: 87 BPM | DIASTOLIC BLOOD PRESSURE: 66 MMHG | BODY MASS INDEX: 26.52 KG/M2 | TEMPERATURE: 98.6 F | WEIGHT: 159.39 LBS

## 2021-01-27 LAB

## 2021-01-27 PROCEDURE — 77412 RADIATION TX DELIVERY LVL 3: CPT | Performed by: RADIOLOGY

## 2021-01-27 ASSESSMENT — FIBROSIS 4 INDEX: FIB4 SCORE: 0.98

## 2021-01-27 ASSESSMENT — PAIN SCALES - GENERAL: PAINLEVEL: NO PAIN

## 2021-01-27 NOTE — ON TREATMENT VISIT
ON TREATMENT NOTE  RADIATION ONCOLOGY DEPARTMENT    Patient name:  Deisy Galo    Primary Physician:  Pcp Pt States None MRN: [id number]  CSN: [id number]   Referring physician:  Aspen Woodruff M.D. : 1975, 45 y.o.     ENCOUNTER DATE:  21    DIAGNOSIS:    Malignant neoplasm of overlapping sites of right breast in female, estrogen receptor positive (HCC)  Staging form: Breast, AJCC 8th Edition  - Pathologic: Stage IB (pT2, pN1a, cM0, G3, ER+, [place]+, HER2+) - Signed by Juana Perrin M.D. on 2020  Stage prefix: Initial diagnosis  Method of lymph node assessment: Axillary lymph node dissection  Histologic grading system: 3 grade system      TREATMENT SUMMARY:  Radiation Treatments     Active   Plans   R_Breast   Most recent treatment: Dose planned: 180 cGy (fraction 3 of 28 on 2021)   Total: Dose planned: 5,040 cGy   Elapsed Days: 2 @ [id number]      R_SCV   Most recent treatment: Dose planned: 180 cGy (fraction 3 of 28 on 2021)   Total: Dose planned: 5,040 cGy   Elapsed Days: 2 @ [id number]      Reference Points   R_Breast   Most recent treatment: Dose given: 180 cGy (on 2021)   Total: Dose given: 540 cGy   Elapsed Days: 2 @ [id number]      R_Breast CP2   Most recent treatment: Dose given: 180 cGy (on 2021)   Total: Dose given: 540 cGy   Elapsed Days: 2 @ [id number]      R_SCV   Most recent treatment: Dose given: 180 cGy (on 2021)   Total: Dose given: 540 cGy   Elapsed Days: 2 @ [id number]      R_SCV CP   Most recent treatment: Dose given: 180 cGy (on 2021)   Total: Dose given: 540 cGy   Elapsed Days: 2 @ [id number]                    SUBJECTIVE:   Slight tingling in the field this morning      VITAL SIGNS:  /66 (BP Location: Left arm, Patient Position: Sitting, BP Cuff Size: Adult)   Pulse 87   Temp 37 °C (98.6 °F) (Temporal)   Wt 72.3 kg (159 lb 6.3 oz)   SpO2 96%    Encounter Vitals  Temperature: 37 °C (98.6 °F)  Temp src:  Temporal  Blood Pressure: 111/66  Pulse: 87  Pulse Oximetry: 96 %  Weight: 72.3 kg (159 lb 6.3 oz)  Pain Score: No pain  Pain Assessment 1/27/2021 8/25/2020 8/25/2020   Pain Assessment Denies Pain Denies Pain -   Pain Score 0 0 0   Some recent data might be hidden          PHYSICAL EXAM:    No erythema    Toxicity Assessment 1/27/2021   Toxicity Assessment Breast   Fatigue (lethargy, malaise, asthenia) None   Fever (in the absence of neutropenia) None   Radiation Dermatitis None   Lymphatics Normal   RT - Pain due to RT None   Dyspnea Normal         IMPRESSION:  Cancer Staging  Malignant neoplasm of overlapping sites of right breast in female, estrogen receptor positive (HCC)  Staging form: Breast, AJCC 8th Edition  - Pathologic: Stage IB (pT2, pN1a, cM0, G3, ER+, [place]+, HER2+) - Signed by Juana Perrin M.D. on 8/25/2020      PLAN:  No change in treatment plan    Disposition:  Treatment plan reviewed. Questions answered. Continue therapy outlined.     Juana Perrin M.D.    No orders of the defined types were placed in this encounter.

## 2021-01-28 ENCOUNTER — HOSPITAL ENCOUNTER (OUTPATIENT)
Dept: RADIATION ONCOLOGY | Facility: MEDICAL CENTER | Age: 46
End: 2021-01-28
Payer: MEDICAID

## 2021-01-28 LAB

## 2021-01-28 PROCEDURE — 77412 RADIATION TX DELIVERY LVL 3: CPT | Performed by: RADIOLOGY

## 2021-01-29 ENCOUNTER — HOSPITAL ENCOUNTER (OUTPATIENT)
Dept: RADIATION ONCOLOGY | Facility: MEDICAL CENTER | Age: 46
End: 2021-01-29
Payer: MEDICAID

## 2021-01-29 LAB

## 2021-01-29 PROCEDURE — 77427 RADIATION TX MANAGEMENT X5: CPT | Performed by: RADIOLOGY

## 2021-01-29 PROCEDURE — 77336 RADIATION PHYSICS CONSULT: CPT | Performed by: RADIOLOGY

## 2021-01-29 PROCEDURE — 77412 RADIATION TX DELIVERY LVL 3: CPT | Performed by: RADIOLOGY

## 2021-02-01 ENCOUNTER — HOSPITAL ENCOUNTER (OUTPATIENT)
Dept: RADIATION ONCOLOGY | Facility: MEDICAL CENTER | Age: 46
End: 2021-02-28
Attending: RADIOLOGY
Payer: MEDICAID

## 2021-02-01 ENCOUNTER — HOSPITAL ENCOUNTER (OUTPATIENT)
Dept: RADIATION ONCOLOGY | Facility: MEDICAL CENTER | Age: 46
End: 2021-02-01
Payer: MEDICAID

## 2021-02-01 LAB

## 2021-02-01 PROCEDURE — 77417 THER RADIOLOGY PORT IMAGE(S): CPT | Performed by: RADIOLOGY

## 2021-02-01 PROCEDURE — 77412 RADIATION TX DELIVERY LVL 3: CPT | Performed by: RADIOLOGY

## 2021-02-02 ENCOUNTER — HOSPITAL ENCOUNTER (OUTPATIENT)
Dept: RADIATION ONCOLOGY | Facility: MEDICAL CENTER | Age: 46
End: 2021-02-02
Payer: MEDICAID

## 2021-02-02 LAB

## 2021-02-02 PROCEDURE — 77412 RADIATION TX DELIVERY LVL 3: CPT | Performed by: RADIOLOGY

## 2021-02-03 ENCOUNTER — HOSPITAL ENCOUNTER (OUTPATIENT)
Dept: RADIATION ONCOLOGY | Facility: MEDICAL CENTER | Age: 46
End: 2021-02-03
Payer: MEDICAID

## 2021-02-03 VITALS
DIASTOLIC BLOOD PRESSURE: 78 MMHG | WEIGHT: 156.31 LBS | BODY MASS INDEX: 26.01 KG/M2 | HEART RATE: 82 BPM | TEMPERATURE: 98 F | OXYGEN SATURATION: 97 % | SYSTOLIC BLOOD PRESSURE: 118 MMHG

## 2021-02-03 LAB

## 2021-02-03 PROCEDURE — 77412 RADIATION TX DELIVERY LVL 3: CPT | Performed by: RADIOLOGY

## 2021-02-03 ASSESSMENT — PAIN SCALES - GENERAL: PAINLEVEL: NO PAIN

## 2021-02-03 ASSESSMENT — FIBROSIS 4 INDEX: FIB4 SCORE: 0.98

## 2021-02-03 NOTE — ON TREATMENT VISIT
ON TREATMENT NOTE  RADIATION ONCOLOGY DEPARTMENT    Patient name:  Deisy Galo    Primary Physician:  Pcp Pt States None MRN: [id number]  CSN: [id number]   Referring physician:  Aspen Woodruff M.D. : 1975, 45 y.o.     ENCOUNTER DATE:  21    DIAGNOSIS:    Malignant neoplasm of overlapping sites of right breast in female, estrogen receptor positive (HCC)  Staging form: Breast, AJCC 8th Edition  - Pathologic: Stage IB (pT2, pN1a, cM0, G3, ER+, [place]+, HER2+) - Signed by Juana Perrin M.D. on 2020  Stage prefix: Initial diagnosis  Method of lymph node assessment: Axillary lymph node dissection  Histologic grading system: 3 grade system      TREATMENT SUMMARY:  Radiation Treatments     Active   Plans   R_Breast   Most recent treatment: Dose planned: 180 cGy (fraction 8 of 28 on 2/3/2021)   Total: Dose planned: 5,040 cGy   Elapsed Days: 9 @ [id number]      R_SCV   Most recent treatment: Dose planned: 180 cGy (fraction 8 of 28 on 2/3/2021)   Total: Dose planned: 5,040 cGy   Elapsed Days: 9 @ [id number]      Reference Points   R_Breast   Most recent treatment: Dose given: 180 cGy (on 2/3/2021)   Total: Dose given: 1,440 cGy   Elapsed Days: 9 @ [id number]      R_Breast CP2   Most recent treatment: Dose given: 180 cGy (on 2/3/2021)   Total: Dose given: 1,440 cGy   Elapsed Days: 9 @ [id number]      R_SCV   Most recent treatment: Dose given: 180 cGy (on 2/3/2021)   Total: Dose given: 1,440 cGy   Elapsed Days: 9 @ [id number]      R_SCV CP   Most recent treatment: Dose given: 180 cGy (on 2/3/2021)   Total: Dose given: 1,440 cGy   Elapsed Days: 9 @ [id number]                    SUBJECTIVE:   Doing well no complaints.  Still tired from chemo      VITAL SIGNS:  /78 (BP Location: Left arm, Patient Position: Sitting, BP Cuff Size: Adult)   Pulse 82   Temp 36.7 °C (98 °F) (Temporal)   Wt 70.9 kg (156 lb 4.9 oz)   SpO2 97%    Encounter Vitals  Temperature: 36.7 °C (98 °F)  Temp src:  Temporal  Blood Pressure: 118/78  Pulse: 82  Pulse Oximetry: 97 %  Weight: 70.9 kg (156 lb 4.9 oz)  Pain Score: No pain  Pain Assessment 2/3/2021 1/27/2021 8/25/2020 8/25/2020   Pain Assessment Denies Pain Denies Pain Denies Pain -   Pain Score 0 0 0 0   Some recent data might be hidden          PHYSICAL EXAM:    No erythema    Toxicity Assessment 2/3/2021 1/27/2021   Toxicity Assessment Breast Breast   Fatigue (lethargy, malaise, asthenia) Increased fatigue over baseline, but not altering normal activities None   Fever (in the absence of neutropenia) None None   Radiation Dermatitis None None   Lymphatics Normal Normal   RT - Pain due to RT None None   Dyspnea Normal Normal         IMPRESSION:  Cancer Staging  Malignant neoplasm of overlapping sites of right breast in female, estrogen receptor positive (HCC)  Staging form: Breast, AJCC 8th Edition  - Pathologic: Stage IB (pT2, pN1a, cM0, G3, ER+, [place]+, HER2+) - Signed by Juana Perrin M.D. on 8/25/2020      PLAN:  No change in treatment plan    Disposition:  Treatment plan reviewed. Questions answered. Continue therapy outlined.     Juana Perrin M.D.    No orders of the defined types were placed in this encounter.

## 2021-02-04 LAB

## 2021-02-04 PROCEDURE — 77412 RADIATION TX DELIVERY LVL 3: CPT | Performed by: RADIOLOGY

## 2021-02-05 LAB

## 2021-02-05 PROCEDURE — 77336 RADIATION PHYSICS CONSULT: CPT | Performed by: RADIOLOGY

## 2021-02-05 PROCEDURE — 77427 RADIATION TX MANAGEMENT X5: CPT | Performed by: RADIOLOGY

## 2021-02-05 PROCEDURE — 77412 RADIATION TX DELIVERY LVL 3: CPT | Performed by: RADIOLOGY

## 2021-02-08 ENCOUNTER — HOSPITAL ENCOUNTER (OUTPATIENT)
Dept: LAB | Facility: MEDICAL CENTER | Age: 46
End: 2021-02-08
Attending: INTERNAL MEDICINE
Payer: MEDICAID

## 2021-02-08 LAB
BASOPHILS # BLD AUTO: 0.9 % (ref 0–1.8)
BASOPHILS # BLD: 0.04 K/UL (ref 0–0.12)
CHEMOTHERAPY INFUSION START DATE: NORMAL
CHEMOTHERAPY RECORDS: 1.8
CHEMOTHERAPY RECORDS: 1.8
CHEMOTHERAPY RECORDS: 5040
CHEMOTHERAPY RECORDS: 5040
CHEMOTHERAPY RECORDS: NORMAL
CHEMOTHERAPY RX CANCER: NORMAL
DATE 1ST CHEMO CANCER: NORMAL
EOSINOPHIL # BLD AUTO: 0.25 K/UL (ref 0–0.51)
EOSINOPHIL NFR BLD: 5.3 % (ref 0–6.9)
ERYTHROCYTE [DISTWIDTH] IN BLOOD BY AUTOMATED COUNT: 50.7 FL (ref 35.9–50)
HCT VFR BLD AUTO: 38.8 % (ref 37–47)
HGB BLD-MCNC: 12.9 G/DL (ref 12–16)
IMM GRANULOCYTES # BLD AUTO: 0.01 K/UL (ref 0–0.11)
IMM GRANULOCYTES NFR BLD AUTO: 0.2 % (ref 0–0.9)
LYMPHOCYTES # BLD AUTO: 1.38 K/UL (ref 1–4.8)
LYMPHOCYTES NFR BLD: 29.4 % (ref 22–41)
MCH RBC QN AUTO: 35.9 PG (ref 27–33)
MCHC RBC AUTO-ENTMCNC: 33.2 G/DL (ref 33.6–35)
MCV RBC AUTO: 108.1 FL (ref 81.4–97.8)
MONOCYTES # BLD AUTO: 0.58 K/UL (ref 0–0.85)
MONOCYTES NFR BLD AUTO: 12.3 % (ref 0–13.4)
NEUTROPHILS # BLD AUTO: 2.44 K/UL (ref 2–7.15)
NEUTROPHILS NFR BLD: 51.9 % (ref 44–72)
NRBC # BLD AUTO: 0 K/UL
NRBC BLD-RTO: 0 /100 WBC
PLATELET # BLD AUTO: 336 K/UL (ref 164–446)
PMV BLD AUTO: 10.5 FL (ref 9–12.9)
RAD ONC ARIA COURSE LAST TREATMENT DATE: NORMAL
RAD ONC ARIA COURSE TREATMENT ELAPSED DAYS: NORMAL
RAD ONC ARIA REFERENCE POINT DOSAGE GIVEN TO DATE: 19.8
RAD ONC ARIA REFERENCE POINT ID: NORMAL
RAD ONC ARIA REFERENCE POINT SESSION DOSAGE GIVEN: 1.8
RBC # BLD AUTO: 3.59 M/UL (ref 4.2–5.4)
WBC # BLD AUTO: 4.7 K/UL (ref 4.8–10.8)

## 2021-02-08 PROCEDURE — 36415 COLL VENOUS BLD VENIPUNCTURE: CPT

## 2021-02-08 PROCEDURE — 77417 THER RADIOLOGY PORT IMAGE(S): CPT | Performed by: RADIOLOGY

## 2021-02-08 PROCEDURE — 80053 COMPREHEN METABOLIC PANEL: CPT

## 2021-02-08 PROCEDURE — 85025 COMPLETE CBC W/AUTO DIFF WBC: CPT

## 2021-02-08 PROCEDURE — 77412 RADIATION TX DELIVERY LVL 3: CPT | Performed by: RADIOLOGY

## 2021-02-08 PROCEDURE — 83735 ASSAY OF MAGNESIUM: CPT

## 2021-02-09 LAB
ALBUMIN SERPL BCP-MCNC: 4.3 G/DL (ref 3.2–4.9)
ALBUMIN/GLOB SERPL: 1.5 G/DL
ALP SERPL-CCNC: 63 U/L (ref 30–99)
ALT SERPL-CCNC: 26 U/L (ref 2–50)
ANION GAP SERPL CALC-SCNC: 17 MMOL/L (ref 7–16)
AST SERPL-CCNC: 25 U/L (ref 12–45)
BILIRUB SERPL-MCNC: 0.4 MG/DL (ref 0.1–1.5)
BUN SERPL-MCNC: 7 MG/DL (ref 8–22)
CALCIUM SERPL-MCNC: 9.8 MG/DL (ref 8.5–10.5)
CHEMOTHERAPY INFUSION START DATE: NORMAL
CHEMOTHERAPY RECORDS: 1.8
CHEMOTHERAPY RECORDS: 1.8
CHEMOTHERAPY RECORDS: 5040
CHEMOTHERAPY RECORDS: 5040
CHEMOTHERAPY RECORDS: NORMAL
CHEMOTHERAPY RX CANCER: NORMAL
CHLORIDE SERPL-SCNC: 99 MMOL/L (ref 96–112)
CO2 SERPL-SCNC: 22 MMOL/L (ref 20–33)
CREAT SERPL-MCNC: 0.59 MG/DL (ref 0.5–1.4)
DATE 1ST CHEMO CANCER: NORMAL
GLOBULIN SER CALC-MCNC: 2.8 G/DL (ref 1.9–3.5)
GLUCOSE SERPL-MCNC: 102 MG/DL (ref 65–99)
MAGNESIUM SERPL-MCNC: 1.4 MG/DL (ref 1.5–2.5)
POTASSIUM SERPL-SCNC: 3.7 MMOL/L (ref 3.6–5.5)
PROT SERPL-MCNC: 7.1 G/DL (ref 6–8.2)
RAD ONC ARIA COURSE LAST TREATMENT DATE: NORMAL
RAD ONC ARIA COURSE TREATMENT ELAPSED DAYS: NORMAL
RAD ONC ARIA REFERENCE POINT DOSAGE GIVEN TO DATE: 21.6
RAD ONC ARIA REFERENCE POINT ID: NORMAL
RAD ONC ARIA REFERENCE POINT SESSION DOSAGE GIVEN: 1.8
SODIUM SERPL-SCNC: 138 MMOL/L (ref 135–145)

## 2021-02-09 PROCEDURE — 77412 RADIATION TX DELIVERY LVL 3: CPT | Performed by: RADIOLOGY

## 2021-02-10 VITALS
OXYGEN SATURATION: 96 % | DIASTOLIC BLOOD PRESSURE: 53 MMHG | TEMPERATURE: 97.6 F | SYSTOLIC BLOOD PRESSURE: 103 MMHG | HEART RATE: 76 BPM | BODY MASS INDEX: 26.19 KG/M2 | WEIGHT: 157.41 LBS

## 2021-02-10 LAB

## 2021-02-10 PROCEDURE — 77412 RADIATION TX DELIVERY LVL 3: CPT | Performed by: RADIOLOGY

## 2021-02-10 ASSESSMENT — FIBROSIS 4 INDEX: FIB4 SCORE: 0.63

## 2021-02-10 ASSESSMENT — PAIN SCALES - GENERAL: PAINLEVEL: NO PAIN

## 2021-02-10 NOTE — ON TREATMENT VISIT
ON TREATMENT NOTE  RADIATION ONCOLOGY DEPARTMENT    Patient name:  Deisy Galo    Primary Physician:  Pcp Pt States None MRN: [id number]  CSN: [id number]   Referring physician:  Aspen Woodruff M.D. : 1975, 45 y.o.     ENCOUNTER DATE:  02/10/21    DIAGNOSIS:    Malignant neoplasm of overlapping sites of right breast in female, estrogen receptor positive (HCC)  Staging form: Breast, AJCC 8th Edition  - Pathologic: Stage IB (pT2, pN1a, cM0, G3, ER+, [place]+, HER2+) - Signed by Juana Perrin M.D. on 2020  Stage prefix: Initial diagnosis  Method of lymph node assessment: Axillary lymph node dissection  Histologic grading system: 3 grade system      TREATMENT SUMMARY:  Radiation Treatments     Active   Plans   R_Breast   Most recent treatment: Dose planned: 180 cGy (fraction 13 of 28 on 2/10/2021)   Total: Dose planned: 5,040 cGy   Elapsed Days: 16 @ [id number]      R_SCV   Most recent treatment: Dose planned: 180 cGy (fraction 13 of 28 on 2/10/2021)   Total: Dose planned: 5,040 cGy   Elapsed Days: 16 @ [id number]      Reference Points   R_Breast   Most recent treatment: Dose given: 180 cGy (on 2/10/2021)   Total: Dose given: 2,340 cGy   Elapsed Days: 16 @ [id number]      R_Breast CP2   Most recent treatment: Dose given: 180 cGy (on 2/10/2021)   Total: Dose given: 2,340 cGy   Elapsed Days: 16 @ [id number]      R_SCV   Most recent treatment: Dose given: 180 cGy (on 2/10/2021)   Total: Dose given: 2,340 cGy   Elapsed Days: 16 @ [id number]      R_SCV CP   Most recent treatment: Dose given: 180 cGy (on 2/10/2021)   Total: Dose given: 2,340 cGy   Elapsed Days: 16 @ [id number]                    SUBJECTIVE:   Tolerating radiation therapy well.  She is nauseated after she gets treatment but I think it is because she does not have breakfast in the morning      VITAL SIGNS:  /53 (BP Location: Left arm, Patient Position: Sitting, BP Cuff Size: Adult)   Pulse 76   Temp 36.4 °C (97.6 °F)  (Temporal)   Wt 71.4 kg (157 lb 6.5 oz)   SpO2 96%    Encounter Vitals  Temperature: 36.4 °C (97.6 °F)  Temp src: Temporal  Blood Pressure: 103/53  Pulse: 76  Pulse Oximetry: 96 %  Weight: 71.4 kg (157 lb 6.5 oz)  Pain Score: No pain  Pain Assessment 2/10/2021 2/3/2021 1/27/2021   Pain Assessment Denies Pain Denies Pain Denies Pain   Pain Score 0 0 0   Some recent data might be hidden          PHYSICAL EXAM:    No erythema    Toxicity Assessment 2/10/2021 2/3/2021 1/27/2021   Toxicity Assessment Breast Breast Breast   Fatigue (lethargy, malaise, asthenia) Increased fatigue over baseline, but not altering normal activities Increased fatigue over baseline, but not altering normal activities None   Fever (in the absence of neutropenia) None None None   Radiation Dermatitis None None None   Lymphatics Normal Normal Normal   RT - Pain due to RT None None None   Dyspnea Normal Normal Normal         IMPRESSION:  Cancer Staging  Malignant neoplasm of overlapping sites of right breast in female, estrogen receptor positive (HCC)  Staging form: Breast, AJCC 8th Edition  - Pathologic: Stage IB (pT2, pN1a, cM0, G3, ER+, [place]+, HER2+) - Signed by Juana Perrin M.D. on 8/25/2020      PLAN:  No change in treatment plan    Disposition:  Treatment plan reviewed. Questions answered. Continue therapy outlined.     Juana Perrin M.D.    Orders Placed This Encounter   • Rad Onc Aria Session Summary

## 2021-02-11 ENCOUNTER — HOSPITAL ENCOUNTER (OUTPATIENT)
Dept: RADIATION ONCOLOGY | Facility: MEDICAL CENTER | Age: 46
End: 2021-02-11
Payer: MEDICAID

## 2021-02-11 LAB

## 2021-02-11 PROCEDURE — 77412 RADIATION TX DELIVERY LVL 3: CPT | Performed by: RADIOLOGY

## 2021-02-12 ENCOUNTER — HOSPITAL ENCOUNTER (OUTPATIENT)
Dept: RADIATION ONCOLOGY | Facility: MEDICAL CENTER | Age: 46
End: 2021-02-12
Payer: MEDICAID

## 2021-02-12 LAB

## 2021-02-12 PROCEDURE — 77412 RADIATION TX DELIVERY LVL 3: CPT | Performed by: RADIOLOGY

## 2021-02-12 PROCEDURE — 77336 RADIATION PHYSICS CONSULT: CPT | Performed by: RADIOLOGY

## 2021-02-12 PROCEDURE — 77427 RADIATION TX MANAGEMENT X5: CPT | Performed by: RADIOLOGY

## 2021-02-16 ENCOUNTER — OFFICE VISIT (OUTPATIENT)
Dept: URGENT CARE | Facility: CLINIC | Age: 46
End: 2021-02-16
Payer: MEDICAID

## 2021-02-16 ENCOUNTER — HOSPITAL ENCOUNTER (OUTPATIENT)
Dept: RADIATION ONCOLOGY | Facility: MEDICAL CENTER | Age: 46
End: 2021-02-16
Payer: MEDICAID

## 2021-02-16 VITALS
RESPIRATION RATE: 16 BRPM | OXYGEN SATURATION: 95 % | DIASTOLIC BLOOD PRESSURE: 60 MMHG | WEIGHT: 157 LBS | BODY MASS INDEX: 26.16 KG/M2 | TEMPERATURE: 96.9 F | SYSTOLIC BLOOD PRESSURE: 90 MMHG | HEIGHT: 65 IN | HEART RATE: 92 BPM

## 2021-02-16 DIAGNOSIS — B80 PINWORMS: ICD-10-CM

## 2021-02-16 LAB

## 2021-02-16 PROCEDURE — 77412 RADIATION TX DELIVERY LVL 3: CPT | Performed by: RADIOLOGY

## 2021-02-16 PROCEDURE — 77417 THER RADIOLOGY PORT IMAGE(S): CPT | Performed by: RADIOLOGY

## 2021-02-16 PROCEDURE — 99203 OFFICE O/P NEW LOW 30 MIN: CPT | Performed by: FAMILY MEDICINE

## 2021-02-16 RX ORDER — TAMOXIFEN CITRATE 10 MG/1
10 TABLET ORAL DAILY
Status: ON HOLD | COMMUNITY
End: 2022-01-19

## 2021-02-16 RX ORDER — ALBENDAZOLE 200 MG/1
400 TABLET, FILM COATED ORAL ONCE
Qty: 2 TABLET | Refills: 1 | Status: SHIPPED | OUTPATIENT
Start: 2021-02-16 | End: 2021-02-16

## 2021-02-16 ASSESSMENT — ENCOUNTER SYMPTOMS
VOMITING: 0
FEVER: 0
COUGH: 0

## 2021-02-16 ASSESSMENT — FIBROSIS 4 INDEX: FIB4 SCORE: 0.63

## 2021-02-16 NOTE — PROGRESS NOTES
"Subjective:     Deisy Galo is a 45 y.o. female who presents for Other (posisble pinworms.  Daughter has pinworms. )    HPI  Pt presents for evaluation of an acute problem   Patient's daughter with pinworms  Patient asymptomatic, however here today to seek treatment  Daughter was prescribed albendazole, however unable to pick it up due to cost  Was told that medication will require a prior authorization     Review of Systems   Constitutional: Negative for fever.   Respiratory: Negative for cough.    Gastrointestinal: Negative for vomiting.       PMH:  has a past medical history of Anesthesia, Cancer (HCC) (2020), Heart burn (2020), Indigestion, Pain (08/13/2020), and Snoring.  MEDS:   Current Outpatient Medications:   •  tamoxifen (NOLVADEX) 10 MG Tab, Take 10 mg by mouth every day., Disp: , Rfl:   •  albendazole (ALBENZA) 200 MG Tab, Take 2 Tablets by mouth one time for 1 dose., Disp: 2 tablet, Rfl: 1  •  acetaminophen (TYLENOL) 500 MG Tab, Take 500-1,000 mg by mouth every 6 hours as needed., Disp: , Rfl:   •  Ibuprofen 200 MG Cap, Take 200 mg by mouth as needed. Took 3 tabs yesterday  Indications: Inflammation, Disp: , Rfl:   •  docusate sodium (COLACE) 100 MG Cap, Take 100 mg by mouth 2 times a day., Disp: , Rfl:   •  tramadol (ULTRAM) 50 MG Tab, Take 100 mg by mouth every four hours as needed., Disp: , Rfl:   •  famotidine (PEPCID) 20 MG Tab, Take 20 mg by mouth 2 times a day., Disp: , Rfl:   •  Loratadine (CLARITIN) 10 MG Cap, Take  by mouth., Disp: , Rfl:   •  ondansetron (ZOFRAN ODT) 4 MG TABLET DISPERSIBLE, Take 4 mg by mouth every 6 hours as needed for Nausea., Disp: , Rfl:   •  NON SPECIFIED, \"Turkey Tail\", Disp: , Rfl:   •  NON SPECIFIED, \"Emergen C with Probiotics\", Disp: , Rfl:   ALLERGIES:   Allergies   Allergen Reactions   • Latex Rash     Reaction at dentist office     SURGHX:   Past Surgical History:   Procedure Laterality Date   • PARTIAL MASTECTOMY Right 8/20/2020    Procedure: " "MASTECTOMY, PARTIAL- RE-EXCISE FOR MARGINS;  Surgeon: Aspen Woodruff M.D.;  Location: SURGERY SAME DAY [facility];  Service: General   • PARTIAL MASTECTOMY Right 8/14/2020    Procedure: MASTECTOMY, PARTIAL- RE EXCISION FOR MARGINS;  Surgeon: Aspen Woodruff M.D.;  Location: SURGERY [facility];  Service: General   • PARTIAL MASTECTOMY Right 8/7/2020    Procedure: MASTECTOMY, PARTIAL;  Surgeon: Aspen Woodruff M.D.;  Location: SURGERY [facility];  Service: General   • NODE BIOPSY SENTINEL Right 8/7/2020    Procedure: BIOPSY, LYMPH NODE, SENTINEL;  Surgeon: Aspen Woodruff M.D.;  Location: SURGERY [facility];  Service: General   • CATH PLACEMENT Left 8/7/2020    Procedure: INSERTION, CATHETER-PORT PLACEMENT;  Surgeon: Aspen Woodruff M.D.;  Location: SURGERY [facility];  Service: General     SOCHX:  reports that she has never smoked. She has never used smokeless tobacco. She reports previous alcohol use. She reports that she does not use drugs.     Objective:   BP (!) 90/60   Pulse 92   Temp 36.1 °C (96.9 °F) (Temporal)   Resp 16   Ht 1.651 m (5' 5\")   Wt 71.2 kg (157 lb)   SpO2 95%   BMI 26.13 kg/m²     Physical Exam  Constitutional:       General: She is not in acute distress.     Appearance: She is well-developed. She is not diaphoretic.   HENT:      Head: Normocephalic and atraumatic.   Pulmonary:      Effort: Pulmonary effort is normal.   Skin:     General: Skin is warm and dry.      Findings: No erythema.   Neurological:      Mental Status: She is alert and oriented to person, place, and time.       Assessment/Plan:   Assessment    1. Pinworms  - albendazole (ALBENZA) 200 MG Tab; Take 2 Tablets by mouth one time for 1 dose.  Dispense: 2 tablet; Refill: 1    Patient's daughter with pinworms.  Patient currently asymptomatic.  Attempted to prescribe albendazole, however pending prior authorization which is being worked on.  In the meantime, patient and her daughter were advised " that they can  over-the-counter Pin-X and use this, as it can be nearly as effective.

## 2021-02-17 ENCOUNTER — HOSPITAL ENCOUNTER (OUTPATIENT)
Dept: RADIATION ONCOLOGY | Facility: MEDICAL CENTER | Age: 46
End: 2021-02-17
Payer: MEDICAID

## 2021-02-17 VITALS
DIASTOLIC BLOOD PRESSURE: 62 MMHG | BODY MASS INDEX: 26.08 KG/M2 | HEART RATE: 76 BPM | WEIGHT: 156.75 LBS | OXYGEN SATURATION: 98 % | SYSTOLIC BLOOD PRESSURE: 109 MMHG | TEMPERATURE: 96 F

## 2021-02-17 DIAGNOSIS — C50.811 MALIGNANT NEOPLASM OF OVERLAPPING SITES OF RIGHT BREAST IN FEMALE, ESTROGEN RECEPTOR POSITIVE (HCC): ICD-10-CM

## 2021-02-17 DIAGNOSIS — Z17.0 MALIGNANT NEOPLASM OF OVERLAPPING SITES OF RIGHT BREAST IN FEMALE, ESTROGEN RECEPTOR POSITIVE (HCC): ICD-10-CM

## 2021-02-17 LAB

## 2021-02-17 PROCEDURE — 77412 RADIATION TX DELIVERY LVL 3: CPT | Performed by: RADIOLOGY

## 2021-02-17 ASSESSMENT — FIBROSIS 4 INDEX: FIB4 SCORE: 0.63

## 2021-02-17 ASSESSMENT — PAIN SCALES - GENERAL: PAINLEVEL: NO PAIN

## 2021-02-17 NOTE — ON TREATMENT VISIT
ON TREATMENT NOTE  RADIATION ONCOLOGY DEPARTMENT    Patient name:  Deisy Galo    Primary Physician:  Pcp Pt States None MRN: [id number]  CSN: [id number]   Referring physician:  Aspen Woodruff M.D. : 1975, 45 y.o.     ENCOUNTER DATE:  21    DIAGNOSIS:    Malignant neoplasm of overlapping sites of right breast in female, estrogen receptor positive (HCC)  Staging form: Breast, AJCC 8th Edition  - Pathologic: Stage IB (pT2, pN1a, cM0, G3, ER+, [place]+, HER2+) - Signed by Juana Perrin M.D. on 2020  Stage prefix: Initial diagnosis  Method of lymph node assessment: Axillary lymph node dissection  Histologic grading system: 3 grade system      TREATMENT SUMMARY:  Radiation Treatments     Active   Plans   R_Breast   Most recent treatment: Dose planned: 180 cGy (fraction 17 of 28 on 2021)   Total: Dose planned: 5,040 cGy   Elapsed Days: 23 @ [id number]      R_SCV   Most recent treatment: Dose planned: 180 cGy (fraction 17 of 28 on 2021)   Total: Dose planned: 5,040 cGy   Elapsed Days: 23 @ [id number]      Reference Points   R_Breast   Most recent treatment: Dose given: 180 cGy (on 2021)   Total: Dose given: 3,060 cGy   Elapsed Days: 23 @ [id number]      R_Breast CP2   Most recent treatment: Dose given: 180 cGy (on 2021)   Total: Dose given: 3,060 cGy   Elapsed Days: 23 @ [id number]      R_SCV   Most recent treatment: Dose given: 180 cGy (on 2021)   Total: Dose given: 3,060 cGy   Elapsed Days: 23 @ [id number]      R_SCV CP   Most recent treatment: Dose given: 180 cGy (on 2021)   Total: Dose given: 3,060 cGy   Elapsed Days: 23 @ [id number]                    SUBJECTIVE:   Doing well slight fatigue soreness in the right axilla      VITAL SIGNS:  /62 (BP Location: Left arm, Patient Position: Sitting, BP Cuff Size: Adult)   Pulse 76   Temp (!) 35.6 °C (96 °F) (Temporal)   Wt 71.1 kg (156 lb 12 oz)   SpO2 98%    Encounter Vitals  Temperature: (!)  35.6 °C (96 °F)  Temp src: Temporal  Blood Pressure: 109/62  Pulse: 76  Pulse Oximetry: 98 %  Weight: 71.1 kg (156 lb 12 oz)  Pain Score: No pain  Pain Assessment 2/17/2021 2/10/2021 2/3/2021   Pain Assessment Denies Pain Denies Pain Denies Pain   Pain Score 0 0 0   Some recent data might be hidden          PHYSICAL EXAM:  Risk erythematous reaction in the axilla and inframammary fold as well as supraclav      Toxicity Assessment 2/17/2021 2/10/2021 2/3/2021 1/27/2021   Toxicity Assessment Breast Breast Breast Breast   Fatigue (lethargy, malaise, asthenia) Increased fatigue over baseline, but not altering normal activities Increased fatigue over baseline, but not altering normal activities Increased fatigue over baseline, but not altering normal activities None   Fever (in the absence of neutropenia) None None None None   Radiation Dermatitis Faint erythema or dry desquamation None None None   Lymphatics Normal Normal Normal Normal   RT - Pain due to RT None None None None   Dyspnea Normal Normal Normal Normal         IMPRESSION:  Cancer Staging  Malignant neoplasm of overlapping sites of right breast in female, estrogen receptor positive (HCC)  Staging form: Breast, AJCC 8th Edition  - Pathologic: Stage IB (pT2, pN1a, cM0, G3, ER+, [place]+, HER2+) - Signed by Juana Perrin M.D. on 8/25/2020      PLAN:  No change in treatment plan .  Recommended Silvadene cream and more liberal use of Aquaphor    Disposition:  Treatment plan reviewed. Questions answered. Continue therapy outlined.     Juana Perrin M.D.    Orders Placed This Encounter   • silver sulfADIAZINE (SILVADENE) 1 % Cream

## 2021-02-18 ENCOUNTER — HOSPITAL ENCOUNTER (OUTPATIENT)
Dept: RADIATION ONCOLOGY | Facility: MEDICAL CENTER | Age: 46
End: 2021-02-18
Payer: MEDICAID

## 2021-02-18 LAB

## 2021-02-18 PROCEDURE — 77412 RADIATION TX DELIVERY LVL 3: CPT | Performed by: RADIOLOGY

## 2021-02-19 ENCOUNTER — HOSPITAL ENCOUNTER (OUTPATIENT)
Dept: RADIATION ONCOLOGY | Facility: MEDICAL CENTER | Age: 46
End: 2021-02-19
Payer: MEDICAID

## 2021-02-19 LAB

## 2021-02-19 PROCEDURE — 77412 RADIATION TX DELIVERY LVL 3: CPT | Performed by: RADIOLOGY

## 2021-02-22 ENCOUNTER — HOSPITAL ENCOUNTER (OUTPATIENT)
Dept: RADIATION ONCOLOGY | Facility: MEDICAL CENTER | Age: 46
End: 2021-02-22
Payer: MEDICAID

## 2021-02-22 LAB

## 2021-02-22 PROCEDURE — 77336 RADIATION PHYSICS CONSULT: CPT | Performed by: RADIOLOGY

## 2021-02-22 PROCEDURE — 77412 RADIATION TX DELIVERY LVL 3: CPT | Performed by: RADIOLOGY

## 2021-02-22 PROCEDURE — 77427 RADIATION TX MANAGEMENT X5: CPT | Performed by: RADIOLOGY

## 2021-02-23 ENCOUNTER — HOSPITAL ENCOUNTER (OUTPATIENT)
Dept: RADIATION ONCOLOGY | Facility: MEDICAL CENTER | Age: 46
End: 2021-02-23
Payer: MEDICAID

## 2021-02-23 LAB

## 2021-02-23 PROCEDURE — 77412 RADIATION TX DELIVERY LVL 3: CPT | Performed by: RADIOLOGY

## 2021-02-23 PROCEDURE — 77417 THER RADIOLOGY PORT IMAGE(S): CPT | Performed by: RADIOLOGY

## 2021-02-24 ENCOUNTER — HOSPITAL ENCOUNTER (OUTPATIENT)
Dept: RADIATION ONCOLOGY | Facility: MEDICAL CENTER | Age: 46
End: 2021-02-24
Payer: MEDICAID

## 2021-02-24 VITALS
OXYGEN SATURATION: 96 % | WEIGHT: 158.07 LBS | DIASTOLIC BLOOD PRESSURE: 78 MMHG | SYSTOLIC BLOOD PRESSURE: 114 MMHG | HEART RATE: 76 BPM | BODY MASS INDEX: 26.3 KG/M2 | TEMPERATURE: 97.8 F

## 2021-02-24 LAB

## 2021-02-24 PROCEDURE — 77412 RADIATION TX DELIVERY LVL 3: CPT | Performed by: RADIOLOGY

## 2021-02-24 ASSESSMENT — FIBROSIS 4 INDEX: FIB4 SCORE: 0.63

## 2021-02-24 ASSESSMENT — PAIN SCALES - GENERAL: PAINLEVEL: NO PAIN

## 2021-02-24 NOTE — ON TREATMENT VISIT
ON TREATMENT NOTE  RADIATION ONCOLOGY DEPARTMENT    Patient name:  Deisy Galo    Primary Physician:  Pcp Pt States None MRN: [id number]  CSN: [id number]   Referring physician:  Aspen Woodruff M.D. : 1975, 45 y.o.     ENCOUNTER DATE:  21    DIAGNOSIS:    Malignant neoplasm of overlapping sites of right breast in female, estrogen receptor positive (HCC)  Staging form: Breast, AJCC 8th Edition  - Pathologic: Stage IB (pT2, pN1a, cM0, G3, ER+, [place]+, HER2+) - Signed by Juana Perrin M.D. on 2020  Stage prefix: Initial diagnosis  Method of lymph node assessment: Axillary lymph node dissection  Histologic grading system: 3 grade system      TREATMENT SUMMARY:  Radiation Treatments     Active   Plans   R_Breast   Most recent treatment: Dose planned: 180 cGy (fraction 22 of 28 on 2021)   Total: Dose planned: 5,040 cGy   Elapsed Days: 30 @ [id number]      R_SCV   Most recent treatment: Dose planned: 180 cGy (fraction 22 of 28 on 2021)   Total: Dose planned: 5,040 cGy   Elapsed Days: 30 @ [id number]      Reference Points   R_Breast   Most recent treatment: Dose given: 180 cGy (on 2021)   Total: Dose given: 3,960 cGy   Elapsed Days: 30 @ [id number]      R_Breast CP2   Most recent treatment: Dose given: 180 cGy (on 2021)   Total: Dose given: 3,960 cGy   Elapsed Days: 30 @ [id number]      R_SCV   Most recent treatment: Dose given: 180 cGy (on 2021)   Total: Dose given: 3,960 cGy   Elapsed Days: 30 @ [id number]      R_SCV CP   Most recent treatment: Dose given: 180 cGy (on 2021)   Total: Dose given: 3,960 cGy   Elapsed Days: 30 @ [id number]                    SUBJECTIVE:   Tolerating therapy well she does have a sore nipple.  I had prescribed Silvadene cream but she has not filled that yet.      VITAL SIGNS:  /78 (BP Location: Left arm, Patient Position: Sitting, BP Cuff Size: Adult)   Pulse 76   Temp 36.6 °C (97.8 °F) (Temporal)   Wt 71.7 kg  (158 lb 1.1 oz)   SpO2 96%    Encounter Vitals  Temperature: 36.6 °C (97.8 °F)  Temp src: Temporal  Blood Pressure: 114/78  Pulse: 76  Pulse Oximetry: 96 %  Weight: 71.7 kg (158 lb 1.1 oz)  Pain Score: No pain  Pain Assessment 2/24/2021 2/17/2021 2/10/2021   Pain Assessment Denies Pain Denies Pain Denies Pain   Pain Score 0 0 0   Some recent data might be hidden          PHYSICAL EXAM:  Brisk erythematous reaction on the nipple and in the axilla      Toxicity Assessment 2/24/2021 2/17/2021 2/10/2021 2/3/2021 1/27/2021   Toxicity Assessment Breast Breast Breast Breast Breast   Fatigue (lethargy, malaise, asthenia) Increased fatigue over baseline, but not altering normal activities Increased fatigue over baseline, but not altering normal activities Increased fatigue over baseline, but not altering normal activities Increased fatigue over baseline, but not altering normal activities None   Fever (in the absence of neutropenia) None None None None None   Radiation Dermatitis Faint erythema or dry desquamation Faint erythema or dry desquamation None None None   Lymphatics Normal Normal Normal Normal Normal   RT - Pain due to RT None None None None None   Dyspnea Normal Normal Normal Normal Normal         IMPRESSION:  Cancer Staging  Malignant neoplasm of overlapping sites of right breast in female, estrogen receptor positive (HCC)  Staging form: Breast, AJCC 8th Edition  - Pathologic: Stage IB (pT2, pN1a, cM0, G3, ER+, [place]+, HER2+) - Signed by Juana Perrin M.D. on 8/25/2020      PLAN:  No change in treatment plan    Disposition:  Treatment plan reviewed. Questions answered. Continue therapy outlined.     Juana Perrin M.D.    No orders of the defined types were placed in this encounter.

## 2021-02-25 ENCOUNTER — HOSPITAL ENCOUNTER (OUTPATIENT)
Dept: RADIATION ONCOLOGY | Facility: MEDICAL CENTER | Age: 46
End: 2021-02-25
Payer: MEDICAID

## 2021-02-25 LAB

## 2021-02-25 PROCEDURE — 77412 RADIATION TX DELIVERY LVL 3: CPT | Performed by: RADIOLOGY

## 2021-02-26 ENCOUNTER — HOSPITAL ENCOUNTER (OUTPATIENT)
Dept: RADIATION ONCOLOGY | Facility: MEDICAL CENTER | Age: 46
End: 2021-02-26
Payer: MEDICAID

## 2021-02-26 LAB

## 2021-02-26 PROCEDURE — 77412 RADIATION TX DELIVERY LVL 3: CPT | Performed by: RADIOLOGY

## 2021-03-01 ENCOUNTER — HOSPITAL ENCOUNTER (OUTPATIENT)
Dept: RADIATION ONCOLOGY | Facility: MEDICAL CENTER | Age: 46
End: 2021-03-01
Payer: MEDICAID

## 2021-03-01 ENCOUNTER — HOSPITAL ENCOUNTER (OUTPATIENT)
Dept: RADIATION ONCOLOGY | Facility: MEDICAL CENTER | Age: 46
End: 2021-03-31
Attending: RADIOLOGY
Payer: MEDICAID

## 2021-03-01 LAB
CHEMOTHERAPY INFUSION START DATE: NORMAL
CHEMOTHERAPY RECORDS: 1.8
CHEMOTHERAPY RECORDS: 1.8
CHEMOTHERAPY RECORDS: 5040
CHEMOTHERAPY RECORDS: 5040
CHEMOTHERAPY RECORDS: NORMAL
CHEMOTHERAPY RX CANCER: NORMAL
DATE 1ST CHEMO CANCER: NORMAL
RAD ONC ARIA COURSE LAST TREATMENT DATE: NORMAL
RAD ONC ARIA COURSE TREATMENT ELAPSED DAYS: NORMAL
RAD ONC ARIA REFERENCE POINT DOSAGE GIVEN TO DATE: 45
RAD ONC ARIA REFERENCE POINT ID: NORMAL
RAD ONC ARIA REFERENCE POINT SESSION DOSAGE GIVEN: 1.8

## 2021-03-01 PROCEDURE — 77412 RADIATION TX DELIVERY LVL 3: CPT | Performed by: RADIOLOGY

## 2021-03-01 PROCEDURE — 77336 RADIATION PHYSICS CONSULT: CPT | Performed by: RADIOLOGY

## 2021-03-01 PROCEDURE — 77427 RADIATION TX MANAGEMENT X5: CPT | Performed by: RADIOLOGY

## 2021-03-02 ENCOUNTER — HOSPITAL ENCOUNTER (OUTPATIENT)
Dept: RADIATION ONCOLOGY | Facility: MEDICAL CENTER | Age: 46
End: 2021-03-02
Payer: MEDICAID

## 2021-03-02 ENCOUNTER — HOSPITAL ENCOUNTER (OUTPATIENT)
Dept: LAB | Facility: MEDICAL CENTER | Age: 46
End: 2021-03-02
Attending: NURSE PRACTITIONER
Payer: MEDICAID

## 2021-03-02 LAB
ALBUMIN SERPL BCP-MCNC: 4.2 G/DL (ref 3.2–4.9)
ALBUMIN/GLOB SERPL: 1.6 G/DL
ALP SERPL-CCNC: 52 U/L (ref 30–99)
ALT SERPL-CCNC: 18 U/L (ref 2–50)
ANION GAP SERPL CALC-SCNC: 8 MMOL/L (ref 7–16)
AST SERPL-CCNC: 15 U/L (ref 12–45)
BILIRUB SERPL-MCNC: 0.4 MG/DL (ref 0.1–1.5)
BUN SERPL-MCNC: 12 MG/DL (ref 8–22)
CALCIUM SERPL-MCNC: 9.7 MG/DL (ref 8.5–10.5)
CHEMOTHERAPY INFUSION START DATE: NORMAL
CHEMOTHERAPY RECORDS: 1.8
CHEMOTHERAPY RECORDS: 1.8
CHEMOTHERAPY RECORDS: 5040
CHEMOTHERAPY RECORDS: 5040
CHEMOTHERAPY RECORDS: NORMAL
CHEMOTHERAPY RX CANCER: NORMAL
CHLORIDE SERPL-SCNC: 99 MMOL/L (ref 96–112)
CO2 SERPL-SCNC: 26 MMOL/L (ref 20–33)
CREAT SERPL-MCNC: 0.79 MG/DL (ref 0.5–1.4)
DATE 1ST CHEMO CANCER: NORMAL
GLOBULIN SER CALC-MCNC: 2.6 G/DL (ref 1.9–3.5)
GLUCOSE SERPL-MCNC: 90 MG/DL (ref 65–99)
MAGNESIUM SERPL-MCNC: 1.5 MG/DL (ref 1.5–2.5)
POTASSIUM SERPL-SCNC: 3.7 MMOL/L (ref 3.6–5.5)
PROT SERPL-MCNC: 6.8 G/DL (ref 6–8.2)
RAD ONC ARIA COURSE LAST TREATMENT DATE: NORMAL
RAD ONC ARIA COURSE TREATMENT ELAPSED DAYS: NORMAL
RAD ONC ARIA REFERENCE POINT DOSAGE GIVEN TO DATE: 46.8
RAD ONC ARIA REFERENCE POINT ID: NORMAL
RAD ONC ARIA REFERENCE POINT SESSION DOSAGE GIVEN: 1.8
SODIUM SERPL-SCNC: 133 MMOL/L (ref 135–145)

## 2021-03-02 PROCEDURE — 83735 ASSAY OF MAGNESIUM: CPT

## 2021-03-02 PROCEDURE — 77307 TELETHX ISODOSE PLAN CPLX: CPT | Mod: 26 | Performed by: RADIOLOGY

## 2021-03-02 PROCEDURE — 77307 TELETHX ISODOSE PLAN CPLX: CPT | Performed by: RADIOLOGY

## 2021-03-02 PROCEDURE — 77412 RADIATION TX DELIVERY LVL 3: CPT | Performed by: RADIOLOGY

## 2021-03-02 PROCEDURE — 77417 THER RADIOLOGY PORT IMAGE(S): CPT | Performed by: RADIOLOGY

## 2021-03-02 PROCEDURE — 80053 COMPREHEN METABOLIC PANEL: CPT

## 2021-03-02 PROCEDURE — 77334 RADIATION TREATMENT AID(S): CPT | Mod: 26 | Performed by: RADIOLOGY

## 2021-03-02 PROCEDURE — 77334 RADIATION TREATMENT AID(S): CPT | Performed by: RADIOLOGY

## 2021-03-03 ENCOUNTER — HOSPITAL ENCOUNTER (OUTPATIENT)
Dept: RADIATION ONCOLOGY | Facility: MEDICAL CENTER | Age: 46
End: 2021-03-03
Payer: MEDICAID

## 2021-03-03 VITALS
DIASTOLIC BLOOD PRESSURE: 63 MMHG | SYSTOLIC BLOOD PRESSURE: 100 MMHG | TEMPERATURE: 97 F | OXYGEN SATURATION: 97 % | HEART RATE: 79 BPM

## 2021-03-03 LAB

## 2021-03-03 PROCEDURE — 77417 THER RADIOLOGY PORT IMAGE(S): CPT | Performed by: RADIOLOGY

## 2021-03-03 PROCEDURE — 77412 RADIATION TX DELIVERY LVL 3: CPT | Performed by: RADIOLOGY

## 2021-03-03 ASSESSMENT — PAIN SCALES - GENERAL: PAINLEVEL: NO PAIN

## 2021-03-03 NOTE — ON TREATMENT VISIT
ON TREATMENT NOTE  RADIATION ONCOLOGY DEPARTMENT    Patient name:  Deisy Galo    Primary Physician:  Pcp Pt States None MRN: [id number]  CSN: [id number]   Referring physician:  Aspen Woodruff M.D. : 1975, 45 y.o.     ENCOUNTER DATE:  21    DIAGNOSIS:    Malignant neoplasm of overlapping sites of right breast in female, estrogen receptor positive (HCC)  Staging form: Breast, AJCC 8th Edition  - Pathologic: Stage IB (pT2, pN1a, cM0, G3, ER+, ID+, HER2+) - Signed by Juana Perrin M.D. on 2020  Stage prefix: Initial diagnosis  Method of lymph node assessment: Axillary lymph node dissection  Histologic grading system: 3 grade system      TREATMENT SUMMARY:  Aria Treatment Information        Some values may be hidden. Unless noted otherwise, only the newest values recorded on each date are displayed.         Aria Treatment Summary 3/3/21   Course First Treatment Date 2021   Course Last Treatment Date 2021   R_Breast Plan from Course C1_R_breast   Fraction    Elapsed Course Days 37 @ [id number]   Prescribed Fraction Dose 180 cGy   Prescribed Total Dose 5,040 cGy   R_SCV Plan from Course C1_R_breast   Fraction    Elapsed Course Days  @ [id number]   Prescribed Fraction Dose 180 cGy   Prescribed Total Dose 5,040 cGy   R_Breast Reference Point from Course C1_R_breast   Elapsed Course Days 37 @ [id number]   Session Dose 180 cGy   Total Dose 4,860 cGy   R_Breast CP2 Reference Point from Course C1_R_breast   Elapsed Course Days  @    Session Dose 180 cGy   Total Dose 4,860 cGy   R_SCV Reference Point from Course C1_R_breast   Elapsed Course Days 37 @ [id number]   Session Dose 180 cGy   Total Dose 4,860 cGy   R_SCV CP Reference Point from Course C1_R_breast   Elapsed Course Days  @ [id number]   Session Dose 180 cGy   Total Dose 4,860 cGy              SUBJECTIVE:   Patient is doing well.  She continues to use Silvadene.  She has not had any  area of skin breakdown.    VITAL SIGNS:    Encounter Vitals  Temperature: 36.1 °C (97 °F)  Temp src: Temporal  Blood Pressure: 100/63  Pulse: 79  Pulse Oximetry: 97 %  Pain Score: No pain  Pain Assessment 3/3/2021 2/24/2021 2/17/2021   Pain Assessment Denies Pain Denies Pain Denies Pain   Pain Score 0 0 0   Some recent data might be hidden          PHYSICAL EXAM:  Moderate erythema in the treatment field    TOXICITY  Toxicity Assessment 3/3/2021 2/24/2021 2/17/2021 2/10/2021 2/3/2021 1/27/2021   Toxicity Assessment Breast Breast Breast Breast Breast Breast   Fatigue (lethargy, malaise, asthenia) None Increased fatigue over baseline, but not altering normal activities Increased fatigue over baseline, but not altering normal activities Increased fatigue over baseline, but not altering normal activities Increased fatigue over baseline, but not altering normal activities None   Fever (in the absence of neutropenia) None None None None None None   Radiation Dermatitis Faint erythema or dry desquamation Faint erythema or dry desquamation Faint erythema or dry desquamation None None None   Lymphatics Normal Normal Normal Normal Normal Normal   RT - Pain due to RT None None None None None None   Dyspnea Normal Normal Normal Normal Normal Normal         IMPRESSION:  Cancer Staging  Malignant neoplasm of overlapping sites of right breast in female, estrogen receptor positive (HCC)  Staging form: Breast, AJCC 8th Edition  - Pathologic: Stage IB (pT2, pN1a, cM0, G3, ER+, TN+, HER2+) - Signed by Juana Perrin M.D. on 8/25/2020      PLAN:  No change in treatment plan    Disposition:  Treatment plan reviewed. Questions answered. Continue therapy outlined.     Yaniv Steele M.D.    No orders of the defined types were placed in this encounter.

## 2021-03-04 ENCOUNTER — HOSPITAL ENCOUNTER (OUTPATIENT)
Dept: RADIATION ONCOLOGY | Facility: MEDICAL CENTER | Age: 46
End: 2021-03-04
Payer: MEDICAID

## 2021-03-04 LAB

## 2021-03-04 PROCEDURE — 77412 RADIATION TX DELIVERY LVL 3: CPT | Performed by: RADIOLOGY

## 2021-03-05 ENCOUNTER — HOSPITAL ENCOUNTER (OUTPATIENT)
Dept: RADIATION ONCOLOGY | Facility: MEDICAL CENTER | Age: 46
End: 2021-03-05
Payer: MEDICAID

## 2021-03-05 LAB
CHEMOTHERAPY INFUSION START DATE: NORMAL
CHEMOTHERAPY RECORDS: 1000
CHEMOTHERAPY RECORDS: 2
CHEMOTHERAPY RECORDS: NORMAL
CHEMOTHERAPY RX CANCER: NORMAL
DATE 1ST CHEMO CANCER: NORMAL
RAD ONC ARIA COURSE LAST TREATMENT DATE: NORMAL
RAD ONC ARIA COURSE TREATMENT ELAPSED DAYS: NORMAL
RAD ONC ARIA REFERENCE POINT DOSAGE GIVEN TO DATE: 2
RAD ONC ARIA REFERENCE POINT DOSAGE GIVEN TO DATE: 2.09
RAD ONC ARIA REFERENCE POINT ID: NORMAL
RAD ONC ARIA REFERENCE POINT ID: NORMAL
RAD ONC ARIA REFERENCE POINT SESSION DOSAGE GIVEN: 2
RAD ONC ARIA REFERENCE POINT SESSION DOSAGE GIVEN: 2.09

## 2021-03-05 PROCEDURE — 77280 THER RAD SIMULAJ FIELD SMPL: CPT | Mod: 26 | Performed by: RADIOLOGY

## 2021-03-05 PROCEDURE — 77280 THER RAD SIMULAJ FIELD SMPL: CPT | Performed by: RADIOLOGY

## 2021-03-05 PROCEDURE — 77412 RADIATION TX DELIVERY LVL 3: CPT | Performed by: RADIOLOGY

## 2021-03-08 ENCOUNTER — HOSPITAL ENCOUNTER (OUTPATIENT)
Dept: RADIATION ONCOLOGY | Facility: MEDICAL CENTER | Age: 46
End: 2021-03-08
Payer: MEDICAID

## 2021-03-08 LAB
CHEMOTHERAPY INFUSION START DATE: NORMAL
CHEMOTHERAPY RECORDS: 1000
CHEMOTHERAPY RECORDS: 2
CHEMOTHERAPY RECORDS: NORMAL
CHEMOTHERAPY RX CANCER: NORMAL
DATE 1ST CHEMO CANCER: NORMAL
RAD ONC ARIA COURSE LAST TREATMENT DATE: NORMAL
RAD ONC ARIA COURSE TREATMENT ELAPSED DAYS: NORMAL
RAD ONC ARIA REFERENCE POINT DOSAGE GIVEN TO DATE: 4
RAD ONC ARIA REFERENCE POINT DOSAGE GIVEN TO DATE: 4.19
RAD ONC ARIA REFERENCE POINT ID: NORMAL
RAD ONC ARIA REFERENCE POINT ID: NORMAL
RAD ONC ARIA REFERENCE POINT SESSION DOSAGE GIVEN: 2
RAD ONC ARIA REFERENCE POINT SESSION DOSAGE GIVEN: 2.09

## 2021-03-08 PROCEDURE — 77412 RADIATION TX DELIVERY LVL 3: CPT | Performed by: RADIOLOGY

## 2021-03-08 PROCEDURE — 77427 RADIATION TX MANAGEMENT X5: CPT | Performed by: RADIOLOGY

## 2021-03-08 PROCEDURE — 77336 RADIATION PHYSICS CONSULT: CPT | Performed by: RADIOLOGY

## 2021-03-08 PROCEDURE — 77014 PR CT GUIDANCE PLACEMENT RAD THERAPY FIELDS: CPT | Mod: 26 | Performed by: RADIOLOGY

## 2021-03-08 PROCEDURE — 77387 GUIDANCE FOR RADJ TX DLVR: CPT | Performed by: RADIOLOGY

## 2021-03-09 ENCOUNTER — HOSPITAL ENCOUNTER (OUTPATIENT)
Dept: RADIATION ONCOLOGY | Facility: MEDICAL CENTER | Age: 46
End: 2021-03-09
Payer: MEDICAID

## 2021-03-09 LAB
CHEMOTHERAPY INFUSION START DATE: NORMAL
CHEMOTHERAPY RECORDS: 1000
CHEMOTHERAPY RECORDS: 2
CHEMOTHERAPY RECORDS: NORMAL
CHEMOTHERAPY RX CANCER: NORMAL
DATE 1ST CHEMO CANCER: NORMAL
RAD ONC ARIA COURSE LAST TREATMENT DATE: NORMAL
RAD ONC ARIA COURSE TREATMENT ELAPSED DAYS: NORMAL
RAD ONC ARIA REFERENCE POINT DOSAGE GIVEN TO DATE: 6
RAD ONC ARIA REFERENCE POINT DOSAGE GIVEN TO DATE: 6.28
RAD ONC ARIA REFERENCE POINT ID: NORMAL
RAD ONC ARIA REFERENCE POINT ID: NORMAL
RAD ONC ARIA REFERENCE POINT SESSION DOSAGE GIVEN: 2
RAD ONC ARIA REFERENCE POINT SESSION DOSAGE GIVEN: 2.09

## 2021-03-09 PROCEDURE — 77412 RADIATION TX DELIVERY LVL 3: CPT | Performed by: RADIOLOGY

## 2021-03-09 PROCEDURE — 77387 GUIDANCE FOR RADJ TX DLVR: CPT | Performed by: RADIOLOGY

## 2021-03-09 PROCEDURE — 77014 PR CT GUIDANCE PLACEMENT RAD THERAPY FIELDS: CPT | Mod: 26 | Performed by: RADIOLOGY

## 2021-03-10 ENCOUNTER — HOSPITAL ENCOUNTER (OUTPATIENT)
Dept: RADIATION ONCOLOGY | Facility: MEDICAL CENTER | Age: 46
End: 2021-03-10
Payer: MEDICAID

## 2021-03-10 ENCOUNTER — HOSPITAL ENCOUNTER (OUTPATIENT)
Dept: CARDIOLOGY | Facility: MEDICAL CENTER | Age: 46
End: 2021-03-10
Attending: INTERNAL MEDICINE
Payer: MEDICAID

## 2021-03-10 VITALS
SYSTOLIC BLOOD PRESSURE: 93 MMHG | HEART RATE: 81 BPM | DIASTOLIC BLOOD PRESSURE: 70 MMHG | WEIGHT: 155.65 LBS | BODY MASS INDEX: 25.9 KG/M2 | OXYGEN SATURATION: 94 % | TEMPERATURE: 98 F

## 2021-03-10 DIAGNOSIS — C50.111 MALIGNANT NEOPLASM OF CENTRAL PORTION OF RIGHT FEMALE BREAST, UNSPECIFIED ESTROGEN RECEPTOR STATUS (HCC): ICD-10-CM

## 2021-03-10 LAB
CHEMOTHERAPY INFUSION START DATE: NORMAL
CHEMOTHERAPY RECORDS: 1000
CHEMOTHERAPY RECORDS: 2
CHEMOTHERAPY RECORDS: NORMAL
CHEMOTHERAPY RX CANCER: NORMAL
DATE 1ST CHEMO CANCER: NORMAL
LV EJECT FRACT MOD 2C 99903: 62.45
LV EJECT FRACT MOD 4C 99902: 59.86
LV EJECT FRACT MOD BP 99901: 62.23
RAD ONC ARIA COURSE LAST TREATMENT DATE: NORMAL
RAD ONC ARIA COURSE TREATMENT ELAPSED DAYS: NORMAL
RAD ONC ARIA REFERENCE POINT DOSAGE GIVEN TO DATE: 8
RAD ONC ARIA REFERENCE POINT DOSAGE GIVEN TO DATE: 8.38
RAD ONC ARIA REFERENCE POINT ID: NORMAL
RAD ONC ARIA REFERENCE POINT ID: NORMAL
RAD ONC ARIA REFERENCE POINT SESSION DOSAGE GIVEN: 2
RAD ONC ARIA REFERENCE POINT SESSION DOSAGE GIVEN: 2.09

## 2021-03-10 PROCEDURE — 77387 GUIDANCE FOR RADJ TX DLVR: CPT | Performed by: RADIOLOGY

## 2021-03-10 PROCEDURE — 93308 TTE F-UP OR LMTD: CPT | Mod: 26 | Performed by: INTERNAL MEDICINE

## 2021-03-10 PROCEDURE — 77014 PR CT GUIDANCE PLACEMENT RAD THERAPY FIELDS: CPT | Mod: 26 | Performed by: RADIOLOGY

## 2021-03-10 PROCEDURE — 77412 RADIATION TX DELIVERY LVL 3: CPT | Performed by: RADIOLOGY

## 2021-03-10 PROCEDURE — 93308 TTE F-UP OR LMTD: CPT

## 2021-03-10 ASSESSMENT — PAIN SCALES - GENERAL: PAINLEVEL: NO PAIN

## 2021-03-10 ASSESSMENT — FIBROSIS 4 INDEX: FIB4 SCORE: 0.45

## 2021-03-11 ENCOUNTER — HOSPITAL ENCOUNTER (OUTPATIENT)
Dept: RADIATION ONCOLOGY | Facility: MEDICAL CENTER | Age: 46
End: 2021-03-11
Payer: MEDICAID

## 2021-03-11 LAB
CHEMOTHERAPY INFUSION START DATE: NORMAL
CHEMOTHERAPY RECORDS: 1000
CHEMOTHERAPY RECORDS: 2
CHEMOTHERAPY RECORDS: NORMAL
CHEMOTHERAPY RX CANCER: NORMAL
DATE 1ST CHEMO CANCER: NORMAL
RAD ONC ARIA COURSE LAST TREATMENT DATE: NORMAL
RAD ONC ARIA COURSE TREATMENT ELAPSED DAYS: NORMAL
RAD ONC ARIA REFERENCE POINT DOSAGE GIVEN TO DATE: 10
RAD ONC ARIA REFERENCE POINT DOSAGE GIVEN TO DATE: 10.47
RAD ONC ARIA REFERENCE POINT ID: NORMAL
RAD ONC ARIA REFERENCE POINT ID: NORMAL
RAD ONC ARIA REFERENCE POINT SESSION DOSAGE GIVEN: 2
RAD ONC ARIA REFERENCE POINT SESSION DOSAGE GIVEN: 2.09

## 2021-03-11 PROCEDURE — 77387 GUIDANCE FOR RADJ TX DLVR: CPT | Performed by: RADIOLOGY

## 2021-03-11 PROCEDURE — 77412 RADIATION TX DELIVERY LVL 3: CPT | Performed by: RADIOLOGY

## 2021-03-11 PROCEDURE — 77427 RADIATION TX MANAGEMENT X5: CPT | Performed by: RADIOLOGY

## 2021-03-11 PROCEDURE — 77014 PR CT GUIDANCE PLACEMENT RAD THERAPY FIELDS: CPT | Mod: 26 | Performed by: RADIOLOGY

## 2021-03-15 LAB
CHEMOTHERAPY INFUSION START DATE: NORMAL
CHEMOTHERAPY INFUSION STOP DATE: NORMAL
CHEMOTHERAPY RECORDS: 1.8
CHEMOTHERAPY RECORDS: 1.8
CHEMOTHERAPY RECORDS: 1000
CHEMOTHERAPY RECORDS: 2
CHEMOTHERAPY RECORDS: 5040
CHEMOTHERAPY RECORDS: 5040
CHEMOTHERAPY RECORDS: NORMAL
CHEMOTHERAPY RX CANCER: NORMAL
DATE 1ST CHEMO CANCER: NORMAL
RAD ONC ARIA COURSE LAST TREATMENT DATE: NORMAL
RAD ONC ARIA COURSE TREATMENT ELAPSED DAYS: NORMAL
RAD ONC ARIA REFERENCE POINT DOSAGE GIVEN TO DATE: 10
RAD ONC ARIA REFERENCE POINT DOSAGE GIVEN TO DATE: 10.47
RAD ONC ARIA REFERENCE POINT DOSAGE GIVEN TO DATE: 50.4
RAD ONC ARIA REFERENCE POINT ID: NORMAL

## 2021-03-23 ENCOUNTER — HOSPITAL ENCOUNTER (OUTPATIENT)
Dept: LAB | Facility: MEDICAL CENTER | Age: 46
End: 2021-03-23
Attending: INTERNAL MEDICINE
Payer: MEDICAID

## 2021-03-23 PROCEDURE — 80053 COMPREHEN METABOLIC PANEL: CPT

## 2021-03-24 LAB
ALBUMIN SERPL BCP-MCNC: 3.8 G/DL (ref 3.2–4.9)
ALBUMIN/GLOB SERPL: 1.5 G/DL
ALP SERPL-CCNC: 43 U/L (ref 30–99)
ALT SERPL-CCNC: 25 U/L (ref 2–50)
ANION GAP SERPL CALC-SCNC: 12 MMOL/L (ref 7–16)
AST SERPL-CCNC: 23 U/L (ref 12–45)
BILIRUB SERPL-MCNC: 0.3 MG/DL (ref 0.1–1.5)
BUN SERPL-MCNC: 9 MG/DL (ref 8–22)
CALCIUM SERPL-MCNC: 9.4 MG/DL (ref 8.5–10.5)
CHLORIDE SERPL-SCNC: 100 MMOL/L (ref 96–112)
CO2 SERPL-SCNC: 26 MMOL/L (ref 20–33)
CREAT SERPL-MCNC: 0.62 MG/DL (ref 0.5–1.4)
GLOBULIN SER CALC-MCNC: 2.6 G/DL (ref 1.9–3.5)
GLUCOSE SERPL-MCNC: 88 MG/DL (ref 65–99)
POTASSIUM SERPL-SCNC: 4 MMOL/L (ref 3.6–5.5)
PROT SERPL-MCNC: 6.4 G/DL (ref 6–8.2)
SODIUM SERPL-SCNC: 138 MMOL/L (ref 135–145)

## 2021-04-23 ENCOUNTER — APPOINTMENT (OUTPATIENT)
Dept: RADIATION ONCOLOGY | Facility: MEDICAL CENTER | Age: 46
End: 2021-04-23
Attending: RADIOLOGY
Payer: MEDICAID

## 2021-04-26 ENCOUNTER — HOSPITAL ENCOUNTER (OUTPATIENT)
Dept: RADIATION ONCOLOGY | Facility: MEDICAL CENTER | Age: 46
End: 2021-04-30
Attending: RADIOLOGY
Payer: MEDICAID

## 2021-04-26 VITALS
WEIGHT: 155.65 LBS | OXYGEN SATURATION: 95 % | TEMPERATURE: 97.3 F | DIASTOLIC BLOOD PRESSURE: 61 MMHG | HEART RATE: 81 BPM | SYSTOLIC BLOOD PRESSURE: 98 MMHG | BODY MASS INDEX: 25.9 KG/M2

## 2021-04-26 PROCEDURE — 99212 OFFICE O/P EST SF 10 MIN: CPT | Performed by: RADIOLOGY

## 2021-04-26 ASSESSMENT — FIBROSIS 4 INDEX: FIB4 SCORE: 0.59

## 2021-04-26 ASSESSMENT — PAIN SCALES - GENERAL: PAINLEVEL: NO PAIN

## 2021-04-26 NOTE — PROGRESS NOTES
RADIATION ONCOLOGY FOLLOW-UP    DATE OF SERVICE: 4/26/2021    IDENTIFICATION:   A 45 y.o. female with Grade 3 infiltrating ductal carcinoma of the right breast, triple positive.  Tumor was ER 60 ND 40 Ki-67 25-30 HER-2/manuel triple positive.She had 2 sentinel nodes positive there was positive lymphatic vessel invasion largest met was 1.7 cm and there was extranodal extension.She had positive margins on initial lumpectomy was 6 size twice with final margins being negative.Status post TCHP.  Radiation therapy complete 3/11/2021.Here for routine follow-up.    HISTORY OF PRESENT ILLNESS:   Since last seen patient has been doing well she had really no side effects from the radiation.  She did get Covid so her follow-up was delayed with us.  The only side effect she had was a little bit of congestion and fatigue.  She is also started tamoxifen and continuing on the Herceptin and Perjeta but this was held when she was diagnosed with a Covid.    CURRENT MEDICATIONS:  Current Outpatient Medications   Medication Sig Dispense Refill   • tamoxifen (NOLVADEX) 10 MG Tab Take 10 mg by mouth every day.     • tramadol (ULTRAM) 50 MG Tab Take 100 mg by mouth every four hours as needed.     • famotidine (PEPCID) 20 MG Tab Take 20 mg by mouth 2 times a day.     • Loratadine (CLARITIN) 10 MG Cap Take  by mouth.     • acetaminophen (TYLENOL) 500 MG Tab Take 500-1,000 mg by mouth every 6 hours as needed.     • Ibuprofen 200 MG Cap Take 200 mg by mouth as needed. Took 3 tabs yesterday  Indications: Inflammation     • docusate sodium (COLACE) 100 MG Cap Take 100 mg by mouth 2 times a day.     • ondansetron (ZOFRAN ODT) 4 MG TABLET DISPERSIBLE Take 4 mg by mouth every 6 hours as needed for Nausea.     • silver sulfADIAZINE (SILVADENE) 1 % Cream Apply 1/8 inch layer to affected area BID (Patient not taking: Reported on 4/26/2021) 400 g 2     No current facility-administered medications for this encounter.       ALLERGIES:  Latex    FAMILY  HISTORY:    Family History   Problem Relation Age of Onset   • Cancer Brother         BCC   • Cancer Maternal Uncle         lung cancer   • Cancer Maternal Grandmother         colon cancer/melanoma   • Cancer Maternal Grandfather         lymphoma   • Cancer Maternal Uncle         skin cancer (unknown type)   • Cancer Maternal great-grandmother         breast cancer   [unfilled]        SOCIAL HISTORY:     reports that she has never smoked. She has never used smokeless tobacco. She reports previous alcohol use. She reports that she does not use drugs.        REVIEW OF SYSTEMS: Is significant for Fatigue hot flashes sometimes some pain in the irradiated breast.  The rest of the review of systems has been reviewed.    PHYSICAL EXAM:     ECOG PERFORMANCE STATUS:  0= Fully active, able to carry on all pre-disease performance without restriction.   Vitals:    04/26/21 1026   BP: (!) 98/61   BP Location: Left arm   Patient Position: Sitting   BP Cuff Size: Adult   Pulse: 81   Temp: 36.3 °C (97.3 °F)   TempSrc: Temporal   SpO2: 95%   Weight: 70.6 kg (155 lb 10.3 oz)   Pain Score: No pain        GENERAL: Well-appearing alert and oriented x3 in no apparent distress  Breasts: Bilaterally symmetrical pendulous with excellent cosmetic result.  There is a slight retraction in the right nipple from the surgery.  She does have some hyperpigmentation on the right breast as well as some lymphedema.  No discrete masses are appreciated in either breast or axilla.  HEENT:  Pupils are equal, round, and reactive to light.  Extraocular muscles   are intact. Sclerae nonicteric.  Conjunctivae pink.  Oral cavity, tongue   protrudes midline.   NECK:  No preauricular neck supraclavicular axillary adenopathy.  LUNGS:  Clear to ascultation   HEART:  Regular rate and rhythm.  No murmur appreciated  ABDOMEN:  Soft. No evidence of hepatosplenomegaly.   EXTREMITIES:  Without Edema.  NEUROLOGIC:  Cranial nerves II through XII were intact. Normal  stance and gait motor and sensory grossly within normal limits          IMPRESSION:    A 45 y.o. female with Grade 3 infiltrating ductal carcinoma of the right breast, triple positive.  Tumor was ER 60 IN 40 Ki-67 25-30 HER-2/manuel triple positive.She had 2 sentinel nodes positive there was positive lymphatic vessel invasion largest met was 1.7 cm and there was extranodal extension.She had positive margins on initial lumpectomy was 6 size twice with final margins being negative.Status post TCHP and radiation therapy complete 3/11/2021.  She now has been started on tamoxifen and is continuing on with her Herceptin and Perjeta both of which were held when she was diagnosed with Covid.    RECOMMENDATIONS:     Patient is going to continue her follow-up based on national cancer guidelines with Dr. Sheila sabillon.  I am happy to see her on an as-needed basis peer      Thank you for the opportunity to participate in her care.  If any questions or comments, please do not hesitate in calling.      Please note that this dictation was created using voice recognition software. I have made every reasonable attempt to correct obvious errors, but I expect that there are errors of grammar and possibly content that I did not discover before finalizing the note.

## 2021-04-26 NOTE — NON-PROVIDER
Patient was seen today in clinic with Dr. Perrin for follow up.  Vitals signs and weight were obtained and pain assessment was completed.  Allergies and medications were reviewed with the patient.  Toxicities of treatment assessed.     Vitals/Pain:  Vitals:    04/26/21 1026   BP: (!) 98/61   BP Location: Left arm   Patient Position: Sitting   BP Cuff Size: Adult   Pulse: 81   Temp: 36.3 °C (97.3 °F)   TempSrc: Temporal   SpO2: 95%   Weight: 70.6 kg (155 lb 10.3 oz)   Pain Score: No pain        Allergies:   Latex    Current Medications:  Current Outpatient Medications   Medication Sig Dispense Refill   • tamoxifen (NOLVADEX) 10 MG Tab Take 10 mg by mouth every day.     • tramadol (ULTRAM) 50 MG Tab Take 100 mg by mouth every four hours as needed.     • famotidine (PEPCID) 20 MG Tab Take 20 mg by mouth 2 times a day.     • Loratadine (CLARITIN) 10 MG Cap Take  by mouth.     • acetaminophen (TYLENOL) 500 MG Tab Take 500-1,000 mg by mouth every 6 hours as needed.     • Ibuprofen 200 MG Cap Take 200 mg by mouth as needed. Took 3 tabs yesterday  Indications: Inflammation     • docusate sodium (COLACE) 100 MG Cap Take 100 mg by mouth 2 times a day.     • ondansetron (ZOFRAN ODT) 4 MG TABLET DISPERSIBLE Take 4 mg by mouth every 6 hours as needed for Nausea.     • silver sulfADIAZINE (SILVADENE) 1 % Cream Apply 1/8 inch layer to affected area BID (Patient not taking: Reported on 4/26/2021) 400 g 2     No current facility-administered medications for this encounter.           Zaira Hurley, Med Ass't

## 2021-06-09 ENCOUNTER — HOSPITAL ENCOUNTER (OUTPATIENT)
Dept: CARDIOLOGY | Facility: MEDICAL CENTER | Age: 46
End: 2021-06-09
Attending: INTERNAL MEDICINE
Payer: MEDICAID

## 2021-06-09 DIAGNOSIS — C50.111 MALIGNANT NEOPLASM OF CENTRAL PORTION OF RIGHT FEMALE BREAST, UNSPECIFIED ESTROGEN RECEPTOR STATUS (HCC): ICD-10-CM

## 2021-06-09 LAB
LV EJECT FRACT MOD 2C 99903: 70.32
LV EJECT FRACT MOD 4C 99902: 65.34
LV EJECT FRACT MOD BP 99901: 67.3

## 2021-06-09 PROCEDURE — 93308 TTE F-UP OR LMTD: CPT | Mod: 26 | Performed by: INTERNAL MEDICINE

## 2021-06-09 PROCEDURE — 93308 TTE F-UP OR LMTD: CPT

## 2021-07-21 ENCOUNTER — HOSPITAL ENCOUNTER (OUTPATIENT)
Dept: RADIOLOGY | Facility: MEDICAL CENTER | Age: 46
End: 2021-07-21
Attending: INTERNAL MEDICINE
Payer: MEDICAID

## 2021-07-21 ENCOUNTER — APPOINTMENT (OUTPATIENT)
Dept: RADIOLOGY | Facility: MEDICAL CENTER | Age: 46
End: 2021-07-21
Payer: MEDICAID

## 2021-07-21 DIAGNOSIS — Z12.31 VISIT FOR SCREENING MAMMOGRAM: ICD-10-CM

## 2021-07-21 PROCEDURE — 77063 BREAST TOMOSYNTHESIS BI: CPT

## 2021-07-22 ENCOUNTER — HOSPITAL ENCOUNTER (OUTPATIENT)
Dept: RADIOLOGY | Facility: MEDICAL CENTER | Age: 46
End: 2021-07-22
Attending: INTERNAL MEDICINE
Payer: MEDICAID

## 2021-07-22 DIAGNOSIS — C50.111 MALIGNANT NEOPLASM OF CENTRAL PORTION OF RIGHT FEMALE BREAST, UNSPECIFIED ESTROGEN RECEPTOR STATUS (HCC): ICD-10-CM

## 2021-07-22 PROCEDURE — 93971 EXTREMITY STUDY: CPT | Mod: RT

## 2021-09-01 ENCOUNTER — HOSPITAL ENCOUNTER (OUTPATIENT)
Dept: LAB | Facility: MEDICAL CENTER | Age: 46
End: 2021-09-01
Attending: NURSE PRACTITIONER
Payer: MEDICAID

## 2021-09-01 ENCOUNTER — HOSPITAL ENCOUNTER (OUTPATIENT)
Dept: CARDIOLOGY | Facility: MEDICAL CENTER | Age: 46
End: 2021-09-01
Attending: INTERNAL MEDICINE
Payer: MEDICAID

## 2021-09-01 DIAGNOSIS — C50.111 MALIGNANT NEOPLASM OF CENTRAL PORTION OF RIGHT FEMALE BREAST, UNSPECIFIED ESTROGEN RECEPTOR STATUS (HCC): ICD-10-CM

## 2021-09-01 LAB
25(OH)D3 SERPL-MCNC: 38 NG/ML (ref 30–100)
LV EJECT FRACT  99904: 55
LV EJECT FRACT MOD 2C 99903: 49.53
LV EJECT FRACT MOD 4C 99902: 58.81
LV EJECT FRACT MOD BP 99901: 51.64

## 2021-09-01 PROCEDURE — 36415 COLL VENOUS BLD VENIPUNCTURE: CPT

## 2021-09-01 PROCEDURE — 82306 VITAMIN D 25 HYDROXY: CPT

## 2021-09-01 PROCEDURE — 93306 TTE W/DOPPLER COMPLETE: CPT | Mod: 26 | Performed by: INTERNAL MEDICINE

## 2021-09-01 PROCEDURE — 93306 TTE W/DOPPLER COMPLETE: CPT

## 2021-09-13 ENCOUNTER — HOSPITAL ENCOUNTER (OUTPATIENT)
Dept: RADIOLOGY | Facility: MEDICAL CENTER | Age: 46
End: 2021-09-13
Attending: INTERNAL MEDICINE
Payer: MEDICAID

## 2021-09-13 DIAGNOSIS — M85.89 DISAPPEARING BONE DISEASE: ICD-10-CM

## 2021-09-13 PROCEDURE — 77080 DXA BONE DENSITY AXIAL: CPT

## 2021-10-27 ENCOUNTER — HOSPITAL ENCOUNTER (OUTPATIENT)
Dept: RADIOLOGY | Facility: MEDICAL CENTER | Age: 46
End: 2021-10-27
Attending: SURGERY
Payer: MEDICAID

## 2021-10-27 DIAGNOSIS — E04.9 NON-TOXIC GOITER: ICD-10-CM

## 2021-10-27 PROCEDURE — 76536 US EXAM OF HEAD AND NECK: CPT

## 2021-12-03 ENCOUNTER — HOSPITAL ENCOUNTER (OUTPATIENT)
Dept: RADIOLOGY | Facility: MEDICAL CENTER | Age: 46
End: 2021-12-03
Attending: SURGERY
Payer: MEDICAID

## 2021-12-03 DIAGNOSIS — E04.1 NONTOXIC UNINODULAR GOITER: ICD-10-CM

## 2021-12-03 PROCEDURE — 88112 CYTOPATH CELL ENHANCE TECH: CPT

## 2021-12-03 PROCEDURE — 10005 FNA BX W/US GDN 1ST LES: CPT

## 2021-12-04 NOTE — PROGRESS NOTES
"Left thyroid FNA performed by Dr Chao. Procedure BARs explained to patient by physician and consent obtained. Patient extremely anxious and, crying and tachypneic. Patient comforted and questions answered and she just \"needed to get it over with\". Procedure completed without complication. Anterior neck puncture site CDI; bandaid applied. Patient tolerated procedure fairly well and eventually calmed down. Patient discharged to home in good condition.   FNA samples submitted to pathology in cytolyt and affirma.      "

## 2021-12-06 LAB — PATHOLOGY CONSULT NOTE: NORMAL

## 2022-01-13 ENCOUNTER — PRE-ADMISSION TESTING (OUTPATIENT)
Dept: ADMISSIONS | Facility: MEDICAL CENTER | Age: 47
End: 2022-01-13
Attending: SURGERY
Payer: MEDICAID

## 2022-01-13 DIAGNOSIS — Z01.812 PRE-OPERATIVE LABORATORY EXAMINATION: ICD-10-CM

## 2022-01-13 LAB
ANION GAP SERPL CALC-SCNC: 14 MMOL/L (ref 7–16)
BUN SERPL-MCNC: 9 MG/DL (ref 8–22)
CALCIUM SERPL-MCNC: 10 MG/DL (ref 8.5–10.5)
CHLORIDE SERPL-SCNC: 102 MMOL/L (ref 96–112)
CO2 SERPL-SCNC: 24 MMOL/L (ref 20–33)
CREAT SERPL-MCNC: 0.84 MG/DL (ref 0.5–1.4)
ERYTHROCYTE [DISTWIDTH] IN BLOOD BY AUTOMATED COUNT: 40.8 FL (ref 35.9–50)
GLUCOSE SERPL-MCNC: 94 MG/DL (ref 65–99)
HCT VFR BLD AUTO: 40.5 % (ref 37–47)
HGB BLD-MCNC: 14.1 G/DL (ref 12–16)
MCH RBC QN AUTO: 32.1 PG (ref 27–33)
MCHC RBC AUTO-ENTMCNC: 34.8 G/DL (ref 33.6–35)
MCV RBC AUTO: 92.3 FL (ref 81.4–97.8)
PLATELET # BLD AUTO: 338 K/UL (ref 164–446)
PMV BLD AUTO: 10.3 FL (ref 9–12.9)
POTASSIUM SERPL-SCNC: 3.6 MMOL/L (ref 3.6–5.5)
RBC # BLD AUTO: 4.39 M/UL (ref 4.2–5.4)
SODIUM SERPL-SCNC: 140 MMOL/L (ref 135–145)
WBC # BLD AUTO: 5.9 K/UL (ref 4.8–10.8)

## 2022-01-13 PROCEDURE — U0003 INFECTIOUS AGENT DETECTION BY NUCLEIC ACID (DNA OR RNA); SEVERE ACUTE RESPIRATORY SYNDROME CORONAVIRUS 2 (SARS-COV-2) (CORONAVIRUS DISEASE [COVID-19]), AMPLIFIED PROBE TECHNIQUE, MAKING USE OF HIGH THROUGHPUT TECHNOLOGIES AS DESCRIBED BY CMS-2020-01-R: HCPCS

## 2022-01-13 PROCEDURE — C9803 HOPD COVID-19 SPEC COLLECT: HCPCS

## 2022-01-13 PROCEDURE — 85027 COMPLETE CBC AUTOMATED: CPT

## 2022-01-13 PROCEDURE — 80048 BASIC METABOLIC PNL TOTAL CA: CPT

## 2022-01-13 PROCEDURE — 36415 COLL VENOUS BLD VENIPUNCTURE: CPT

## 2022-01-13 PROCEDURE — U0005 INFEC AGEN DETEC AMPLI PROBE: HCPCS

## 2022-01-13 RX ORDER — MULTIVITAMIN/IRON/FOLIC ACID 18MG-0.4MG
1 TABLET ORAL DAILY
COMMUNITY

## 2022-01-13 RX ORDER — SEROTONIN HCL 97 %
POWDER (GRAM) MISCELLANEOUS
COMMUNITY
End: 2022-09-07

## 2022-01-13 RX ORDER — MULTIVIT WITH MINERALS/LUTEIN
2 TABLET ORAL DAILY
COMMUNITY
End: 2022-12-14

## 2022-01-13 RX ORDER — LETROZOLE 2.5 MG/1
2.5 TABLET, FILM COATED ORAL DAILY
COMMUNITY
End: 2022-12-14

## 2022-01-13 RX ORDER — VITAMIN B COMPLEX
3000 TABLET ORAL DAILY
COMMUNITY

## 2022-01-13 RX ORDER — PHENOL 1.4 %
AEROSOL, SPRAY (ML) MUCOUS MEMBRANE
COMMUNITY

## 2022-01-13 RX ORDER — DIPHENHYDRAMINE HCL 25 MG
25-50 TABLET ORAL EVERY 6 HOURS PRN
COMMUNITY

## 2022-01-13 ASSESSMENT — FIBROSIS 4 INDEX: FIB4 SCORE: 0.6

## 2022-01-14 LAB
SARS-COV-2 RNA RESP QL NAA+PROBE: NOTDETECTED
SPECIMEN SOURCE: NORMAL

## 2022-01-18 ENCOUNTER — ANESTHESIA EVENT (OUTPATIENT)
Dept: SURGERY | Facility: MEDICAL CENTER | Age: 47
End: 2022-01-18
Payer: MEDICAID

## 2022-01-19 ENCOUNTER — ANESTHESIA (OUTPATIENT)
Dept: SURGERY | Facility: MEDICAL CENTER | Age: 47
End: 2022-01-19
Payer: MEDICAID

## 2022-01-19 ENCOUNTER — HOSPITAL ENCOUNTER (OUTPATIENT)
Facility: MEDICAL CENTER | Age: 47
End: 2022-01-19
Attending: SURGERY | Admitting: SURGERY
Payer: MEDICAID

## 2022-01-19 VITALS
HEIGHT: 65 IN | TEMPERATURE: 98.2 F | HEART RATE: 73 BPM | DIASTOLIC BLOOD PRESSURE: 60 MMHG | BODY MASS INDEX: 28.58 KG/M2 | RESPIRATION RATE: 16 BRPM | WEIGHT: 171.52 LBS | OXYGEN SATURATION: 94 % | SYSTOLIC BLOOD PRESSURE: 95 MMHG

## 2022-01-19 DIAGNOSIS — G89.18 POSTOPERATIVE PAIN: ICD-10-CM

## 2022-01-19 LAB
HCG UR QL: NEGATIVE
PATHOLOGY CONSULT NOTE: NORMAL

## 2022-01-19 PROCEDURE — 88307 TISSUE EXAM BY PATHOLOGIST: CPT

## 2022-01-19 PROCEDURE — 160041 HCHG SURGERY MINUTES - EA ADDL 1 MIN LEVEL 4: Performed by: SURGERY

## 2022-01-19 PROCEDURE — 160025 RECOVERY II MINUTES (STATS): Performed by: SURGERY

## 2022-01-19 PROCEDURE — 700105 HCHG RX REV CODE 258: Performed by: SURGERY

## 2022-01-19 PROCEDURE — 160047 HCHG PACU  - EA ADDL 30 MINS PHASE II: Performed by: SURGERY

## 2022-01-19 PROCEDURE — 160009 HCHG ANES TIME/MIN: Performed by: SURGERY

## 2022-01-19 PROCEDURE — 700111 HCHG RX REV CODE 636 W/ 250 OVERRIDE (IP): Performed by: INTERNAL MEDICINE

## 2022-01-19 PROCEDURE — 160029 HCHG SURGERY MINUTES - 1ST 30 MINS LEVEL 4: Performed by: SURGERY

## 2022-01-19 PROCEDURE — 501838 HCHG SUTURE GENERAL: Performed by: SURGERY

## 2022-01-19 PROCEDURE — 160035 HCHG PACU - 1ST 60 MINS PHASE I: Performed by: SURGERY

## 2022-01-19 PROCEDURE — 160048 HCHG OR STATISTICAL LEVEL 1-5: Performed by: SURGERY

## 2022-01-19 PROCEDURE — 160036 HCHG PACU - EA ADDL 30 MINS PHASE I: Performed by: SURGERY

## 2022-01-19 PROCEDURE — 81025 URINE PREGNANCY TEST: CPT

## 2022-01-19 PROCEDURE — 160002 HCHG RECOVERY MINUTES (STAT): Performed by: SURGERY

## 2022-01-19 PROCEDURE — 700101 HCHG RX REV CODE 250: Performed by: INTERNAL MEDICINE

## 2022-01-19 PROCEDURE — 700102 HCHG RX REV CODE 250 W/ 637 OVERRIDE(OP): Performed by: INTERNAL MEDICINE

## 2022-01-19 PROCEDURE — 160046 HCHG PACU - 1ST 60 MINS PHASE II: Performed by: SURGERY

## 2022-01-19 PROCEDURE — A9270 NON-COVERED ITEM OR SERVICE: HCPCS | Performed by: INTERNAL MEDICINE

## 2022-01-19 RX ORDER — HYDROMORPHONE HYDROCHLORIDE 1 MG/ML
0.1 INJECTION, SOLUTION INTRAMUSCULAR; INTRAVENOUS; SUBCUTANEOUS
Status: DISCONTINUED | OUTPATIENT
Start: 2022-01-19 | End: 2022-01-19 | Stop reason: HOSPADM

## 2022-01-19 RX ORDER — OXYCODONE HCL 5 MG/5 ML
10 SOLUTION, ORAL ORAL
Status: COMPLETED | OUTPATIENT
Start: 2022-01-19 | End: 2022-01-19

## 2022-01-19 RX ORDER — REMIFENTANIL HYDROCHLORIDE 1 MG/ML
INJECTION, POWDER, LYOPHILIZED, FOR SOLUTION INTRAVENOUS
Status: DISCONTINUED | OUTPATIENT
Start: 2022-01-19 | End: 2022-01-19 | Stop reason: SURG

## 2022-01-19 RX ORDER — DEXAMETHASONE SODIUM PHOSPHATE 4 MG/ML
INJECTION, SOLUTION INTRA-ARTICULAR; INTRALESIONAL; INTRAMUSCULAR; INTRAVENOUS; SOFT TISSUE PRN
Status: DISCONTINUED | OUTPATIENT
Start: 2022-01-19 | End: 2022-01-19 | Stop reason: SURG

## 2022-01-19 RX ORDER — HYDROCODONE BITARTRATE AND ACETAMINOPHEN 5; 325 MG/1; MG/1
1 TABLET ORAL EVERY 4 HOURS PRN
Qty: 20 TABLET | Refills: 0 | Status: SHIPPED | OUTPATIENT
Start: 2022-01-19 | End: 2022-01-26

## 2022-01-19 RX ORDER — HYDROMORPHONE HYDROCHLORIDE 1 MG/ML
0.2 INJECTION, SOLUTION INTRAMUSCULAR; INTRAVENOUS; SUBCUTANEOUS
Status: DISCONTINUED | OUTPATIENT
Start: 2022-01-19 | End: 2022-01-19 | Stop reason: HOSPADM

## 2022-01-19 RX ORDER — LABETALOL HYDROCHLORIDE 5 MG/ML
5 INJECTION, SOLUTION INTRAVENOUS
Status: DISCONTINUED | OUTPATIENT
Start: 2022-01-19 | End: 2022-01-19 | Stop reason: HOSPADM

## 2022-01-19 RX ORDER — ACETAMINOPHEN 10 MG/ML
1000 INJECTION, SOLUTION INTRAVENOUS ONCE
Status: COMPLETED | OUTPATIENT
Start: 2022-01-19 | End: 2022-01-19

## 2022-01-19 RX ORDER — OXYCODONE HCL 5 MG/5 ML
5 SOLUTION, ORAL ORAL
Status: COMPLETED | OUTPATIENT
Start: 2022-01-19 | End: 2022-01-19

## 2022-01-19 RX ORDER — DIPHENHYDRAMINE HYDROCHLORIDE 50 MG/ML
12.5 INJECTION INTRAMUSCULAR; INTRAVENOUS
Status: DISCONTINUED | OUTPATIENT
Start: 2022-01-19 | End: 2022-01-19 | Stop reason: HOSPADM

## 2022-01-19 RX ORDER — SUCCINYLCHOLINE CHLORIDE 20 MG/ML
INJECTION INTRAMUSCULAR; INTRAVENOUS PRN
Status: DISCONTINUED | OUTPATIENT
Start: 2022-01-19 | End: 2022-01-19 | Stop reason: SURG

## 2022-01-19 RX ORDER — EXEMESTANE 25 MG/1
25 TABLET ORAL DAILY
COMMUNITY
Start: 2021-11-15 | End: 2022-03-15

## 2022-01-19 RX ORDER — HYDRALAZINE HYDROCHLORIDE 20 MG/ML
5 INJECTION INTRAMUSCULAR; INTRAVENOUS
Status: DISCONTINUED | OUTPATIENT
Start: 2022-01-19 | End: 2022-01-19 | Stop reason: HOSPADM

## 2022-01-19 RX ORDER — MEPERIDINE HYDROCHLORIDE 25 MG/ML
12.5 INJECTION INTRAMUSCULAR; INTRAVENOUS; SUBCUTANEOUS
Status: DISCONTINUED | OUTPATIENT
Start: 2022-01-19 | End: 2022-01-19 | Stop reason: HOSPADM

## 2022-01-19 RX ORDER — ONDANSETRON 2 MG/ML
INJECTION INTRAMUSCULAR; INTRAVENOUS PRN
Status: DISCONTINUED | OUTPATIENT
Start: 2022-01-19 | End: 2022-01-19 | Stop reason: SURG

## 2022-01-19 RX ORDER — ONDANSETRON 2 MG/ML
4 INJECTION INTRAMUSCULAR; INTRAVENOUS
Status: COMPLETED | OUTPATIENT
Start: 2022-01-19 | End: 2022-01-19

## 2022-01-19 RX ORDER — LIDOCAINE HYDROCHLORIDE 20 MG/ML
INJECTION, SOLUTION EPIDURAL; INFILTRATION; INTRACAUDAL; PERINEURAL PRN
Status: DISCONTINUED | OUTPATIENT
Start: 2022-01-19 | End: 2022-01-19 | Stop reason: SURG

## 2022-01-19 RX ORDER — HALOPERIDOL 5 MG/ML
1 INJECTION INTRAMUSCULAR
Status: DISCONTINUED | OUTPATIENT
Start: 2022-01-19 | End: 2022-01-19 | Stop reason: HOSPADM

## 2022-01-19 RX ORDER — SODIUM CHLORIDE, SODIUM LACTATE, POTASSIUM CHLORIDE, CALCIUM CHLORIDE 600; 310; 30; 20 MG/100ML; MG/100ML; MG/100ML; MG/100ML
INJECTION, SOLUTION INTRAVENOUS CONTINUOUS
Status: ACTIVE | OUTPATIENT
Start: 2022-01-19 | End: 2022-01-19

## 2022-01-19 RX ORDER — HYDROMORPHONE HYDROCHLORIDE 1 MG/ML
0.4 INJECTION, SOLUTION INTRAMUSCULAR; INTRAVENOUS; SUBCUTANEOUS
Status: DISCONTINUED | OUTPATIENT
Start: 2022-01-19 | End: 2022-01-19 | Stop reason: HOSPADM

## 2022-01-19 RX ORDER — SCOLOPAMINE TRANSDERMAL SYSTEM 1 MG/1
1 PATCH, EXTENDED RELEASE TRANSDERMAL
Status: DISCONTINUED | OUTPATIENT
Start: 2022-01-19 | End: 2022-01-19 | Stop reason: HOSPADM

## 2022-01-19 RX ADMIN — FENTANYL CITRATE 100 MCG: 50 INJECTION, SOLUTION INTRAMUSCULAR; INTRAVENOUS at 08:50

## 2022-01-19 RX ADMIN — HYDROMORPHONE HYDROCHLORIDE 0.2 MG: 1 INJECTION, SOLUTION INTRAMUSCULAR; INTRAVENOUS; SUBCUTANEOUS at 10:34

## 2022-01-19 RX ADMIN — ONDANSETRON 4 MG: 2 INJECTION INTRAMUSCULAR; INTRAVENOUS at 09:56

## 2022-01-19 RX ADMIN — FENTANYL CITRATE 50 MCG: 50 INJECTION, SOLUTION INTRAMUSCULAR; INTRAVENOUS at 10:10

## 2022-01-19 RX ADMIN — HYDROMORPHONE HYDROCHLORIDE 0.4 MG: 1 INJECTION, SOLUTION INTRAMUSCULAR; INTRAVENOUS; SUBCUTANEOUS at 11:00

## 2022-01-19 RX ADMIN — LIDOCAINE HYDROCHLORIDE 70 MG: 20 INJECTION, SOLUTION EPIDURAL; INFILTRATION; INTRACAUDAL at 08:55

## 2022-01-19 RX ADMIN — FENTANYL CITRATE 50 MCG: 50 INJECTION, SOLUTION INTRAMUSCULAR; INTRAVENOUS at 08:55

## 2022-01-19 RX ADMIN — HYDROMORPHONE HYDROCHLORIDE 0.2 MG: 1 INJECTION, SOLUTION INTRAMUSCULAR; INTRAVENOUS; SUBCUTANEOUS at 10:19

## 2022-01-19 RX ADMIN — HYDROMORPHONE HYDROCHLORIDE 0.2 MG: 1 INJECTION, SOLUTION INTRAMUSCULAR; INTRAVENOUS; SUBCUTANEOUS at 10:55

## 2022-01-19 RX ADMIN — ONDANSETRON 4 MG: 2 INJECTION INTRAMUSCULAR; INTRAVENOUS at 09:35

## 2022-01-19 RX ADMIN — ACETAMINOPHEN 1000 MG: 10 INJECTION, SOLUTION INTRAVENOUS at 11:08

## 2022-01-19 RX ADMIN — SODIUM CHLORIDE, POTASSIUM CHLORIDE, SODIUM LACTATE AND CALCIUM CHLORIDE: 600; 310; 30; 20 INJECTION, SOLUTION INTRAVENOUS at 08:01

## 2022-01-19 RX ADMIN — SCOPALAMINE 1 PATCH: 1 PATCH, EXTENDED RELEASE TRANSDERMAL at 08:01

## 2022-01-19 RX ADMIN — REMIFENTANIL HYDROCHLORIDE 0.2 MCG/KG/MIN: 1 INJECTION, POWDER, LYOPHILIZED, FOR SOLUTION INTRAVENOUS at 08:55

## 2022-01-19 RX ADMIN — SUCCINYLCHOLINE CHLORIDE 80 MG: 20 INJECTION, SOLUTION INTRAMUSCULAR; INTRAVENOUS; PARENTERAL at 08:55

## 2022-01-19 RX ADMIN — HYDROMORPHONE HYDROCHLORIDE 0.4 MG: 1 INJECTION, SOLUTION INTRAMUSCULAR; INTRAVENOUS; SUBCUTANEOUS at 10:47

## 2022-01-19 RX ADMIN — FENTANYL CITRATE 100 MCG: 50 INJECTION, SOLUTION INTRAMUSCULAR; INTRAVENOUS at 09:44

## 2022-01-19 RX ADMIN — DEXAMETHASONE SODIUM PHOSPHATE 4 MG: 4 INJECTION, SOLUTION INTRA-ARTICULAR; INTRALESIONAL; INTRAMUSCULAR; INTRAVENOUS; SOFT TISSUE at 08:55

## 2022-01-19 RX ADMIN — FENTANYL CITRATE 50 MCG: 50 INJECTION, SOLUTION INTRAMUSCULAR; INTRAVENOUS at 10:01

## 2022-01-19 RX ADMIN — OXYCODONE HYDROCHLORIDE 10 MG: 5 SOLUTION ORAL at 09:55

## 2022-01-19 RX ADMIN — PROPOFOL 150 MG: 10 INJECTION, EMULSION INTRAVENOUS at 08:55

## 2022-01-19 ASSESSMENT — PAIN DESCRIPTION - PAIN TYPE
TYPE: SURGICAL PAIN

## 2022-01-19 ASSESSMENT — FIBROSIS 4 INDEX: FIB4 SCORE: 0.6

## 2022-01-19 NOTE — ANESTHESIA PREPROCEDURE EVALUATION
Case: [id number] Date/Time: 01/19/22 0845    Procedure: LOBECTOMY, THYROID (Left )    Pre-op diagnosis: THYROID NODULE    Location: [facility] ROOM 23 / SURGERY SAME DAY [facility]    Surgeons: Aspen Woodruff M.D.          Relevant Problems   Other   (positive) Malignant neoplasm of overlapping sites of right breast in female, estrogen receptor positive (HCC)       Physical Exam    Airway   Mallampati: II  TM distance: >3 FB  Neck ROM: full       Cardiovascular - normal exam  Rhythm: regular  Rate: normal  (-) murmur     Dental - normal exam           Pulmonary - normal exam  Breath sounds clear to auscultation     Abdominal    Neurological - normal exam                 Anesthesia Plan    ASA 2       Plan - general       Airway plan will be ETT    (Monitored ETT)      Induction: intravenous    Postoperative Plan: Postoperative administration of opioids is intended.    Pertinent diagnostic labs and testing reviewed    Informed Consent:    Anesthetic plan and risks discussed with patient.    Use of blood products discussed with: patient whom consented to blood products.

## 2022-01-19 NOTE — ANESTHESIA PROCEDURE NOTES
Airway    Date/Time: 1/19/2022 8:56 AM  Performed by: Hollis Kirkland M.D.  Authorized by: Hollis Kirkland M.D.     Location:  OR  Urgency:  Elective  Indications for Airway Management:  Anesthesia      Spontaneous Ventilation: absent    Sedation Level:  Deep  Preoxygenated: Yes    Patient Position:  Sniffing  Mask Difficulty Assessment:  1 - vent by mask  Final Airway Type:  Endotracheal airway  Final Endotracheal Airway:  ETT and NIM tube  Cuffed: Yes    Technique Used for Successful ETT Placement:  Direct laryngoscopy    Insertion Site:  Oral  Blade Type:  Thomas  Laryngoscope Blade/Videolaryngoscope Blade Size:  3  ETT Size (mm):  7.0  Measured from:  Teeth  ETT to Teeth (cm):  22  Placement Verified by: auscultation and capnometry    Cormack-Lehane Classification:  Grade I - full view of glottis  Number of Attempts at Approach:  1

## 2022-01-19 NOTE — ANESTHESIA POSTPROCEDURE EVALUATION
Patient: Deisy Galo    Procedure Summary     Date: 01/19/22 Room / Location: [facility] ROOM 23 / SURGERY SAME DAY [facility]    Anesthesia Start: 0850 Anesthesia Stop: 0951    Procedure: LOBECTOMY, THYROID (Left Neck) Diagnosis: (NON TOXIC SINGLE THYROID NODULE Afrima suspicious)    Surgeons: Aspen Woodruff M.D. Responsible Provider: Hollis Kirkland M.D.    Anesthesia Type: general ASA Status: 2          Final Anesthesia Type: general  Last vitals  BP   Blood Pressure: 103/63    Temp   37.6 °C (99.6 °F)    Pulse   81   Resp   14    SpO2   94 %      Anesthesia Post Evaluation    Patient location during evaluation: PACU  Patient participation: complete - patient participated  Level of consciousness: awake and alert    Airway patency: patent  Anesthetic complications: no  Cardiovascular status: hemodynamically stable  Respiratory status: acceptable  Hydration status: euvolemic    PONV: none          No complications documented.     Nurse Pain Score: 5 (NPRS)

## 2022-01-19 NOTE — OR NURSING
1200 discharge instructions reviewed with patients mother.  Questions answered.    1205:  Pt off O2 and sats dip to 85.  Reminded patient to take deep breathe and sats increase to 96.  When patient sleeps sats decrease.  plased back on O2 and allowed to rest.  Surgical site without signs of active bleeding.  Soft to palpation without ecchymosis.    1208:  sats dip to 83.  O2 to 1L nasal canula.  Allowed to rest until sats improve.    1220:  Updated patients mother regarding need to rest with O2.  Mother states understanding.    1235: Report to EUNICE Martines

## 2022-01-19 NOTE — PROGRESS NOTES
Discharge instructions after partial thyroidectomy:    You had surgery called a partial thyroidectomy. This means that part of your thyroid gland was removed. The main job of the thyroid gland is to make thyroid hormone. This hormone controls your body’s metabolism. This is the way your body creates and uses energy. Most of the time, after part of your thyroid is removed, the remainder will make up for what has been removed, and you will not need to take thyroid pills. We will check your thyroid function about a month after surgery to determine this.     Recovering After Surgery:    Get plenty of rest.    Incisional care: you have a waterproof dressing in place, OK to shower but do not submerge the incision or dressing. You may remove your dressing after 48 hours. Underneath there will be a piece of tape (called a steri strip) that you can leave in place until it falls off. OK to shower after the waterproof dressing is removed. No other specific care is required for your incision.    Avoid heavy lifting and strenuous activity for 3-5 weeks.    Walk a few times daily - but don’t push yourself too hard. Slowly increase your pace and distance, as you feel able.    Return to work when your doctor says it’s okay, typically after 7 days.    Keeping Your Doctor’s Appointments:    See your doctor for regular visits. These are needed to monitor your health and your thyroid levels.    Have routine blood tests done. These check the level of thyroid hormone in your body. This helps your doctor know whether to start or adjust the dosage of your medication if needed.    When to Call Your Doctor:  Call your doctor right away if you have any of the following:  Fever above 100.4°F  Swelling or bleeding at the incision site  Choking  Trouble breathing  A sore throat that lasts longer than 7 days  Tingling or cramps in your hands, feet, or lips    FOLLOW-UP:  ? Please call my office at [phone] to make an appointment in 1  week    Office address:  [address] [place] [address] [id number]  [place] [address]      Aspen Woodruff M.D.  [facility]  [phone]

## 2022-01-19 NOTE — ANESTHESIA TIME REPORT
Anesthesia Start and Stop Event Times     Date Time Event    1/19/2022 0845 Ready for Procedure     0850 Anesthesia Start     0951 Anesthesia Stop        Responsible Staff  01/19/22    Name Role Begin End    Hollis Kirkland M.D. Anesth 0850 0951        Preop Diagnosis (Free Text):  Pre-op Diagnosis     NON TOXIC SINGLE THYROID NODULE Afrima suspicious        Preop Diagnosis (Codes):    Premium Reason  Non-Premium    Comments:

## 2022-01-19 NOTE — OP REPORT
Operative Report    Date: 1/19/2022    Surgeon: Aspen Woodruff M.D.    Assistant: Jayant SANTIAGO    My assistant, Jayant Bailey, was medically necessary for this procedure. He placed the precordial electrodes of the NIMS and monitored the left recurrent laryngeal nerve throughout the procedure. He also by retracting tissues allowed me the surgical field visualization necessary for a safe surgery. Finally, he also assisted with hemostasis and wound closure.     Anesthesiologist: Marita SNELL    Pre-operative Diagnosis: left thyroid mass (E04.1 Nontoxic single thyroid nodule), AFIRMA suspicious    Post-operative Diagnosis: same     Procedure: left thyroid lobectomy and isthmusectomy, unilateral recurrent laryngeal nerve monitoring (61613 Total thyroid lobectomy, unilateral; with or without isthmusectomy )    Procedure in detail: The patient was identified in the pre-operative holding area and brought to the operating room. Correct side and site were identified.  GETA was smoothly induced. The patient was prepped and draped in the usual sterile fashion.    With the patient in the supine position, the head of the bed slightly elevated, the neck slightly extended, and the patient intubated with a NIM endotracheal tube, the precordial electrodes were placed by the surgical assistant, who then monitored the left recurrent laryngeal nerve throughout the procedure.     The neck was prepared with betadiene and draped in the usual fashion. The neck was entered through a lower transverse cervical incision and carried down through the platysma. Subplatysmal flaps were dissected superiorly and inferiorly down to the sternal notch. The midline cervical fascia was incised down to the capsule of the thyroid. The strap muscles were mobilized off the left thyroid lobe, and the superior pole vessels progressively divided with the Harmonic. There was a large soft nodule replacing the left thyroid lobe. The thyroid veins were divided with the  Harmonic. The recurrent laryngeal nerve and both parathyroids were identified and protected. Branches of the inferior thyroid artery were divided on the capsule of the gland with the Harmonic. Smaller vessels were either divided with the Harmonic or, when near to the recurrent nerve, divided between clamps and suture ligated with 3-0 Vicryl suture as the gland was dissected free from the nerve and off the trachea. The gland was transected at the medial border of the righg thyroid lobe and the left thyroid lobe and the isthmus were sent for permanent pathologic exam.    Good hemostasis was assured.     The integrity of the left recurrent laryngeal nerve was documented. Small pieces of Surgicel Fibrillar were placed in in the left side of the neck. The midline cervical fascia was reapproximated with running 3-0 Vicryl. Platysma was reapproximated with interrupted 3-0 Vicryl. The skin was closed with monocryl.    The patient was awakened from general anesthetic, and was taken to the recovery room in stable condition.    Sponge and needle counts were correct at the end of the case.     Specimen: left thyroid lobe and isthmus    EBL: 25 mL    Dispo: stable, extubated, to PACU    Aspen Woodruff M.D.  [facility]  [phone]

## 2022-01-19 NOTE — OR NURSING
0947 Pt to PACU from OR. Report from anesthesia and OR RN. On 6L mask O2, patent airway. Awake on arrival to PACU. Respirations even and unlabored. VSS. Dressing anterior neck CDI. Complains of pain, medicated by anesthesia.     1000 Medicated for 9/10 surgical/throat pain. States minimal nausea, medicated also per MAR.    1010 No changed in pain, medicated again per MAR.    1014 Desats on RA after pain medication admin, placed back on 4L mask O2.     1019 Pain still severe, medicated per MAR.     1116 IV Tylenol now infusing, now reports pain is beginning to decrease, 7/10. Responds to voice, RR still 12 per min, although occasionally has mild apnea, still maintains sats 6 L on simple mask, discussed plan to decrease use of IV narcotics and monitor for IV tylenol effect. Decreasing O2 needs as well as IV intervention, meets phase II criteria.     1132 Weaning O2, down to 1L NC. Pain now 5/10 and tolerable per pt. Updated on plan to get dressed once O2 dc'd, then will call mother for .     1154 Report to Rosalind RN.     1403 Still occasionally desats on RA. Dressed, sitting up in recliner, working on IS. Will walk around department if needed and see for effect on O2.     1430 Encouraging walks, doing well practicing IS. Changed O2 probe to  pulse ox on ear, now reads % on RA, if sats maintain, pt may dc in half an hour.     1500 Still maintains sats on RA, has a rare dip to mid-high 80s when asleep, self corrects with deep breath brining sats back into high 90s.    1525 patient discharged via wheelchair with RN.  Taken to responsible adult for transportation home.  All belongings accounted for. Given IS with instructions to use 5-10x/hr while awake and encouraged to walk short distances at home, discussed not to take narcotic medication if feeling too drowsy, pt and family verbalize understanding of instructions.

## 2022-01-19 NOTE — DISCHARGE INSTRUCTIONS
ACTIVITY: Rest and take it easy for the first 24 hours.  A responsible adult is recommended to remain with you during that time.  It is normal to feel sleepy.  We encourage you to not do anything that requires balance, judgment or coordination.    MILD FLU-LIKE SYMPTOMS ARE NORMAL. YOU MAY EXPERIENCE GENERALIZED MUSCLE ACHES, THROAT IRRITATION, HEADACHE AND/OR SOME NAUSEA.    FOR 24 HOURS DO NOT:  Drive, operate machinery or run household appliances.  Drink beer or alcoholic beverages.   Make important decisions or sign legal documents.    SPECIAL INSTRUCTIONS:     Discharge instructions after partial thyroidectomy:     You had surgery called a partial thyroidectomy. This means that part of your thyroid gland was removed. The main job of the thyroid gland is to make thyroid hormone. This hormone controls your body’s metabolism. This is the way your body creates and uses energy. Most of the time, after part of your thyroid is removed, the remainder will make up for what has been removed, and you will not need to take thyroid pills. We will check your thyroid function about a month after surgery to determine this.      Recovering After Surgery:     Get plenty of rest.     Incisional care: you have a waterproof dressing in place, OK to shower but do not submerge the incision or dressing. You may remove your dressing after 48 hours. Underneath there will be a piece of tape (called a steri strip) that you can leave in place until it falls off. OK to shower after the waterproof dressing is removed. No other specific care is required for your incision.     Avoid heavy lifting and strenuous activity for 3-5 weeks.     Walk a few times daily - but don’t push yourself too hard. Slowly increase your pace and distance, as you feel able.     Return to work when your doctor says it’s okay, typically after 7 days.     Keeping Your Doctor’s Appointments:     See your doctor for regular visits. These are needed to monitor your  health and your thyroid levels.     Have routine blood tests done. These check the level of thyroid hormone in your body. This helps your doctor know whether to start or adjust the dosage of your medication if needed.     When to Call Your Doctor:  Call your doctor right away if you have any of the following:  Fever above 100.4°F  Swelling or bleeding at the incision site  Choking  Trouble breathing  A sore throat that lasts longer than 7 days  Tingling or cramps in your hands, feet, or lips     FOLLOW-UP:  · Please call my office at [phone] to make an appointment in 1 week     Office address:  [address] [place] [address] [id number]  [place] [address]    DIET: To avoid nausea, slowly advance diet as tolerated, avoiding spicy or greasy foods for the first day.  Add more substantial food to your diet according to your physician's instructions. INCREASE FLUIDS AND FIBER TO AVOID CONSTIPATION.    You should CALL YOUR PHYSICIAN if you develop:  Fever greater than 101 degrees F.  Pain not relieved by medication, or persistent nausea or vomiting.  Excessive bleeding (blood soaking through dressing) or unexpected drainage from the wound.  Extreme redness or swelling around the incision site, drainage of pus or foul smelling drainage.  Inability to urinate or empty your bladder within 8 hours.  Problems with breathing or chest pain.    You should call [phone] if you develop problems with breathing or chest pain.  If you are unable to contact your doctor or surgical center, you should go to the nearest emergency room or urgent care center.      If any questions arise, call your doctor.  If your doctor is not available, please feel free to call the Surgical Center at [phone].     A registered nurse may call you a few days after your surgery to see how you are doing after your procedure.    MEDICATIONS: Resume taking daily medication.  Take prescribed pain medication with food.  If no medication is prescribed, you may take non-aspirin  pain medication if needed.  PAIN MEDICATION CAN BE VERY CONSTIPATING.  Take a stool softener or laxative such as senokot, pericolace, or milk of magnesia if needed.    Prescription for Norco electronically prescribed.  Last pain medication given at 9:56 am (Oxycodone 10 mg), wait until 2pm to start your home pain medicine.    If your physician has prescribed pain medication that includes Acetaminophen (Tylenol), do not take additional Acetaminophen (Tylenol) while taking the prescribed medication.    Depression / Suicide Risk    As you are discharged from this [facility] facility, it is important to learn how to keep safe from harming yourself.    Recognize the warning signs:  · Abrupt changes in personality, positive or negative- including increase in energy   · Giving away possessions  · Change in eating patterns- significant weight changes-  positive or negative  · Change in sleeping patterns- unable to sleep or sleeping all the time   · Unwillingness or inability to communicate  · Depression  · Unusual sadness, discouragement and loneliness  · Talk of wanting to die  · Neglect of personal appearance   · Rebelliousness- reckless behavior  · Withdrawal from people/activities they love  · Confusion- inability to concentrate     If you or a loved one observes any of these behaviors or has concerns about self-harm, here's what you can do:  · Talk about it- your feelings and reasons for harming yourself  · Remove any means that you might use to hurt yourself (examples: pills, rope, extension cords, firearm)  · Get professional help from the community (Mental Health, Substance Abuse, psychological counseling)  · Do not be alone:Call your Safe Contact- someone whom you trust who will be there for you.  · Call your local CRISIS HOTLINE 042-6805 or 648-395-2015  · Call your local Children's Mobile Crisis Response Team [place] (551) 641-7613 or www.[employer]  · Call the toll free National Suicide Prevention  Hotlines   · National Suicide Prevention Lifeline 469-244-NYQL (3337)  · National Hope Line Network 800-SUICIDE (938-2714)

## 2022-02-18 ENCOUNTER — HOSPITAL ENCOUNTER (OUTPATIENT)
Dept: LAB | Facility: MEDICAL CENTER | Age: 47
End: 2022-02-18
Attending: SURGERY
Payer: MEDICAID

## 2022-02-18 ENCOUNTER — HOSPITAL ENCOUNTER (OUTPATIENT)
Dept: LAB | Facility: MEDICAL CENTER | Age: 47
End: 2022-02-18
Attending: STUDENT IN AN ORGANIZED HEALTH CARE EDUCATION/TRAINING PROGRAM
Payer: MEDICAID

## 2022-02-18 LAB
ALBUMIN SERPL BCP-MCNC: 4.6 G/DL (ref 3.2–4.9)
ALBUMIN/GLOB SERPL: 1.8 G/DL
ALP SERPL-CCNC: 76 U/L (ref 30–99)
ALT SERPL-CCNC: 12 U/L (ref 2–50)
ANION GAP SERPL CALC-SCNC: 15 MMOL/L (ref 7–16)
AST SERPL-CCNC: 18 U/L (ref 12–45)
BASOPHILS # BLD AUTO: 0.7 % (ref 0–1.8)
BASOPHILS # BLD: 0.03 K/UL (ref 0–0.12)
BILIRUB SERPL-MCNC: 0.5 MG/DL (ref 0.1–1.5)
BUN SERPL-MCNC: 11 MG/DL (ref 8–22)
CALCIUM SERPL-MCNC: 9.6 MG/DL (ref 8.5–10.5)
CHLORIDE SERPL-SCNC: 102 MMOL/L (ref 96–112)
CHOLEST SERPL-MCNC: 218 MG/DL (ref 100–199)
CO2 SERPL-SCNC: 24 MMOL/L (ref 20–33)
CREAT SERPL-MCNC: 0.61 MG/DL (ref 0.5–1.4)
EOSINOPHIL # BLD AUTO: 0.28 K/UL (ref 0–0.51)
EOSINOPHIL NFR BLD: 6.6 % (ref 0–6.9)
ERYTHROCYTE [DISTWIDTH] IN BLOOD BY AUTOMATED COUNT: 43.6 FL (ref 35.9–50)
FASTING STATUS PATIENT QL REPORTED: NORMAL
GLOBULIN SER CALC-MCNC: 2.5 G/DL (ref 1.9–3.5)
GLUCOSE SERPL-MCNC: 87 MG/DL (ref 65–99)
HCT VFR BLD AUTO: 41.3 % (ref 37–47)
HDLC SERPL-MCNC: 70 MG/DL
HGB BLD-MCNC: 14.2 G/DL (ref 12–16)
IMM GRANULOCYTES # BLD AUTO: 0.01 K/UL (ref 0–0.11)
IMM GRANULOCYTES NFR BLD AUTO: 0.2 % (ref 0–0.9)
LDLC SERPL CALC-MCNC: 136 MG/DL
LYMPHOCYTES # BLD AUTO: 1.2 K/UL (ref 1–4.8)
LYMPHOCYTES NFR BLD: 28.2 % (ref 22–41)
MCH RBC QN AUTO: 32 PG (ref 27–33)
MCHC RBC AUTO-ENTMCNC: 34.4 G/DL (ref 33.6–35)
MCV RBC AUTO: 93 FL (ref 81.4–97.8)
MONOCYTES # BLD AUTO: 0.44 K/UL (ref 0–0.85)
MONOCYTES NFR BLD AUTO: 10.3 % (ref 0–13.4)
NEUTROPHILS # BLD AUTO: 2.3 K/UL (ref 2–7.15)
NEUTROPHILS NFR BLD: 54 % (ref 44–72)
NRBC # BLD AUTO: 0 K/UL
NRBC BLD-RTO: 0 /100 WBC
PLATELET # BLD AUTO: 324 K/UL (ref 164–446)
PMV BLD AUTO: 10.5 FL (ref 9–12.9)
POTASSIUM SERPL-SCNC: 4 MMOL/L (ref 3.6–5.5)
PROT SERPL-MCNC: 7.1 G/DL (ref 6–8.2)
RBC # BLD AUTO: 4.44 M/UL (ref 4.2–5.4)
SODIUM SERPL-SCNC: 141 MMOL/L (ref 135–145)
TRIGL SERPL-MCNC: 62 MG/DL (ref 0–149)
TSH SERPL DL<=0.005 MIU/L-ACNC: 1.97 UIU/ML (ref 0.38–5.33)
WBC # BLD AUTO: 4.3 K/UL (ref 4.8–10.8)

## 2022-02-18 PROCEDURE — 85025 COMPLETE CBC W/AUTO DIFF WBC: CPT

## 2022-02-18 PROCEDURE — 84443 ASSAY THYROID STIM HORMONE: CPT

## 2022-02-18 PROCEDURE — 80061 LIPID PANEL: CPT

## 2022-02-18 PROCEDURE — 80053 COMPREHEN METABOLIC PANEL: CPT

## 2022-02-18 PROCEDURE — 36415 COLL VENOUS BLD VENIPUNCTURE: CPT

## 2022-03-15 ENCOUNTER — PRE-ADMISSION TESTING (OUTPATIENT)
Dept: ADMISSIONS | Facility: MEDICAL CENTER | Age: 47
DRG: 743 | End: 2022-03-15
Attending: OBSTETRICS & GYNECOLOGY
Payer: MEDICAID

## 2022-03-15 DIAGNOSIS — Z01.812 PRE-OPERATIVE LABORATORY EXAMINATION: ICD-10-CM

## 2022-03-15 LAB
ANION GAP SERPL CALC-SCNC: 13 MMOL/L (ref 7–16)
APPEARANCE UR: CLEAR
BASOPHILS # BLD AUTO: 0.5 % (ref 0–1.8)
BASOPHILS # BLD: 0.03 K/UL (ref 0–0.12)
BILIRUB UR QL STRIP.AUTO: NEGATIVE
BUN SERPL-MCNC: 10 MG/DL (ref 8–22)
CALCIUM SERPL-MCNC: 10.2 MG/DL (ref 8.5–10.5)
CHLORIDE SERPL-SCNC: 101 MMOL/L (ref 96–112)
CO2 SERPL-SCNC: 27 MMOL/L (ref 20–33)
COLOR UR: YELLOW
CREAT SERPL-MCNC: 0.65 MG/DL (ref 0.5–1.4)
EOSINOPHIL # BLD AUTO: 0.2 K/UL (ref 0–0.51)
EOSINOPHIL NFR BLD: 3.6 % (ref 0–6.9)
ERYTHROCYTE [DISTWIDTH] IN BLOOD BY AUTOMATED COUNT: 44.4 FL (ref 35.9–50)
GFR SERPLBLD CREATININE-BSD FMLA CKD-EPI: 111 ML/MIN/1.73 M 2
GLUCOSE SERPL-MCNC: 86 MG/DL (ref 65–99)
GLUCOSE UR STRIP.AUTO-MCNC: NEGATIVE MG/DL
HCG SERPL QL: NEGATIVE
HCT VFR BLD AUTO: 42.9 % (ref 37–47)
HGB BLD-MCNC: 14.5 G/DL (ref 12–16)
IMM GRANULOCYTES # BLD AUTO: 0.02 K/UL (ref 0–0.11)
IMM GRANULOCYTES NFR BLD AUTO: 0.4 % (ref 0–0.9)
KETONES UR STRIP.AUTO-MCNC: NEGATIVE MG/DL
LEUKOCYTE ESTERASE UR QL STRIP.AUTO: NEGATIVE
LYMPHOCYTES # BLD AUTO: 1.3 K/UL (ref 1–4.8)
LYMPHOCYTES NFR BLD: 23.3 % (ref 22–41)
MCH RBC QN AUTO: 31.8 PG (ref 27–33)
MCHC RBC AUTO-ENTMCNC: 33.8 G/DL (ref 33.6–35)
MCV RBC AUTO: 94.1 FL (ref 81.4–97.8)
MICRO URNS: NORMAL
MONOCYTES # BLD AUTO: 0.63 K/UL (ref 0–0.85)
MONOCYTES NFR BLD AUTO: 11.3 % (ref 0–13.4)
NEUTROPHILS # BLD AUTO: 3.39 K/UL (ref 2–7.15)
NEUTROPHILS NFR BLD: 60.9 % (ref 44–72)
NITRITE UR QL STRIP.AUTO: NEGATIVE
NRBC # BLD AUTO: 0 K/UL
NRBC BLD-RTO: 0 /100 WBC
PH UR STRIP.AUTO: 7.5 [PH] (ref 5–8)
PLATELET # BLD AUTO: 400 K/UL (ref 164–446)
PMV BLD AUTO: 9.7 FL (ref 9–12.9)
POTASSIUM SERPL-SCNC: 4.1 MMOL/L (ref 3.6–5.5)
PROT UR QL STRIP: NEGATIVE MG/DL
RBC # BLD AUTO: 4.56 M/UL (ref 4.2–5.4)
RBC UR QL AUTO: NEGATIVE
SODIUM SERPL-SCNC: 141 MMOL/L (ref 135–145)
SP GR UR STRIP.AUTO: 1.01
UROBILINOGEN UR STRIP.AUTO-MCNC: 0.2 MG/DL
WBC # BLD AUTO: 5.6 K/UL (ref 4.8–10.8)

## 2022-03-15 PROCEDURE — 81003 URINALYSIS AUTO W/O SCOPE: CPT

## 2022-03-15 PROCEDURE — 80048 BASIC METABOLIC PNL TOTAL CA: CPT

## 2022-03-15 PROCEDURE — 85025 COMPLETE CBC W/AUTO DIFF WBC: CPT

## 2022-03-15 PROCEDURE — 84703 CHORIONIC GONADOTROPIN ASSAY: CPT

## 2022-03-15 PROCEDURE — 36415 COLL VENOUS BLD VENIPUNCTURE: CPT

## 2022-03-15 ASSESSMENT — FIBROSIS 4 INDEX: FIB4 SCORE: 0.71

## 2022-03-29 ENCOUNTER — PRE-ADMISSION TESTING (OUTPATIENT)
Dept: ADMISSIONS | Facility: MEDICAL CENTER | Age: 47
DRG: 743 | End: 2022-03-29
Attending: OBSTETRICS & GYNECOLOGY
Payer: MEDICAID

## 2022-03-29 DIAGNOSIS — Z01.812 PRE-OPERATIVE LABORATORY EXAMINATION: ICD-10-CM

## 2022-03-29 LAB — COVID ORDER STATUS COVID19: NORMAL

## 2022-03-29 PROCEDURE — U0005 INFEC AGEN DETEC AMPLI PROBE: HCPCS

## 2022-03-29 PROCEDURE — U0003 INFECTIOUS AGENT DETECTION BY NUCLEIC ACID (DNA OR RNA); SEVERE ACUTE RESPIRATORY SYNDROME CORONAVIRUS 2 (SARS-COV-2) (CORONAVIRUS DISEASE [COVID-19]), AMPLIFIED PROBE TECHNIQUE, MAKING USE OF HIGH THROUGHPUT TECHNOLOGIES AS DESCRIBED BY CMS-2020-01-R: HCPCS

## 2022-03-30 LAB
SARS-COV-2 RNA RESP QL NAA+PROBE: NOTDETECTED
SPECIMEN SOURCE: NORMAL

## 2022-03-31 ENCOUNTER — ANESTHESIA EVENT (OUTPATIENT)
Dept: SURGERY | Facility: MEDICAL CENTER | Age: 47
DRG: 743 | End: 2022-03-31
Payer: MEDICAID

## 2022-04-01 ENCOUNTER — HOSPITAL ENCOUNTER (INPATIENT)
Facility: MEDICAL CENTER | Age: 47
LOS: 1 days | DRG: 743 | End: 2022-04-01
Attending: OBSTETRICS & GYNECOLOGY | Admitting: OBSTETRICS & GYNECOLOGY
Payer: MEDICAID

## 2022-04-01 ENCOUNTER — ANESTHESIA (OUTPATIENT)
Dept: SURGERY | Facility: MEDICAL CENTER | Age: 47
DRG: 743 | End: 2022-04-01
Payer: MEDICAID

## 2022-04-01 VITALS
TEMPERATURE: 97.1 F | RESPIRATION RATE: 16 BRPM | HEART RATE: 72 BPM | HEIGHT: 64 IN | WEIGHT: 176.15 LBS | SYSTOLIC BLOOD PRESSURE: 100 MMHG | BODY MASS INDEX: 30.07 KG/M2 | OXYGEN SATURATION: 99 % | DIASTOLIC BLOOD PRESSURE: 60 MMHG

## 2022-04-01 DIAGNOSIS — Z01.812 PRE-OPERATIVE LABORATORY EXAMINATION: ICD-10-CM

## 2022-04-01 DIAGNOSIS — G89.18 POST-OPERATIVE PAIN: ICD-10-CM

## 2022-04-01 LAB
HCG UR QL: NEGATIVE
PATHOLOGY CONSULT NOTE: NORMAL

## 2022-04-01 PROCEDURE — 700102 HCHG RX REV CODE 250 W/ 637 OVERRIDE(OP): Performed by: ANESTHESIOLOGY

## 2022-04-01 PROCEDURE — 88307 TISSUE EXAM BY PATHOLOGIST: CPT

## 2022-04-01 PROCEDURE — 0U913ZX DRAINAGE OF LEFT OVARY, PERCUTANEOUS APPROACH, DIAGNOSTIC: ICD-10-PCS | Performed by: OBSTETRICS & GYNECOLOGY

## 2022-04-01 PROCEDURE — 160025 RECOVERY II MINUTES (STATS): Performed by: OBSTETRICS & GYNECOLOGY

## 2022-04-01 PROCEDURE — 0UT24ZZ RESECTION OF BILATERAL OVARIES, PERCUTANEOUS ENDOSCOPIC APPROACH: ICD-10-PCS | Performed by: OBSTETRICS & GYNECOLOGY

## 2022-04-01 PROCEDURE — 00840 ANES IPER PX LOWER ABD NOS: CPT | Performed by: ANESTHESIOLOGY

## 2022-04-01 PROCEDURE — 160039 HCHG SURGERY MINUTES - EA ADDL 1 MIN LEVEL 3: Performed by: OBSTETRICS & GYNECOLOGY

## 2022-04-01 PROCEDURE — 700105 HCHG RX REV CODE 258: Performed by: OBSTETRICS & GYNECOLOGY

## 2022-04-01 PROCEDURE — 700111 HCHG RX REV CODE 636 W/ 250 OVERRIDE (IP): Performed by: ANESTHESIOLOGY

## 2022-04-01 PROCEDURE — A9270 NON-COVERED ITEM OR SERVICE: HCPCS | Performed by: ANESTHESIOLOGY

## 2022-04-01 PROCEDURE — 160028 HCHG SURGERY MINUTES - 1ST 30 MINS LEVEL 3: Performed by: OBSTETRICS & GYNECOLOGY

## 2022-04-01 PROCEDURE — 81025 URINE PREGNANCY TEST: CPT

## 2022-04-01 PROCEDURE — 160009 HCHG ANES TIME/MIN: Performed by: OBSTETRICS & GYNECOLOGY

## 2022-04-01 PROCEDURE — 160035 HCHG PACU - 1ST 60 MINS PHASE I: Performed by: OBSTETRICS & GYNECOLOGY

## 2022-04-01 PROCEDURE — 88305 TISSUE EXAM BY PATHOLOGIST: CPT

## 2022-04-01 PROCEDURE — 501570 HCHG TROCAR, SEPARATOR: Performed by: OBSTETRICS & GYNECOLOGY

## 2022-04-01 PROCEDURE — 500886 HCHG PACK, LAPAROSCOPY: Performed by: OBSTETRICS & GYNECOLOGY

## 2022-04-01 PROCEDURE — 0UT74ZZ RESECTION OF BILATERAL FALLOPIAN TUBES, PERCUTANEOUS ENDOSCOPIC APPROACH: ICD-10-PCS | Performed by: OBSTETRICS & GYNECOLOGY

## 2022-04-01 PROCEDURE — 700101 HCHG RX REV CODE 250: Performed by: OBSTETRICS & GYNECOLOGY

## 2022-04-01 PROCEDURE — 501838 HCHG SUTURE GENERAL: Performed by: OBSTETRICS & GYNECOLOGY

## 2022-04-01 PROCEDURE — 306637 HCHG MISC ORTHO ITEM RC 0274

## 2022-04-01 PROCEDURE — 160047 HCHG PACU  - EA ADDL 30 MINS PHASE II: Performed by: OBSTETRICS & GYNECOLOGY

## 2022-04-01 PROCEDURE — 700101 HCHG RX REV CODE 250: Performed by: ANESTHESIOLOGY

## 2022-04-01 PROCEDURE — 160046 HCHG PACU - 1ST 60 MINS PHASE II: Performed by: OBSTETRICS & GYNECOLOGY

## 2022-04-01 PROCEDURE — 88112 CYTOPATH CELL ENHANCE TECH: CPT

## 2022-04-01 PROCEDURE — 160048 HCHG OR STATISTICAL LEVEL 1-5: Performed by: OBSTETRICS & GYNECOLOGY

## 2022-04-01 PROCEDURE — 160002 HCHG RECOVERY MINUTES (STAT): Performed by: OBSTETRICS & GYNECOLOGY

## 2022-04-01 RX ORDER — MIDAZOLAM HYDROCHLORIDE 1 MG/ML
1 INJECTION INTRAMUSCULAR; INTRAVENOUS
Status: DISCONTINUED | OUTPATIENT
Start: 2022-04-01 | End: 2022-04-01 | Stop reason: HOSPADM

## 2022-04-01 RX ORDER — KETOROLAC TROMETHAMINE 30 MG/ML
INJECTION, SOLUTION INTRAMUSCULAR; INTRAVENOUS PRN
Status: DISCONTINUED | OUTPATIENT
Start: 2022-04-01 | End: 2022-04-01 | Stop reason: SURG

## 2022-04-01 RX ORDER — HYDROMORPHONE HYDROCHLORIDE 1 MG/ML
0.2 INJECTION, SOLUTION INTRAMUSCULAR; INTRAVENOUS; SUBCUTANEOUS
Status: DISCONTINUED | OUTPATIENT
Start: 2022-04-01 | End: 2022-04-01 | Stop reason: HOSPADM

## 2022-04-01 RX ORDER — OXYCODONE HCL 5 MG/5 ML
10 SOLUTION, ORAL ORAL
Status: DISCONTINUED | OUTPATIENT
Start: 2022-04-01 | End: 2022-04-01 | Stop reason: HOSPADM

## 2022-04-01 RX ORDER — ONDANSETRON 2 MG/ML
4 INJECTION INTRAMUSCULAR; INTRAVENOUS
Status: COMPLETED | OUTPATIENT
Start: 2022-04-01 | End: 2022-04-01

## 2022-04-01 RX ORDER — SUCCINYLCHOLINE CHLORIDE 20 MG/ML
INJECTION INTRAMUSCULAR; INTRAVENOUS PRN
Status: DISCONTINUED | OUTPATIENT
Start: 2022-04-01 | End: 2022-04-01 | Stop reason: SURG

## 2022-04-01 RX ORDER — IBUPROFEN 600 MG/1
600 TABLET ORAL EVERY 6 HOURS PRN
Qty: 30 TABLET | Refills: 3 | Status: SHIPPED | OUTPATIENT
Start: 2022-04-01

## 2022-04-01 RX ORDER — EPINEPHRINE 1 MG/ML(1)
AMPUL (ML) INJECTION
Status: DISCONTINUED
Start: 2022-04-01 | End: 2022-04-01 | Stop reason: HOSPADM

## 2022-04-01 RX ORDER — HYDRALAZINE HYDROCHLORIDE 20 MG/ML
5 INJECTION INTRAMUSCULAR; INTRAVENOUS
Status: DISCONTINUED | OUTPATIENT
Start: 2022-04-01 | End: 2022-04-01 | Stop reason: HOSPADM

## 2022-04-01 RX ORDER — DEXAMETHASONE SODIUM PHOSPHATE 4 MG/ML
INJECTION, SOLUTION INTRA-ARTICULAR; INTRALESIONAL; INTRAMUSCULAR; INTRAVENOUS; SOFT TISSUE PRN
Status: DISCONTINUED | OUTPATIENT
Start: 2022-04-01 | End: 2022-04-01 | Stop reason: SURG

## 2022-04-01 RX ORDER — MEPERIDINE HYDROCHLORIDE 25 MG/ML
12.5 INJECTION INTRAMUSCULAR; INTRAVENOUS; SUBCUTANEOUS
Status: DISCONTINUED | OUTPATIENT
Start: 2022-04-01 | End: 2022-04-01 | Stop reason: HOSPADM

## 2022-04-01 RX ORDER — HYDROMORPHONE HYDROCHLORIDE 2 MG/ML
INJECTION, SOLUTION INTRAMUSCULAR; INTRAVENOUS; SUBCUTANEOUS PRN
Status: DISCONTINUED | OUTPATIENT
Start: 2022-04-01 | End: 2022-04-01 | Stop reason: SURG

## 2022-04-01 RX ORDER — CELECOXIB 200 MG/1
400 CAPSULE ORAL ONCE
Status: COMPLETED | OUTPATIENT
Start: 2022-04-01 | End: 2022-04-01

## 2022-04-01 RX ORDER — LABETALOL HYDROCHLORIDE 5 MG/ML
5 INJECTION, SOLUTION INTRAVENOUS
Status: DISCONTINUED | OUTPATIENT
Start: 2022-04-01 | End: 2022-04-01 | Stop reason: HOSPADM

## 2022-04-01 RX ORDER — HYDROMORPHONE HYDROCHLORIDE 1 MG/ML
0.4 INJECTION, SOLUTION INTRAMUSCULAR; INTRAVENOUS; SUBCUTANEOUS
Status: DISCONTINUED | OUTPATIENT
Start: 2022-04-01 | End: 2022-04-01 | Stop reason: HOSPADM

## 2022-04-01 RX ORDER — SCOLOPAMINE TRANSDERMAL SYSTEM 1 MG/1
1 PATCH, EXTENDED RELEASE TRANSDERMAL
Status: DISCONTINUED | OUTPATIENT
Start: 2022-04-01 | End: 2022-04-01 | Stop reason: HOSPADM

## 2022-04-01 RX ORDER — OXYCODONE HCL 5 MG/5 ML
5 SOLUTION, ORAL ORAL
Status: DISCONTINUED | OUTPATIENT
Start: 2022-04-01 | End: 2022-04-01 | Stop reason: HOSPADM

## 2022-04-01 RX ORDER — ONDANSETRON 2 MG/ML
INJECTION INTRAMUSCULAR; INTRAVENOUS PRN
Status: DISCONTINUED | OUTPATIENT
Start: 2022-04-01 | End: 2022-04-01 | Stop reason: SURG

## 2022-04-01 RX ORDER — HALOPERIDOL 5 MG/ML
1 INJECTION INTRAMUSCULAR
Status: DISCONTINUED | OUTPATIENT
Start: 2022-04-01 | End: 2022-04-01 | Stop reason: HOSPADM

## 2022-04-01 RX ORDER — HYDROMORPHONE HYDROCHLORIDE 1 MG/ML
0.1 INJECTION, SOLUTION INTRAMUSCULAR; INTRAVENOUS; SUBCUTANEOUS
Status: DISCONTINUED | OUTPATIENT
Start: 2022-04-01 | End: 2022-04-01 | Stop reason: HOSPADM

## 2022-04-01 RX ORDER — GABAPENTIN 300 MG/1
600 CAPSULE ORAL ONCE
Status: COMPLETED | OUTPATIENT
Start: 2022-04-01 | End: 2022-04-01

## 2022-04-01 RX ORDER — ROCURONIUM BROMIDE 10 MG/ML
INJECTION, SOLUTION INTRAVENOUS PRN
Status: DISCONTINUED | OUTPATIENT
Start: 2022-04-01 | End: 2022-04-01 | Stop reason: SURG

## 2022-04-01 RX ORDER — OXYCODONE HYDROCHLORIDE AND ACETAMINOPHEN 5; 325 MG/1; MG/1
1 TABLET ORAL EVERY 6 HOURS PRN
Qty: 28 TABLET | Refills: 0 | Status: SHIPPED | OUTPATIENT
Start: 2022-04-01 | End: 2022-04-08

## 2022-04-01 RX ORDER — SODIUM CHLORIDE, SODIUM LACTATE, POTASSIUM CHLORIDE, CALCIUM CHLORIDE 600; 310; 30; 20 MG/100ML; MG/100ML; MG/100ML; MG/100ML
INJECTION, SOLUTION INTRAVENOUS CONTINUOUS
Status: DISCONTINUED | OUTPATIENT
Start: 2022-04-01 | End: 2022-04-01 | Stop reason: HOSPADM

## 2022-04-01 RX ORDER — CEFAZOLIN SODIUM 1 G/3ML
INJECTION, POWDER, FOR SOLUTION INTRAMUSCULAR; INTRAVENOUS PRN
Status: DISCONTINUED | OUTPATIENT
Start: 2022-04-01 | End: 2022-04-01 | Stop reason: SURG

## 2022-04-01 RX ORDER — DIPHENHYDRAMINE HYDROCHLORIDE 50 MG/ML
12.5 INJECTION INTRAMUSCULAR; INTRAVENOUS
Status: DISCONTINUED | OUTPATIENT
Start: 2022-04-01 | End: 2022-04-01 | Stop reason: HOSPADM

## 2022-04-01 RX ORDER — BUPIVACAINE HYDROCHLORIDE 2.5 MG/ML
INJECTION, SOLUTION EPIDURAL; INFILTRATION; INTRACAUDAL
Status: DISCONTINUED
Start: 2022-04-01 | End: 2022-04-01 | Stop reason: HOSPADM

## 2022-04-01 RX ORDER — OMEPRAZOLE 20 MG/1
20 CAPSULE, DELAYED RELEASE ORAL DAILY
COMMUNITY
End: 2022-10-14

## 2022-04-01 RX ORDER — BUPIVACAINE HYDROCHLORIDE AND EPINEPHRINE 2.5; 5 MG/ML; UG/ML
INJECTION, SOLUTION EPIDURAL; INFILTRATION; INTRACAUDAL; PERINEURAL
Status: DISCONTINUED | OUTPATIENT
Start: 2022-04-01 | End: 2022-04-01 | Stop reason: HOSPADM

## 2022-04-01 RX ORDER — ACETAMINOPHEN 500 MG
1000 TABLET ORAL ONCE
Status: COMPLETED | OUTPATIENT
Start: 2022-04-01 | End: 2022-04-01

## 2022-04-01 RX ORDER — SODIUM CHLORIDE, SODIUM LACTATE, POTASSIUM CHLORIDE, CALCIUM CHLORIDE 600; 310; 30; 20 MG/100ML; MG/100ML; MG/100ML; MG/100ML
INJECTION, SOLUTION INTRAVENOUS CONTINUOUS
Status: ACTIVE | OUTPATIENT
Start: 2022-04-01 | End: 2022-04-01

## 2022-04-01 RX ADMIN — CELECOXIB 400 MG: 200 CAPSULE ORAL at 07:44

## 2022-04-01 RX ADMIN — PROPOFOL 200 MG: 10 INJECTION, EMULSION INTRAVENOUS at 09:22

## 2022-04-01 RX ADMIN — LIDOCAINE HYDROCHLORIDE 0.5 ML: 10 INJECTION, SOLUTION INFILTRATION; PERINEURAL at 08:54

## 2022-04-01 RX ADMIN — ACETAMINOPHEN 1000 MG: 500 TABLET ORAL at 07:44

## 2022-04-01 RX ADMIN — HYDROMORPHONE HYDROCHLORIDE 1 MG: 2 INJECTION INTRAMUSCULAR; INTRAVENOUS; SUBCUTANEOUS at 09:27

## 2022-04-01 RX ADMIN — SCOPALAMINE 1 PATCH: 1 PATCH, EXTENDED RELEASE TRANSDERMAL at 08:52

## 2022-04-01 RX ADMIN — ONDANSETRON 4 MG: 2 INJECTION INTRAMUSCULAR; INTRAVENOUS at 13:07

## 2022-04-01 RX ADMIN — GABAPENTIN 600 MG: 300 CAPSULE ORAL at 07:44

## 2022-04-01 RX ADMIN — FENTANYL CITRATE 100 MCG: 50 INJECTION, SOLUTION INTRAMUSCULAR; INTRAVENOUS at 09:22

## 2022-04-01 RX ADMIN — HALOPERIDOL LACTATE 1 MG: 5 INJECTION, SOLUTION INTRAMUSCULAR at 13:51

## 2022-04-01 RX ADMIN — KETOROLAC TROMETHAMINE 30 MG: 30 INJECTION, SOLUTION INTRAMUSCULAR at 10:09

## 2022-04-01 RX ADMIN — ROCURONIUM BROMIDE 20 MG: 10 INJECTION, SOLUTION INTRAVENOUS at 09:27

## 2022-04-01 RX ADMIN — SODIUM CHLORIDE, POTASSIUM CHLORIDE, SODIUM LACTATE AND CALCIUM CHLORIDE: 600; 310; 30; 20 INJECTION, SOLUTION INTRAVENOUS at 08:54

## 2022-04-01 RX ADMIN — SUGAMMADEX 200 MG: 100 INJECTION, SOLUTION INTRAVENOUS at 10:09

## 2022-04-01 RX ADMIN — SUCCINYLCHOLINE CHLORIDE 100 MG: 20 INJECTION, SOLUTION INTRAMUSCULAR; INTRAVENOUS; PARENTERAL at 09:23

## 2022-04-01 RX ADMIN — HYDROMORPHONE HYDROCHLORIDE 1 MG: 2 INJECTION INTRAMUSCULAR; INTRAVENOUS; SUBCUTANEOUS at 09:30

## 2022-04-01 RX ADMIN — ONDANSETRON 4 MG: 2 INJECTION INTRAMUSCULAR; INTRAVENOUS at 09:28

## 2022-04-01 RX ADMIN — CEFAZOLIN 2 G: 330 INJECTION, POWDER, FOR SOLUTION INTRAMUSCULAR; INTRAVENOUS at 09:24

## 2022-04-01 RX ADMIN — DEXAMETHASONE SODIUM PHOSPHATE 4 MG: 4 INJECTION, SOLUTION INTRA-ARTICULAR; INTRALESIONAL; INTRAMUSCULAR; INTRAVENOUS; SOFT TISSUE at 09:24

## 2022-04-01 ASSESSMENT — PAIN DESCRIPTION - PAIN TYPE
TYPE: SURGICAL PAIN

## 2022-04-01 ASSESSMENT — PAIN SCALES - GENERAL: PAIN_LEVEL: 0

## 2022-04-01 ASSESSMENT — FIBROSIS 4 INDEX: FIB4 SCORE: 0.57

## 2022-04-01 NOTE — DISCHARGE INSTRUCTIONS
ACTIVITY: Rest and take it easy for the first 24 hours.  A responsible adult is recommended to remain with you during that time.  It is normal to feel sleepy.  We encourage you to not do anything that requires balance, judgment or coordination.    MILD FLU-LIKE SYMPTOMS ARE NORMAL. YOU MAY EXPERIENCE GENERALIZED MUSCLE ACHES, THROAT IRRITATION, HEADACHE AND/OR SOME NAUSEA.    FOR 24 HOURS DO NOT:  Drive, operate machinery or run household appliances.  Drink beer or alcoholic beverages.   Make important decisions or sign legal documents.    SPECIAL INSTRUCTIONS:  SEE LAP TUBAL INSTRUCTION SHEET    DIET: To avoid nausea, slowly advance diet as tolerated, avoiding spicy or greasy foods for the first day.  Add more substantial food to your diet according to your physician's instructions.  Babies can be fed formula or breast milk as soon as they are hungry.  INCREASE FLUIDS AND FIBER TO AVOID CONSTIPATION.    SURGICAL DRESSING/BATHING: **MAY SHOWER TOMORROW.  NO TUB BATHS, HOT TUBS OR SWIMMING UNTIL APPROVED BY PHYSICIAN      FOLLOW-UP APPOINTMENT:  A follow-up appointment should be arranged with your doctor in *FOLLOW UP WITH DR. SYED LANDON**; call to schedule.    You should CALL YOUR PHYSICIAN if you develop:  Fever greater than 101 degrees F.  Pain not relieved by medication, or persistent nausea or vomiting.  Excessive bleeding (blood soaking through dressing) or unexpected drainage from the wound.  Extreme redness or swelling around the incision site, drainage of pus or foul smelling drainage.  Inability to urinate or empty your bladder within 8 hours.  Problems with breathing or chest pain.    You should call 911 if you develop problems with breathing or chest pain.  If you are unable to contact your doctor or surgical center, you should go to the nearest emergency room or urgent care center.  Physician's telephone #: *DR. SYED LANDON [phone] **    If any questions arise, call your doctor.  If  your doctor is not available, please feel free to call the Surgical Center at [phone].     A registered nurse may call you a few days after your surgery to see how you are doing after your procedure.    MEDICATIONS: Resume taking daily medication.  Take prescribed pain medication with food.  If no medication is prescribed, you may take non-aspirin pain medication if needed.  PAIN MEDICATION CAN BE VERY CONSTIPATING.  Take a stool softener or laxative such as senokot, pericolace, or milk of magnesia if needed.    Prescription given for *PERCOCET AND MOTRIN**.  Last pain medication given at *_____________________________________**.    If your physician has prescribed pain medication that includes Acetaminophen (Tylenol), do not take additional Acetaminophen (Tylenol) while taking the prescribed medication.    Depression / Suicide Risk    As you are discharged from this [facility] facility, it is important to learn how to keep safe from harming yourself.    Recognize the warning signs:  · Abrupt changes in personality, positive or negative- including increase in energy   · Giving away possessions  · Change in eating patterns- significant weight changes-  positive or negative  · Change in sleeping patterns- unable to sleep or sleeping all the time   · Unwillingness or inability to communicate  · Depression  · Unusual sadness, discouragement and loneliness  · Talk of wanting to die  · Neglect of personal appearance   · Rebelliousness- reckless behavior  · Withdrawal from people/activities they love  · Confusion- inability to concentrate     If you or a loved one observes any of these behaviors or has concerns about self-harm, here's what you can do:  · Talk about it- your feelings and reasons for harming yourself  · Remove any means that you might use to hurt yourself (examples: pills, rope, extension cords, firearm)  · Get professional help from the community (Mental Health, Substance Abuse, psychological  counseling)  · Do not be alone:Call your Safe Contact- someone whom you trust who will be there for you.  · Call your local CRISIS HOTLINE 742-3971 or 302-961-6696  · Call your local Children's Mobile Crisis Response Team [place] (268) 913-1676 or www.[employer]  · Call the toll free National Suicide Prevention Hotlines   · National Suicide Prevention Lifeline 229-313-RBCA (6346)  · National Hope Line Network 800-SUICIDE (877-3981)

## 2022-04-01 NOTE — OR SURGEON
Immediate Post OP Note    PreOp Diagnosis: Breast cancer (+) HR     PostOp Diagnosis: same, LO cyst      Procedure(s):  LAPAROSCOPY - Wound Class: Clean Contaminated  BILATERAL SALPINGO-OOPHORECTOMY - Wound Class: Clean Contaminated  LO CYST DRAINAGE     Surgeon(s):  Merlyn Monteiro M.D.    Anesthesiologist/Type of Anesthesia:  Anesthesiologist: Hernando Lawson M.D./General    Surgical Staff:  Circulator: Yamila Mireles R.N.  Scrub Person: Leti Max    Specimens removed if any:  ID Type Source Tests Collected by Time Destination   1 : Left Ovarian Cyst Fluid for Cytology Cyst Cyst AEROBIC/ANAEROBIC CULTURE (SURGERY) Merlyn Monteiro M.D. 4/1/2022  9:57 AM    A : Bilateral Fallopian Tubes, Bilateral Ovaries Tissue Fallopian Tube PATHOLOGY SPECIMEN Merlyn Monteiro M.D. 4/1/2022  9:52 AM    B : Left Ovarian Cyst Tissue Ovary PATHOLOGY SPECIMEN Merlyn Monteiro M.D. 4/1/2022  9:54 AM        Estimated Blood Loss: MINIMAL    Findings: UTERUS BILATERAL TUBES AND RO NORMAL      LO CYST     Complications: NONE     4/1/2022 10:20 AM Merlyn Monteiro M.D.

## 2022-04-01 NOTE — ANESTHESIA PREPROCEDURE EVALUATION
Case: [id number] Date/Time: 04/01/22 0845    Procedures:       LAPAROSCOPY      SALPINGO-OOPHORECTOMY (Bilateral )    Pre-op diagnosis: BREAST CANCER, ELECTIVE REMOVAL OF BILATERAL OVARIES    Location: [facility] ROOM 25 / SURGERY SAME DAY [facility]    Surgeons: Merlyn Monteiro M.D.          Relevant Problems   No relevant active problems       Physical Exam    Airway   Mallampati: II  TM distance: >3 FB  Neck ROM: full       Cardiovascular - normal exam  Rhythm: regular  Rate: normal  (-) murmur     Dental - normal exam           Pulmonary - normal exam  Breath sounds clear to auscultation     Abdominal   (+) obese     Neurological - normal exam                 Anesthesia Plan    ASA 2       Plan - general       Airway plan will be ETT          Induction: intravenous    Postoperative Plan: Postoperative administration of opioids is intended.    Pertinent diagnostic labs and testing reviewed    Informed Consent:    Anesthetic plan and risks discussed with patient.    Use of blood products discussed with: patient whom consented to blood products.

## 2022-04-01 NOTE — ANESTHESIA POSTPROCEDURE EVALUATION
Patient: Deisy Galo    Procedure Summary     Date: 04/01/22 Room / Location: [facility] ROOM 25 / SURGERY SAME DAY [facility]    Anesthesia Start: 0918 Anesthesia Stop: 1021    Procedures:       LAPAROSCOPY (Bilateral Abdomen)      SALPINGO-OOPHORECTOMY (Bilateral Abdomen) Diagnosis: (BREAST CANCER, ELECTIVE REMOVAL OF BILATERAL OVARIES, LEFT OVARIAN CYST)    Surgeons: Merlyn Monteiro M.D. Responsible Provider: Hernando Lawson M.D.    Anesthesia Type: general ASA Status: 2          Final Anesthesia Type: general  Last vitals  BP   Blood Pressure: 113/67    Temp   36.5 °C (97.7 °F)    Pulse   95   Resp   18    SpO2   96 %      Anesthesia Post Evaluation    Patient location during evaluation: PACU  Patient participation: complete - patient participated  Level of consciousness: awake and alert  Pain score: 0    Airway patency: patent  Anesthetic complications: no  Cardiovascular status: hemodynamically stable  Respiratory status: acceptable and face mask  Hydration status: euvolemic    PONV: none          No complications documented.     Nurse Pain Score: 3 (NPRS)

## 2022-04-01 NOTE — H&P
Proposed Surgical Procedure:          1. Laparoscopic Bilateral Salpingo-Oophorectomy          2. Any other indicated procedure    Indication for the Procedure:          1. Breast Cancer HR+    History of Present Illness:    Paige is a 46 year old who comes today for consult for elective bilateral oophorectomy.  Pt was advised by her oncologist: Dr Spence    She is a breast cancer pt in 2020 - s/p Right Lumpectomy, s/p RTX, Chemo and now on Trelstar Inj, Letrozole  Breast cancer is HER2+    Pt was menopause at 45. no PMB, no pelvic pains.     Review of systems:  Pertinent positives documented in HPI and all other systems reviewed & are negative    All PMH, PSH, allergies, social history and FH reviewed and updated today:  Past Medical History:   Diagnosis Date   • Anesthesia     states has very low BP   • Cancer (HCC) 2020    Right breast  IDC/DCIS   • Disorder of thyroid     nodule    • Heart burn 2020    changed diet says much improved   • Indigestion    • Pain 08/13/2020    right breast, 1-2/10   • Snoring     no sleep study       Past Surgical History:   Procedure Laterality Date   • THYROID LOBECTOMY Left 1/19/2022    Procedure: LOBECTOMY, THYROID;  Surgeon: Aspen Woodruff M.D.;  Location: SURGERY SAME DAY [facility];  Service: General   • PARTIAL MASTECTOMY Right 8/20/2020    Procedure: MASTECTOMY, PARTIAL- RE-EXCISE FOR MARGINS;  Surgeon: Aspen Woodruff M.D.;  Location: SURGERY SAME DAY [facility];  Service: General   • PARTIAL MASTECTOMY Right 8/14/2020    Procedure: MASTECTOMY, PARTIAL- RE EXCISION FOR MARGINS;  Surgeon: Aspen Woodruff M.D.;  Location: [facility];  Service: General   • PARTIAL MASTECTOMY Right 8/7/2020    Procedure: MASTECTOMY, PARTIAL;  Surgeon: Aspen Woodruff M.D.;  Location: [facility];  Service: General   • NODE BIOPSY SENTINEL Right 8/7/2020    Procedure: BIOPSY, LYMPH NODE, SENTINEL;  Surgeon: Aspen Woodruff M.D.;  Location: [facility];   "Service: General   • CATH PLACEMENT Left 8/7/2020    Procedure: INSERTION, CATHETER-PORT PLACEMENT;  Surgeon: Aspen Woodruff M.D.;  Location: SURGERY [facility];  Service: General   • MASTECTOMY     • LA CHEMOTHERAPY, UNSPECIFIED PROCEDURE     • LA RADIATION THERAPY PLAN SIMPLE         Allergies:   Allergies   Allergen Reactions   • Latex Rash     Reaction at dentist office       Social History     Socioeconomic History   • Marital status: Single     Spouse name: Not on file   • Number of children: Not on file   • Years of education: Not on file   • Highest education level: Not on file   Occupational History   • Not on file   Tobacco Use   • Smoking status: Never Smoker   • Smokeless tobacco: Never Used   Vaping Use   • Vaping Use: Never used   Substance and Sexual Activity   • Alcohol use: Not Currently     Comment: occ   • Drug use: Not Currently     Types: Oral     Comment: THC/CBD candy   • Sexual activity: Not on file   Other Topics Concern   • Not on file   Social History Narrative   • Not on file     Social Determinants of Health     Financial Resource Strain: Not on file   Food Insecurity: Not on file   Transportation Needs: Not on file   Physical Activity: Not on file   Stress: Not on file   Social Connections: Not on file   Intimate Partner Violence: Not on file   Housing Stability: Not on file       Family History   Problem Relation Age of Onset   • Cancer Brother         BCC   • Cancer Maternal Uncle         lung cancer   • Cancer Maternal Grandmother         colon cancer/melanoma   • Cancer Maternal Grandfather         lymphoma   • Cancer Maternal Uncle         skin cancer (unknown type)   • Cancer Maternal great-grandmother         breast cancer       Physical exam:  Ht 1.651 m (5' 5\")   Wt 79 kg (174 lb 2.6 oz)     General:appears stated age, is in no apparent distress, is well developed and well nourished  Head: normocephalic, non-tender  Neck: neck is supple  CV : regular rate and rhythm, no " peripheral edema  Lungs: Normal respiratory effort. Clear to auscultation bilaterally  Abdomen: Bowel sounds positive, nondistended, soft, nontender x4, no rebound or guarding. No organomegaly. No masses.  Female GYN: deferred   Skin: No rashes, or ulcers or lesions seen  Psychiatric: Patient shows appropriate affect, is alert and oriented x3, intact judgment and insight.  1. Pre-operative laboratory examination  SARS-CoV-2 PCR (24 hour In-House): Collect NP swab in VTM    SARS-CoV-2 PCR (24 hour In-House): Collect NP swab in VTM     LAPAROSCOPIC BILATERAL SALPINGO-OOPHORECTOMY     Discussed today with a risks and benefits of laparoscopy are discussed with patient today. Specific risk for a laparoscopy are infection, bleeding, scar to the exterior or interior of abdomen, damage to other structures including bowel, bladder or ureters. Failure of laparoscopy to diagnose and or correct patient's problem.    Patient is also made aware that removal of both ovaries will cause her surgical menopause with associated symptoms of hot flushes and vaginal dryness. Estrogen, hormone produced by the ovaries have benefits on bone health and heart. Without estrogen, she is aware about risks of osteoporosis and heart disease.     Patient was discussed at length about options for management: hormonal tx - trial to suppress ovaries that might resolve pelvic pains. Risks/benefits/indications/alternatives explained.  She opted to have definitive surgical management via removal of bilateral ovaries.    Anesthesia risks: stroke, respiratory failure and rarely death.    All questions addressed to stated satisfaction. Agreeable to proceed and signed informed consents.    Merlyn Zuleta MD

## 2022-04-01 NOTE — ANESTHESIA TIME REPORT
Anesthesia Start and Stop Event Times     Date Time Event    4/1/2022 0851 Ready for Procedure     0918 Anesthesia Start     1021 Anesthesia Stop        Responsible Staff  04/01/22    Name Role Begin End    August W PERCY Lawson. Anesth 0918 1021        Preop Diagnosis (Free Text):  Pre-op Diagnosis     BREAST CANCER, ELECTIVE REMOVAL OF BILATERAL OVARIES        Preop Diagnosis (Codes):    Premium Reason  Non-Premium    Comments:

## 2022-04-01 NOTE — OP REPORT
DATE OF SERVICE:  4/1/2022     PREOPERATIVE DIAGNOSES:   BREAST CANCER; (+) HR    POSTOPERATIVE DIAGNOSES:  SAME, LO CYST      PROCEDURE DONE:  Laparoscopic Bilateral Salpingo-Oophorectomy                                        Drainage of left ovarian cyst      SURGEON:  Merlyn Kimball MD     ANESTHESIOLOGIST:  Hernando Lawson MD     ANESTHESIA:  General anesthesia.     SPECIMEN:  Bilateral ovaries and fallopian tube                        LO cyst and fluid     ESTIMATED BLOOD LOSS:  Minimal      FINDINGS:         UTERUS BILATERAL TUBES AND RO NORMAL                 LO CYST     COMPLICATIONS:  None.     PATIENT CONDITION: stable.     DESCRIPTION OF THE PROCEDURE:  Patient and family were discussed about the   risks, indications, benefits, alternatives of the procedure and they were   agreeable to proceed and signed consents.     Patient was then taken to the operating room where she was placed in supine   position.  Patient was then given general anesthesia without difficulty.  A   formal timeout was done and antibiotics given. Patient was then placed in a lithotomy position,   prepped and draped in the normal usual sterile fashion.  The bladder was   emptied using straight catheter draining clear urine.  A sponge stick was   placed into the vagina for manipulation.     Legs were deflexed and attention was turned to the abdomen for the laroscopy.     A 5 mm incision was done on the umbilical fold after infiltration with   Marcaine.  A 5 mm incision was done and a laparoscope was inserted using   Optiview entry into the abdominal cavity under direct visualization.    Pneumo-insufflation with CO2 gas was then achieved with 14 mmHg and 2.5 liters   of CO2 gas.  Pelvic cavity as described above.  1 - 5 mm port was then   placed on the left side under direct visualization at 2 finger breadths anterior superior to the anterior superior  iliac spines. Another 11 mm port was placed suprapubic area under  direct visualization. Starting with the right tube and ovary,  the fimbrial end was grasped and  LigaSure cautery was then used to grasp the IP ligament on the right, cauterized x 3 and excised using the LigaSure with excellent hemostasis. Same procedure was done on the contralateral side with   excellent hemostasis achieved. LO drainage was performed with use of laparoscopic needle, clear fluid was obtained and sent to cytology. Specimen was removed via the 11 mm suprapubic port.   At the end of the procedure, there was no bleeding noted.     Instruments were all removed.  Pneumoperitoneum was desufflated and skin was   closed using Vicryl 4-0.  All instruments, needles and sponges were correct   x2.  Patient tolerated the procedure well.  There were no intraoperative   complications noted.  Patient was sent to the PACU awake and in good   condition.        ____________________________________     Merlyn Kimball MD

## 2022-04-01 NOTE — OR NURSING
1019  RECEIVED PATIENT FROM OR.  REPORT FROM DR. PALMA.  NO AIRWAY IN PLACE.  RESPIRATIONS ARE EVEN AND UNLABORED.  3 LAP STABS TO ABDOMEN COVERED WITH GAUZE AND TEGADERM ARE CDI.      1100  PHASE 2.    1216  REPORT TO STEFANO DAWSON.    1244  RECEIVED REPORT FROM STEFANO DAWSON.  RESUMED CARE OF PATIENT.     1307  MEDICATED WITH IV ZOFRAN.     1325  UP TO THE BATHROOM.      1335  SITTING IN RECLINER EATING CRACKERS AND DRINKING GINGERALE.    1351  MEDICATED WITH IV HALDOL.      1407  UP TO THE BATHROOM.  UNABLE TO VOID.    1430  BLADDER SCAN 124.    1553  UP TO THE BATHROOM.    1600  DISCHARGED.  DISCHARGE INSTRUCTIONS GIVEN TO PATIENT AND HER MOM.  A VERBAL UNDERSTANDING OF ALL INSTRUCTIONS WAS STATED.  PATIENT TAKING PO, VOIDING AND AMBULATING WITHOUT DIFFICULTY.  PATIENT STATES SHE IS READY TO GO HOME.

## 2022-04-01 NOTE — ANESTHESIA PROCEDURE NOTES
Airway    Date/Time: 4/1/2022 9:23 AM  Performed by: Hernando Lawson M.D.  Authorized by: Hernando Lawson M.D.     Location:  OR  Urgency:  Elective  Difficult Airway: No    Indications for Airway Management:  Anesthesia      Spontaneous Ventilation: absent    Sedation Level:  Deep  Preoxygenated: Yes    Patient Position:  Sniffing  Mask Difficulty Assessment:  1 - vent by mask  Final Airway Type:  Endotracheal airway  Final Endotracheal Airway:  ETT  Cuffed: Yes    Technique Used for Successful ETT Placement:  Direct laryngoscopy  Devices/Methods Used in Placement:  Cricoid pressure and intubating stylet    Insertion Site:  Oral  Blade Type:  Berry  Laryngoscope Blade/Videolaryngoscope Blade Size:  2  ETT Size (mm):  7.0  Measured from:  Teeth  ETT to Teeth (cm):  21  Placement Verified by: auscultation and capnometry    Cormack-Lehane Classification:  Grade IIa - partial view of glottis  Number of Attempts at Approach:  1  Ventilation Between Attempts:  None  Number of Other Approaches Attempted:  0

## 2022-07-12 ENCOUNTER — HOSPITAL ENCOUNTER (OUTPATIENT)
Dept: RADIOLOGY | Facility: MEDICAL CENTER | Age: 47
End: 2022-07-12
Payer: COMMERCIAL

## 2022-07-12 DIAGNOSIS — C50.111 MALIGNANT NEOPLASM OF CENTRAL PORTION OF RIGHT FEMALE BREAST, UNSPECIFIED ESTROGEN RECEPTOR STATUS (HCC): ICD-10-CM

## 2022-07-12 PROCEDURE — G0279 TOMOSYNTHESIS, MAMMO: HCPCS

## 2022-08-31 ENCOUNTER — TELEPHONE (OUTPATIENT)
Dept: SCHEDULING | Facility: IMAGING CENTER | Age: 47
End: 2022-08-31

## 2022-10-14 ENCOUNTER — OFFICE VISIT (OUTPATIENT)
Dept: MEDICAL GROUP | Facility: PHYSICIAN GROUP | Age: 47
End: 2022-10-14
Payer: COMMERCIAL

## 2022-10-14 VITALS
WEIGHT: 186 LBS | SYSTOLIC BLOOD PRESSURE: 118 MMHG | OXYGEN SATURATION: 96 % | BODY MASS INDEX: 30.99 KG/M2 | TEMPERATURE: 97.3 F | DIASTOLIC BLOOD PRESSURE: 72 MMHG | HEIGHT: 65 IN | RESPIRATION RATE: 12 BRPM | HEART RATE: 83 BPM

## 2022-10-14 DIAGNOSIS — C50.811 MALIGNANT NEOPLASM OF OVERLAPPING SITES OF RIGHT BREAST IN FEMALE, ESTROGEN RECEPTOR POSITIVE (HCC): ICD-10-CM

## 2022-10-14 DIAGNOSIS — E66.9 OBESITY (BMI 30-39.9): ICD-10-CM

## 2022-10-14 DIAGNOSIS — Z90.09 HISTORY OF LOBECTOMY OF THYROID: ICD-10-CM

## 2022-10-14 DIAGNOSIS — Z17.0 MALIGNANT NEOPLASM OF OVERLAPPING SITES OF RIGHT BREAST IN FEMALE, ESTROGEN RECEPTOR POSITIVE (HCC): ICD-10-CM

## 2022-10-14 PROCEDURE — 99214 OFFICE O/P EST MOD 30 MIN: CPT

## 2022-10-14 ASSESSMENT — FIBROSIS 4 INDEX: FIB4 SCORE: 0.58

## 2022-10-14 ASSESSMENT — PATIENT HEALTH QUESTIONNAIRE - PHQ9: CLINICAL INTERPRETATION OF PHQ2 SCORE: 0

## 2022-10-14 NOTE — ASSESSMENT & PLAN NOTE
Chronic worsenning  -Labs as follows  -Recommend clean eating and exercise as discussed including taking advantage of programs such as [facility] and [employer] [facility] for breast cancer survivors

## 2022-10-14 NOTE — PROGRESS NOTES
"Subjective:     CC:  Diagnoses of Malignant neoplasm of overlapping sites of right breast in female, estrogen receptor positive (HCC), History of lobectomy of thyroid, and Obesity (BMI 30-39.9) were pertinent to this visit.    HISTORY OF THE PRESENT ILLNESS: Patient is a 47 y.o. female. This pleasant patient is here today to establish care and discuss the following problems:    Problem   History of Lobectomy of Thyroid    -This surgery occurred in Jan 2022 by Dr Woodruff with benign findings  - Patient had her TSH levels drawn shortly after February 2022 which were within normal limits.  -Reports to have weight gain, mood lability/irritability, fatigue, insomnia  -Not taking thyroid replacement     Obesity (Bmi 30-39.9)    This is a chronic condition.   Max weight: 186  Current weight: 186  BMI: 30.95  Diet: Mostly healthy  Exercise: Trying to walk more  Goal Weight: 145       Malignant Neoplasm of Overlapping Sites of Right Breast in Female, Estrogen Receptor Positive (Hcc)    - Patient with history of malignant neoplasm of right breast estrogen receptor positive for which she has undergone partial right mastectomy, bilateral oophorectomy, chemotherapy and radiation treatment.  [facility]  She needs referral as she has new insurance.         Health Maintenance: Recommend follow-up for health maintenance topics    ROS:   Review of Systems   Constitutional:  Positive for malaise/fatigue.   Musculoskeletal:  Positive for joint pain.   Psychiatric/Behavioral:          Mood lability       Objective:     Exam: /72 (BP Location: Left arm, Patient Position: Sitting, BP Cuff Size: Adult)   Pulse 83   Temp 36.3 °C (97.3 °F) (Temporal)   Resp 12   Ht 1.651 m (5' 5\")   Wt 84.4 kg (186 lb)   SpO2 96%  Body mass index is 30.95 kg/m².    Physical Exam  Constitutional:       General: She is not in acute distress.     Appearance: Normal appearance. She is not " ill-appearing or toxic-appearing.   HENT:      Head: Normocephalic.   Eyes:      Conjunctiva/sclera: Conjunctivae normal.   Pulmonary:      Effort: Pulmonary effort is normal.   Skin:     General: Skin is warm and dry.   Neurological:      General: No focal deficit present.      Mental Status: She is alert and oriented to person, place, and time.   Psychiatric:         Mood and Affect: Mood normal.         Behavior: Behavior normal.         Labs: No recent labs    Assessment & Plan: Medical Decision Making   47 y.o. female with the following -    Problem List Items Addressed This Visit       Malignant neoplasm of overlapping sites of right breast in female, estrogen receptor positive (HCC)    Relevant Orders    Referral to Hematology Oncology    CBC WITH DIFFERENTIAL    History of lobectomy of thyroid     We will check TSH levels with T4 and free T3, labs ordered as follows:         Relevant Orders    TSH    T3 FREE    FREE THYROXINE    Obesity (BMI 30-39.9)     Chronic worsenning  -Labs as follows  -Recommend clean eating and exercise as discussed including taking advantage of programs such as [facility] [facility] and [facility] for breast cancer survivors         Relevant Orders    Comp Metabolic Panel    Lipid Profile    Patient identified as having weight management issue.  Appropriate orders and counseling given.    HEMOGLOBIN A1C       Differential diagnosis, natural history, supportive care, and indications for immediate follow-up discussed.  Shared decision making approach was utilized, and patient is amendable with plan of care.  Patient understands to return to clinic or go to the emergency department if symptoms worsen. All questions and concerns addressed.      Return in about 6 months (around 4/14/2023).    Please note that this dictation was created using voice recognition software. I have made every reasonable attempt to correct obvious errors, but I expect that there are errors of grammar  and possibly content that I did not discover before finalizing the note.

## 2022-10-19 ENCOUNTER — HOSPITAL ENCOUNTER (OUTPATIENT)
Dept: LAB | Facility: MEDICAL CENTER | Age: 47
End: 2022-10-19
Payer: COMMERCIAL

## 2022-10-19 DIAGNOSIS — E66.9 OBESITY (BMI 30-39.9): ICD-10-CM

## 2022-10-19 DIAGNOSIS — Z17.0 MALIGNANT NEOPLASM OF OVERLAPPING SITES OF RIGHT BREAST IN FEMALE, ESTROGEN RECEPTOR POSITIVE (HCC): ICD-10-CM

## 2022-10-19 DIAGNOSIS — Z90.09 HISTORY OF LOBECTOMY OF THYROID: ICD-10-CM

## 2022-10-19 DIAGNOSIS — C50.811 MALIGNANT NEOPLASM OF OVERLAPPING SITES OF RIGHT BREAST IN FEMALE, ESTROGEN RECEPTOR POSITIVE (HCC): ICD-10-CM

## 2022-10-19 LAB
ALBUMIN SERPL BCP-MCNC: 4.6 G/DL (ref 3.2–4.9)
ALBUMIN/GLOB SERPL: 1.5 G/DL
ALP SERPL-CCNC: 77 U/L (ref 30–99)
ALT SERPL-CCNC: 15 U/L (ref 2–50)
ANION GAP SERPL CALC-SCNC: 11 MMOL/L (ref 7–16)
AST SERPL-CCNC: 16 U/L (ref 12–45)
BASOPHILS # BLD AUTO: 0.5 % (ref 0–1.8)
BASOPHILS # BLD: 0.03 K/UL (ref 0–0.12)
BILIRUB SERPL-MCNC: 0.5 MG/DL (ref 0.1–1.5)
BUN SERPL-MCNC: 11 MG/DL (ref 8–22)
CALCIUM SERPL-MCNC: 9.8 MG/DL (ref 8.5–10.5)
CHLORIDE SERPL-SCNC: 101 MMOL/L (ref 96–112)
CHOLEST SERPL-MCNC: 225 MG/DL (ref 100–199)
CO2 SERPL-SCNC: 27 MMOL/L (ref 20–33)
CREAT SERPL-MCNC: 0.74 MG/DL (ref 0.5–1.4)
EOSINOPHIL # BLD AUTO: 0.73 K/UL (ref 0–0.51)
EOSINOPHIL NFR BLD: 13.2 % (ref 0–6.9)
ERYTHROCYTE [DISTWIDTH] IN BLOOD BY AUTOMATED COUNT: 43.2 FL (ref 35.9–50)
EST. AVERAGE GLUCOSE BLD GHB EST-MCNC: 111 MG/DL
FASTING STATUS PATIENT QL REPORTED: NORMAL
GFR SERPLBLD CREATININE-BSD FMLA CKD-EPI: 102 ML/MIN/1.73 M 2
GLOBULIN SER CALC-MCNC: 3.1 G/DL (ref 1.9–3.5)
GLUCOSE SERPL-MCNC: 98 MG/DL (ref 65–99)
HBA1C MFR BLD: 5.5 % (ref 4–5.6)
HCT VFR BLD AUTO: 44.4 % (ref 37–47)
HDLC SERPL-MCNC: 65 MG/DL
HGB BLD-MCNC: 15.1 G/DL (ref 12–16)
IMM GRANULOCYTES # BLD AUTO: 0.01 K/UL (ref 0–0.11)
IMM GRANULOCYTES NFR BLD AUTO: 0.2 % (ref 0–0.9)
LDLC SERPL CALC-MCNC: 148 MG/DL
LYMPHOCYTES # BLD AUTO: 1.34 K/UL (ref 1–4.8)
LYMPHOCYTES NFR BLD: 24.2 % (ref 22–41)
MCH RBC QN AUTO: 32 PG (ref 27–33)
MCHC RBC AUTO-ENTMCNC: 34 G/DL (ref 33.6–35)
MCV RBC AUTO: 94.1 FL (ref 81.4–97.8)
MONOCYTES # BLD AUTO: 0.58 K/UL (ref 0–0.85)
MONOCYTES NFR BLD AUTO: 10.5 % (ref 0–13.4)
NEUTROPHILS # BLD AUTO: 2.85 K/UL (ref 2–7.15)
NEUTROPHILS NFR BLD: 51.4 % (ref 44–72)
NRBC # BLD AUTO: 0 K/UL
NRBC BLD-RTO: 0 /100 WBC
PLATELET # BLD AUTO: 344 K/UL (ref 164–446)
PMV BLD AUTO: 10.4 FL (ref 9–12.9)
POTASSIUM SERPL-SCNC: 4.1 MMOL/L (ref 3.6–5.5)
PROT SERPL-MCNC: 7.7 G/DL (ref 6–8.2)
RBC # BLD AUTO: 4.72 M/UL (ref 4.2–5.4)
SODIUM SERPL-SCNC: 139 MMOL/L (ref 135–145)
T3FREE SERPL-MCNC: 2.96 PG/ML (ref 2–4.4)
T4 FREE SERPL-MCNC: 1.06 NG/DL (ref 0.93–1.7)
TRIGL SERPL-MCNC: 59 MG/DL (ref 0–149)
TSH SERPL DL<=0.005 MIU/L-ACNC: 1.72 UIU/ML (ref 0.38–5.33)
WBC # BLD AUTO: 5.5 K/UL (ref 4.8–10.8)

## 2022-10-19 PROCEDURE — 85025 COMPLETE CBC W/AUTO DIFF WBC: CPT

## 2022-10-19 PROCEDURE — 84439 ASSAY OF FREE THYROXINE: CPT

## 2022-10-19 PROCEDURE — 36415 COLL VENOUS BLD VENIPUNCTURE: CPT

## 2022-10-19 PROCEDURE — 80053 COMPREHEN METABOLIC PANEL: CPT

## 2022-10-19 PROCEDURE — 80061 LIPID PANEL: CPT

## 2022-10-19 PROCEDURE — 84443 ASSAY THYROID STIM HORMONE: CPT

## 2022-10-19 PROCEDURE — 83036 HEMOGLOBIN GLYCOSYLATED A1C: CPT

## 2022-10-19 PROCEDURE — 84481 FREE ASSAY (FT-3): CPT

## 2022-11-04 ENCOUNTER — APPOINTMENT (OUTPATIENT)
Dept: MEDICAL GROUP | Facility: PHYSICIAN GROUP | Age: 47
End: 2022-11-04
Payer: COMMERCIAL

## 2022-12-14 ENCOUNTER — OFFICE VISIT (OUTPATIENT)
Dept: MEDICAL GROUP | Facility: PHYSICIAN GROUP | Age: 47
End: 2022-12-14
Payer: COMMERCIAL

## 2022-12-14 VITALS
TEMPERATURE: 97.7 F | WEIGHT: 187 LBS | HEART RATE: 73 BPM | DIASTOLIC BLOOD PRESSURE: 60 MMHG | SYSTOLIC BLOOD PRESSURE: 96 MMHG | HEIGHT: 63 IN | OXYGEN SATURATION: 97 % | BODY MASS INDEX: 33.13 KG/M2

## 2022-12-14 DIAGNOSIS — E78.00 PURE HYPERCHOLESTEROLEMIA: ICD-10-CM

## 2022-12-14 DIAGNOSIS — Z23 NEED FOR VACCINATION: ICD-10-CM

## 2022-12-14 DIAGNOSIS — Z11.59 NEED FOR HEPATITIS C SCREENING TEST: ICD-10-CM

## 2022-12-14 DIAGNOSIS — Z90.09 HISTORY OF LOBECTOMY OF THYROID: ICD-10-CM

## 2022-12-14 DIAGNOSIS — Z12.11 SCREENING FOR COLORECTAL CANCER: ICD-10-CM

## 2022-12-14 DIAGNOSIS — Z12.12 SCREENING FOR COLORECTAL CANCER: ICD-10-CM

## 2022-12-14 DIAGNOSIS — K21.00 GASTROESOPHAGEAL REFLUX DISEASE WITH ESOPHAGITIS WITHOUT HEMORRHAGE: ICD-10-CM

## 2022-12-14 PROCEDURE — 90471 IMMUNIZATION ADMIN: CPT

## 2022-12-14 PROCEDURE — 99214 OFFICE O/P EST MOD 30 MIN: CPT | Mod: 25

## 2022-12-14 PROCEDURE — 90715 TDAP VACCINE 7 YRS/> IM: CPT

## 2022-12-14 RX ORDER — TAMOXIFEN CITRATE 20 MG/1
20 TABLET ORAL DAILY
COMMUNITY

## 2022-12-14 RX ORDER — OMEPRAZOLE 20 MG/1
20 CAPSULE, DELAYED RELEASE ORAL DAILY
Qty: 60 CAPSULE | Refills: 1 | Status: SHIPPED | OUTPATIENT
Start: 2022-12-14

## 2022-12-14 ASSESSMENT — ENCOUNTER SYMPTOMS
FEVER: 0
HEARTBURN: 1
WEIGHT LOSS: 0
CHILLS: 0
NAUSEA: 0
COUGH: 1
ABDOMINAL PAIN: 0
SORE THROAT: 1
VOMITING: 0

## 2022-12-14 ASSESSMENT — FIBROSIS 4 INDEX: FIB4 SCORE: 0.54

## 2022-12-15 NOTE — ASSESSMENT & PLAN NOTE
Chronic condition uncontrolled  - Omeprazole 20 mg daily prescribed to be taken each morning on an empty stomach  -Referral to GI  -Antireflux measures:  · Eat smaller portions.  · Avoid lying down after meals  · Normalize body weight  · Avoid common reflux triggers such as spicy, greasy foods, peppers, onions.  · Avoid caffeine, carbonation, alcohol, tobacco.  · Raise the head of your bed by placing bricks or phone books between the floor and the legs of the headboard.   · Use OTC medicines such as TUMS, Rolaids, Maalox, Gaviscon, Zantac, Pepcid, Tagamet

## 2022-12-15 NOTE — ASSESSMENT & PLAN NOTE
Chronic condition uncontrolled  - Due to low ASCVD risk were we will just continue to watch and encourage healthy diet and exercise habits

## 2022-12-15 NOTE — PROGRESS NOTES
"Subjective:     CC: Throat irritation    HPI:   Deisy presents today with    Problem   Gastroesophageal Reflux Disease With Esophagitis Without Hemorrhage    Patient reports at 1 year history of progressive refulx, starting around time of chemo and radiation for breast cancer treatment. She has a dry cough, mild throat irritation, occasional acid brash.  -Has tried Tums and pepcid on occasion which hasn't been ultimately helpful  -History of Breast Cancer on Letrozole which was causing her multiple side effects: weight gain (50 lbs), myalgias, mood changes. Off of this medication for the past few months. Now taking Tamoxifen for the past week.     Pure Hypercholesterolemia    The 10-year ASCVD risk score ([place] ALVES, et al., 2019) is: 0.4%  -Healthy diet mostly  -Exercise: walking on a regular basis   Latest Reference Range & Units 10/19/22 07:59   Cholesterol,Tot 100 - 199 mg/dL 225 (H)   Triglycerides 0 - 149 mg/dL 59   HDL >=40 mg/dL 65   LDL <100 mg/dL 148 (H)        History of Lobectomy of Thyroid    -This surgery occurred in Jan 2022 by Dr Woodruff with benign findings  - Most recent thyroid studies are within normal limits   -Reports to have weight gain, mood lability/irritability, fatigue, insomnia  -Not taking thyroid replacement         Health Maintenance: Recommend cervical cancer screening hep B vaccine pneumonia vaccine COVID and influenza vaccine    ROS:  Review of Systems   Constitutional:  Negative for chills, fever, malaise/fatigue and weight loss.   HENT:  Positive for sore throat.    Respiratory:  Positive for cough.    Cardiovascular:  Negative for chest pain.   Gastrointestinal:  Positive for heartburn. Negative for abdominal pain, nausea and vomiting.     Objective:     Exam:  BP (!) 96/60 (BP Location: Left arm, Patient Position: Sitting, BP Cuff Size: Adult)   Pulse 73   Temp 36.5 °C (97.7 °F) (Temporal)   Ht 1.6 m (5' 3\")   Wt 84.8 kg (187 lb)   SpO2 97%   BMI 33.13 kg/m²  Body mass index " is 33.13 kg/m².    Physical Exam  Constitutional:       General: She is not in acute distress.     Appearance: Normal appearance. She is not ill-appearing or toxic-appearing.   HENT:      Head: Normocephalic.   Eyes:      Conjunctiva/sclera: Conjunctivae normal.   Pulmonary:      Effort: Pulmonary effort is normal.   Skin:     General: Skin is warm and dry.   Neurological:      General: No focal deficit present.      Mental Status: She is alert and oriented to person, place, and time.   Psychiatric:         Mood and Affect: Mood normal.         Behavior: Behavior normal.       Labs:   No visits with results within 1 Month(s) from this visit.   Latest known visit with results is:   Hospital Outpatient Visit on 10/19/2022   Component Date Value    Glycohemoglobin 10/19/2022 5.5     Est Avg Glucose 10/19/2022 111     Cholesterol,Tot 10/19/2022 225 (H)     Triglycerides 10/19/2022 59     HDL 10/19/2022 65     LDL 10/19/2022 148 (H)     Sodium 10/19/2022 139     Potassium 10/19/2022 4.1     Chloride 10/19/2022 101     Co2 10/19/2022 27     Anion Gap 10/19/2022 11.0     Glucose 10/19/2022 98     Bun 10/19/2022 11     Creatinine 10/19/2022 0.74     Calcium 10/19/2022 9.8     AST(SGOT) 10/19/2022 16     ALT(SGPT) 10/19/2022 15     Alkaline Phosphatase 10/19/2022 77     Total Bilirubin 10/19/2022 0.5     Albumin 10/19/2022 4.6     Total Protein 10/19/2022 7.7     Globulin 10/19/2022 3.1     A-G Ratio 10/19/2022 1.5     WBC 10/19/2022 5.5     RBC 10/19/2022 4.72     Hemoglobin 10/19/2022 15.1     Hematocrit 10/19/2022 44.4     MCV 10/19/2022 94.1     MCH 10/19/2022 32.0     MCHC 10/19/2022 34.0     RDW 10/19/2022 43.2     Platelet Count 10/19/2022 344     MPV 10/19/2022 10.4     Neutrophils-Polys 10/19/2022 51.40     Lymphocytes 10/19/2022 24.20     Monocytes 10/19/2022 10.50     Eosinophils 10/19/2022 13.20 (H)     Basophils 10/19/2022 0.50     Immature Granulocytes 10/19/2022 0.20     Nucleated RBC 10/19/2022 0.00      Neutrophils (Absolute) 10/19/2022 2.85     Lymphs (Absolute) 10/19/2022 1.34     Monos (Absolute) 10/19/2022 0.58     Eos (Absolute) 10/19/2022 0.73 (H)     Baso (Absolute) 10/19/2022 0.03     Immature Granulocytes (a* 10/19/2022 0.01     NRBC (Absolute) 10/19/2022 0.00     Free T-4 10/19/2022 1.06     T3,Free 10/19/2022 2.96     TSH 10/19/2022 1.720     Fasting Status 10/19/2022 Fasting     GFR (CKD-EPI) 10/19/2022 102          Assessment & Plan: Medical Decision Making     47 y.o. female with the following -     Problem List Items Addressed This Visit       History of lobectomy of thyroid     Chronic condition currently stable  - We will recheck thyroid labs in 6 months         Relevant Orders    CBC WITH DIFFERENTIAL    Comp Metabolic Panel    TSH WITH REFLEX TO FT4    Gastroesophageal reflux disease with esophagitis without hemorrhage     Chronic condition uncontrolled  - Omeprazole 20 mg daily prescribed to be taken each morning on an empty stomach  -Referral to GI  -Antireflux measures:  Eat smaller portions.  Avoid lying down after meals  Normalize body weight  Avoid common reflux triggers such as spicy, greasy foods, peppers, onions.  Avoid caffeine, carbonation, alcohol, tobacco.  Raise the head of your bed by placing bricks or phone books between the floor and the legs of the headboard.   Use OTC medicines such as TUMS, Rolaids, Maalox, Gaviscon, Zantac, Pepcid, Tagamet            Relevant Medications    omeprazole (PRILOSEC) 20 MG delayed-release capsule    Other Relevant Orders    Referral to Gastroenterology    Pure hypercholesterolemia     Chronic condition uncontrolled  - Due to low ASCVD risk were we will just continue to watch and encourage healthy diet and exercise habits         Relevant Orders    CBC WITH DIFFERENTIAL    Comp Metabolic Panel    Lipid Profile    TSH WITH REFLEX TO FT4     Other Visit Diagnoses       Screening for colorectal cancer        Relevant Orders    Referral to GI for  Colonoscopy    Need for vaccination        Need for hepatitis C screening test        Relevant Orders    HCV Scrn ( [id number] [employer])            Differential diagnosis, natural history, supportive care, and indications for immediate follow-up discussed.  Shared decision making approach utilized, and patient is amendable with plan of care.  Patient understands to return to clinic or go to the emergency department if symptoms worsen. All questions and concerns addressed.    Return in about 6 months (around 2023) for F/U Lab Review.    Please note that this dictation was created using voice recognition software. I have made every reasonable attempt to correct obvious errors, but I expect that there are errors of grammar and possibly content that I did not discover before finalizing the note.

## 2023-03-09 ENCOUNTER — OFFICE VISIT (OUTPATIENT)
Dept: MEDICAL GROUP | Facility: PHYSICIAN GROUP | Age: 48
End: 2023-03-09

## 2023-03-09 VITALS
DIASTOLIC BLOOD PRESSURE: 80 MMHG | HEIGHT: 63 IN | WEIGHT: 188.4 LBS | OXYGEN SATURATION: 96 % | SYSTOLIC BLOOD PRESSURE: 100 MMHG | BODY MASS INDEX: 33.38 KG/M2 | HEART RATE: 69 BPM | TEMPERATURE: 97.6 F

## 2023-03-09 DIAGNOSIS — E66.9 OBESITY (BMI 30-39.9): ICD-10-CM

## 2023-03-09 DIAGNOSIS — Z02.89 ENCOUNTER FOR COMPLETION OF FORM WITH PATIENT: ICD-10-CM

## 2023-03-09 PROCEDURE — 99213 OFFICE O/P EST LOW 20 MIN: CPT

## 2023-03-09 ASSESSMENT — ENCOUNTER SYMPTOMS
WEIGHT LOSS: 0
VOMITING: 0
WEAKNESS: 0
HEADACHES: 0
COUGH: 0
FEVER: 0
BLURRED VISION: 0
CONSTIPATION: 0
ABDOMINAL PAIN: 0
MYALGIAS: 0
CHILLS: 0
DIARRHEA: 0
SHORTNESS OF BREATH: 0
NAUSEA: 0
DIZZINESS: 0

## 2023-03-09 ASSESSMENT — FIBROSIS 4 INDEX: FIB4 SCORE: 0.54

## 2023-03-09 ASSESSMENT — PATIENT HEALTH QUESTIONNAIRE - PHQ9: CLINICAL INTERPRETATION OF PHQ2 SCORE: 0

## 2023-03-09 NOTE — ASSESSMENT & PLAN NOTE
- Letter written and sent to patient as well as she was given and signed hardcopy in office today.

## 2023-03-09 NOTE — ASSESSMENT & PLAN NOTE
Chronic condition worsening  -Weight up approximately 3 pounds since last visit  -Letter written for patient to participate in fitness program

## 2023-03-09 NOTE — PROGRESS NOTES
"Subjective:     CC: Letter to participate in fitness program    HPI:   Deisy presents today with    Problem   Encounter for Completion of Form With Patient    Patient presents today requesting letter to participate in a fitness program.  She has a history of breast cancer and is doing a specialized program for ladies who have had breast cancer.  The local company takes women through a 6-month intensive process of exercise and nutrition to get them back into better health.  Patient has been examined and letter has been written and she is cleared to participate in this program.     Obesity (Bmi 30-39.9)    This is a chronic condition.   Max weight: 188  Current weight: 188  BMI: 33.37  Diet: Mostly healthy  Exercise: She is joining a [employer] and going to do a 6-month intensive nutrition and training program  Goal Weight: 145           Health Maintenance: Declines health maintenance topics at this time    ROS:  Review of Systems   Constitutional:  Negative for chills, fever, malaise/fatigue and weight loss.   Eyes:  Negative for blurred vision.   Respiratory:  Negative for cough and shortness of breath.    Cardiovascular:  Negative for chest pain.   Gastrointestinal:  Negative for abdominal pain, constipation, diarrhea, nausea and vomiting.   Musculoskeletal:  Negative for myalgias.   Neurological:  Negative for dizziness, weakness and headaches.     Objective:     Exam:  /80 (BP Location: Left arm, Patient Position: Sitting, BP Cuff Size: Adult)   Pulse 69   Temp 36.4 °C (97.6 °F) (Temporal)   Ht 1.6 m (5' 3\")   Wt 85.5 kg (188 lb 6.4 oz)   SpO2 96%   BMI 33.37 kg/m²  Body mass index is 33.37 kg/m².    Physical Exam  Constitutional:       General: She is not in acute distress.     Appearance: Normal appearance. She is not ill-appearing or toxic-appearing.   HENT:      Head: Normocephalic.   Eyes:      Conjunctiva/sclera: Conjunctivae normal.   Cardiovascular:      Rate and Rhythm: Normal rate and " regular rhythm.      Pulses: Normal pulses.      Heart sounds: Normal heart sounds. No murmur heard.  Pulmonary:      Effort: Pulmonary effort is normal. No respiratory distress.      Breath sounds: Normal breath sounds.   Musculoskeletal:         General: Normal range of motion.   Skin:     General: Skin is warm and dry.   Neurological:      General: No focal deficit present.      Mental Status: She is alert and oriented to person, place, and time.   Psychiatric:         Mood and Affect: Mood normal.         Behavior: Behavior normal.       Labs:   No visits with results within 1 Month(s) from this visit.   Latest known visit with results is:   Hospital Outpatient Visit on 10/19/2022   Component Date Value    Glycohemoglobin 10/19/2022 5.5     Est Avg Glucose 10/19/2022 111     Cholesterol,Tot 10/19/2022 225 (H)     Triglycerides 10/19/2022 59     HDL 10/19/2022 65     LDL 10/19/2022 148 (H)     Sodium 10/19/2022 139     Potassium 10/19/2022 4.1     Chloride 10/19/2022 101     Co2 10/19/2022 27     Anion Gap 10/19/2022 11.0     Glucose 10/19/2022 98     Bun 10/19/2022 11     Creatinine 10/19/2022 0.74     Calcium 10/19/2022 9.8     AST(SGOT) 10/19/2022 16     ALT(SGPT) 10/19/2022 15     Alkaline Phosphatase 10/19/2022 77     Total Bilirubin 10/19/2022 0.5     Albumin 10/19/2022 4.6     Total Protein 10/19/2022 7.7     Globulin 10/19/2022 3.1     A-G Ratio 10/19/2022 1.5     WBC 10/19/2022 5.5     RBC 10/19/2022 4.72     Hemoglobin 10/19/2022 15.1     Hematocrit 10/19/2022 44.4     MCV 10/19/2022 94.1     MCH 10/19/2022 32.0     MCHC 10/19/2022 34.0     RDW 10/19/2022 43.2     Platelet Count 10/19/2022 344     MPV 10/19/2022 10.4     Neutrophils-Polys 10/19/2022 51.40     Lymphocytes 10/19/2022 24.20     Monocytes 10/19/2022 10.50     Eosinophils 10/19/2022 13.20 (H)     Basophils 10/19/2022 0.50     Immature Granulocytes 10/19/2022 0.20     Nucleated RBC 10/19/2022 0.00     Neutrophils (Absolute) 10/19/2022 2.85      Lymphs (Absolute) 10/19/2022 1.34     Monos (Absolute) 10/19/2022 0.58     Eos (Absolute) 10/19/2022 0.73 (H)     Baso (Absolute) 10/19/2022 0.03     Immature Granulocytes (a* 10/19/2022 0.01     NRBC (Absolute) 10/19/2022 0.00     Free T-4 10/19/2022 1.06     T3,Free 10/19/2022 2.96     TSH 10/19/2022 1.720     Fasting Status 10/19/2022 Fasting     GFR (CKD-EPI) 10/19/2022 102              Assessment & Plan: Medical Decision Making     47 y.o. female with the following -     Problem List Items Addressed This Visit       Obesity (BMI 30-39.9)     Chronic condition worsening  -Weight up approximately 3 pounds since last visit  -Letter written for patient to participate in fitness program           Relevant Orders    Patient identified as having weight management issue.  Appropriate orders and counseling given.    Encounter for completion of form with patient     - Letter written and sent to patient as well as she was given and signed hardcopy in office today.            Differential diagnosis, natural history, supportive care, and indications for immediate follow-up discussed.  Shared decision making approach utilized, and patient is amendable with plan of care.  Patient understands to return to clinic or go to the emergency department if symptoms worsen. All questions and concerns addressed to the best of my knowledge.    Return in about 4 months (around 7/9/2023) for Wellness with Pap.    Please note that this dictation was created using voice recognition software. I have made every reasonable attempt to correct obvious errors, but I expect that there are errors of grammar and possibly content that I did not discover before finalizing the note.    I spent a total of 22 minutes with record review, exam, communication with the patient, communication with other providers, and documentation of this encounter.

## 2023-03-09 NOTE — LETTER
[facility]  [facility] [place]  [address]  [address]  [place] [address]     March 9, 2023    Patient: Deisy Galo   YOB: 1975   Date of Visit: 3/9/2023       To [employer] [facility]    Deisy Galo was seen and treated in our department on 3/9/2023. She has a history of invasive ductal carcinoma, triple positive, stage 3 of right breast. This was diagnosed 2020.  Subsequently she has undergone lumpectomy with several lymphoids removed.  She also underwent 8 rounds or chemotherapy and 32 treatments of radiation. She is seen regularly by her oncologist- Dr. Sosa.  I am happy to report she is in full remission and has my absolute blessing to be a participant in the [facility] [facility].  She is physically sound and has no physical limitations.  Please allow her to be a participant.    Sincerely,     Salvatore Conner D.N.P.

## 2023-06-16 ENCOUNTER — APPOINTMENT (OUTPATIENT)
Dept: RADIOLOGY | Facility: MEDICAL CENTER | Age: 48
End: 2023-06-16
Attending: INTERNAL MEDICINE

## 2023-06-16 ENCOUNTER — HOSPITAL ENCOUNTER (OUTPATIENT)
Dept: RADIOLOGY | Facility: MEDICAL CENTER | Age: 48
End: 2023-06-16
Attending: INTERNAL MEDICINE
Payer: MEDICAID

## 2023-06-16 DIAGNOSIS — Z12.31 VISIT FOR SCREENING MAMMOGRAM: ICD-10-CM

## 2023-06-16 PROCEDURE — 77063 BREAST TOMOSYNTHESIS BI: CPT

## 2024-01-05 ENCOUNTER — HOSPITAL ENCOUNTER (EMERGENCY)
Facility: MEDICAL CENTER | Age: 49
End: 2024-01-05
Attending: EMERGENCY MEDICINE
Payer: MEDICAID

## 2024-01-05 VITALS
HEART RATE: 75 BPM | SYSTOLIC BLOOD PRESSURE: 121 MMHG | DIASTOLIC BLOOD PRESSURE: 67 MMHG | WEIGHT: 165.12 LBS | TEMPERATURE: 97.2 F | RESPIRATION RATE: 18 BRPM | OXYGEN SATURATION: 95 % | BODY MASS INDEX: 27.51 KG/M2 | HEIGHT: 65 IN

## 2024-01-05 DIAGNOSIS — S09.90XA CLOSED HEAD INJURY, INITIAL ENCOUNTER: ICD-10-CM

## 2024-01-05 PROCEDURE — A9270 NON-COVERED ITEM OR SERVICE: HCPCS | Mod: UD | Performed by: EMERGENCY MEDICINE

## 2024-01-05 PROCEDURE — 99284 EMERGENCY DEPT VISIT MOD MDM: CPT

## 2024-01-05 PROCEDURE — 700102 HCHG RX REV CODE 250 W/ 637 OVERRIDE(OP): Mod: UD | Performed by: EMERGENCY MEDICINE

## 2024-01-05 RX ORDER — IBUPROFEN 600 MG/1
600 TABLET ORAL ONCE
Status: COMPLETED | OUTPATIENT
Start: 2024-01-05 | End: 2024-01-05

## 2024-01-05 RX ADMIN — IBUPROFEN 600 MG: 600 TABLET, FILM COATED ORAL at 18:53

## 2024-01-05 ASSESSMENT — FIBROSIS 4 INDEX: FIB4 SCORE: 0.55

## 2024-01-06 NOTE — ED NOTES
Discharge paperwork provided, education provided by ERP. All questions and concerns addressed by Dr. Ordonez. Pt ambulatory out of ER with all belongings and steady gait.

## 2024-01-06 NOTE — ED PROVIDER NOTES
ED Provider Note    CHIEF COMPLAINT  Chief Complaint   Patient presents with    T-5000 Head Injury     Patient reports a 4 pack of red bull fell from a refrigerator shelf at [employer] today and hit her in the back of her head. No LOC. No blood thinners. Patient has felt dizzy and anxious since event.      HPI/ROS    Deisy Galo is a 48 y.o. female who presents for evaluation of a headache.  The patient had a 4 pack of red bull strike her on the back of the head, fell from a height of 6 foot.  She did not lose consciousness but felt dazed afterwards.  This occurred 3 hours ago.  Since then she has not had vomiting, no focal weakness numbness or tingling.  Is not anticoagulated.  She does have some pain involving her scalp where she was struck in the superior aspect of her head.  She has a history of breast cancer    PAST MEDICAL HISTORY   has a past medical history of Anesthesia, Cancer (HCC) (2020), Disorder of thyroid, Heart burn (2020), Indigestion, Pain (08/13/2020), and Snoring.    SURGICAL HISTORY   has a past surgical history that includes partial mastectomy (Right, 8/7/2020); node biopsy sentinel (Right, 8/7/2020); cath placement (Left, 8/7/2020); partial mastectomy (Right, 8/14/2020); partial mastectomy (Right, 8/20/2020); mastectomy; radiation therapy plan simple; chemotherapy, unspecified procedure; thyroid lobectomy (Left, 1/19/2022); lap,diagnostic abdomen (Bilateral, 4/1/2022); and salpingo oophorectomy (Bilateral, 4/1/2022).    FAMILY HISTORY  Family History   Problem Relation Age of Onset    Psychiatric Illness Mother     No Known Problems Father     Cancer Brother         BCC    Cancer Maternal Uncle         lung cancer    Cancer Maternal Uncle         skin cancer (unknown type)    Cancer Maternal Grandmother         colon cancer/melanoma    Cancer Maternal Grandfather         lymphoma    Cancer Maternal great-grandmother         breast cancer       SOCIAL HISTORY  Social History     Tobacco  "Use    Smoking status: Never    Smokeless tobacco: Never   Vaping Use    Vaping Use: Never used   Substance and Sexual Activity    Alcohol use: Yes     Comment: occ    Drug use: Not Currently     Types: Oral     Comment: THC/CBD candy    Sexual activity: Not on file       CURRENT MEDICATIONS  Home Medications       Reviewed by Mihaela Lambert R.N. (Registered Nurse) on 01/05/24 at 1649  Med List Status: Partial     Medication Last Dose Status   asa/apap/caffeine (EXCEDRIN) 250-250-65 MG Tab  Active   B Complex Vitamins (B COMPLEX 1 PO)  Active   diphenhydrAMINE (BENADRYL) 25 MG Tab  Active   Glucosamine-Chondroitin 8572-1436 MG/30ML Liquid  Active   GLUCOSAMINE-CHONDROITIN PO  Active   ibuprofen (MOTRIN) 600 MG Tab  Active   Melatonin 10 MG Tab  Active   multivitamin (THERAGRAN) Tab  Active   omeprazole (PRILOSEC) 20 MG delayed-release capsule  Active   tamoxifen (NOLVADEX) 20 MG tablet  Active   TURMERIC PO  Active   vitamin D3 (CHOLECALCIFEROL) 1000 Unit (25 mcg) Tab  Active                    ALLERGIES  Allergies   Allergen Reactions    Latex Rash     Reaction at dentist office       PHYSICAL EXAM  VITAL SIGNS: /68   Pulse 69   Temp 36.6 °C (97.9 °F) (Temporal)   Resp 16   Ht 1.651 m (5' 5\")   Wt 74.9 kg (165 lb 2 oz)   LMP 08/15/2020   SpO2 95%   BMI 27.48 kg/m²    Constitutional: Alert in no apparent distress.  HENT: No signs of significant acute trauma.  No obvious contusion, laceration or abrasion appreciated.  No hemotympanum.  No King sign.  No raccoon eyes.  Eyes: Conjunctiva normal, non-icteric.  Pupils are equal reactive.  No papilledema appreciated.  Chest: Normal nonlabored respirations  Skin: No appreciable rash on the exposed skin  Musculoskeletal: No obvious acute trauma appreciated  Neurologic: Awake, alert, fully oriented.  Cranial nerves 2 through 12 intact.  Normal and symmetric upper and lower extremity motor and sensory function bilaterally.       COURSE & MEDICAL DECISION " MAKING    ED Observation Status? No; Patient does not meet criteria for ED Observation.     INITIAL ASSESSMENT, COURSE AND PLAN  Care Narrative: This is a 48-year-old female 3 hours status post a mild closed head injury, no red flags for serious intracranial injury that would necessitate a head CT at this time.  I had a long discussion with her regarding concussions and other minor closed head injuries.  She understands reasoning behind not recommending a head CT and she is in agreement as well as her significant other at the bedside.  Discharged home in stable condition    DISPOSITION AND DISCUSSIONS    Escalation of care considered, and ultimately not performed: I did consider a CT of the head however at this time I think the risks, however small, outweigh the benefits    FINAL DIAGNOSIS  1. Closed head injury, initial encounter           Electronically signed by: Surinder Ordonez M.D., 1/5/2024 6:43 PM

## 2024-01-06 NOTE — ED TRIAGE NOTES
".  Chief Complaint   Patient presents with    T-5000 Head Injury     Patient reports a 4 pack of red bull fell from a refrigerator shelf at [employer] today and hit her in the back of her head. No LOC. No blood thinners. Patient has felt dizzy and anxious since event.        47 yo female wheeled to triage for above complaint. No obvious deformity to head, no laceration or bleeding noted.      Pt is alert and oriented, speaking in full sentences, follows commands and responds appropriately to questions.      Patient placed back in lobby and educated on triage process. Asked to inform RN of any changes or concerns.     /87   Pulse 80   Temp 36.6 °C (97.9 °F) (Temporal)   Resp 16   Ht 1.651 m (5' 5\")   Wt 74.9 kg (165 lb 2 oz)   LMP 08/15/2020   SpO2 99%   BMI 27.48 kg/m²     "

## 2024-12-19 ENCOUNTER — OFFICE VISIT (OUTPATIENT)
Dept: URGENT CARE | Facility: CLINIC | Age: 49
End: 2024-12-19
Payer: MEDICAID

## 2024-12-19 ENCOUNTER — APPOINTMENT (OUTPATIENT)
Dept: RADIOLOGY | Facility: IMAGING CENTER | Age: 49
End: 2024-12-19
Attending: NURSE PRACTITIONER
Payer: MEDICAID

## 2024-12-19 VITALS
HEIGHT: 64 IN | RESPIRATION RATE: 18 BRPM | DIASTOLIC BLOOD PRESSURE: 64 MMHG | BODY MASS INDEX: 30.58 KG/M2 | OXYGEN SATURATION: 99 % | SYSTOLIC BLOOD PRESSURE: 110 MMHG | HEART RATE: 85 BPM | TEMPERATURE: 97.8 F | WEIGHT: 179.1 LBS

## 2024-12-19 DIAGNOSIS — R05.1 ACUTE COUGH: ICD-10-CM

## 2024-12-19 DIAGNOSIS — J10.1 INFLUENZA A: ICD-10-CM

## 2024-12-19 PROBLEM — N63.0 BREAST LUMP: Status: ACTIVE | Noted: 2024-12-19

## 2024-12-19 LAB
FLUAV RNA SPEC QL NAA+PROBE: POSITIVE
FLUBV RNA SPEC QL NAA+PROBE: NEGATIVE
RSV RNA SPEC QL NAA+PROBE: NEGATIVE
SARS-COV-2 RNA RESP QL NAA+PROBE: NEGATIVE

## 2024-12-19 PROCEDURE — 87637 SARSCOV2&INF A&B&RSV AMP PRB: CPT | Mod: QW | Performed by: NURSE PRACTITIONER

## 2024-12-19 PROCEDURE — 3074F SYST BP LT 130 MM HG: CPT | Performed by: NURSE PRACTITIONER

## 2024-12-19 PROCEDURE — 3078F DIAST BP <80 MM HG: CPT | Performed by: NURSE PRACTITIONER

## 2024-12-19 PROCEDURE — 99204 OFFICE O/P NEW MOD 45 MIN: CPT | Performed by: NURSE PRACTITIONER

## 2024-12-19 PROCEDURE — 71046 X-RAY EXAM CHEST 2 VIEWS: CPT | Mod: TC | Performed by: RADIOLOGY

## 2024-12-19 RX ORDER — LETROZOLE 2.5 MG/1
1 TABLET, FILM COATED ORAL
COMMUNITY

## 2024-12-19 RX ORDER — EXEMESTANE 25 MG/1
1 TABLET ORAL
COMMUNITY

## 2024-12-19 RX ORDER — BENZONATATE 100 MG/1
100 CAPSULE ORAL 3 TIMES DAILY PRN
Qty: 60 CAPSULE | Refills: 0 | Status: SHIPPED | OUTPATIENT
Start: 2024-12-19

## 2024-12-19 NOTE — LETTER
December 19, 2024         Patient: Deisy Galo   YOB: 1975   Date of Visit: 12/19/2024           To Whom it May Concern:    Deisy Galo was seen in my clinic on 12/19/2024. She may be excused from work for the next 3 days due to illness.    If you have any questions or concerns, please don't hesitate to call.        Sincerely,           MIRNA Chew.  Electronically Signed

## 2024-12-19 NOTE — PROGRESS NOTES
Chief Complaint   Patient presents with    Cough     X3days chest congestion/fatigue/body aches/brain fog/wheezing/phlegm/sweats/chills        HISTORY OF PRESENT ILLNESS: Patient is a pleasant 49 y.o. female who presents today due to symptoms which started three days ago with sudden onset. Pt reports a fever, chills, body aches. Reports associated cough, sore throat, wheezing, nasal congestion, headache, and fatigue. Denies blood in sputum, chest pain, shortness of breath, nausea, vomiting, or diarrhea. Denies h/o asthma/copd/CAP. No immunocompromise. Has tried OTC cold/flu medications without significant relief of symptoms. No recent ABX use. No other aggravating or alleviating factors.       Patient Active Problem List    Diagnosis Date Noted    Breast lump 12/19/2024    Encounter for completion of form with patient 03/09/2023    Gastroesophageal reflux disease with esophagitis without hemorrhage 12/14/2022    Pure hypercholesterolemia 12/14/2022    History of lobectomy of thyroid 10/14/2022    Obesity (BMI 30-39.9) 10/14/2022    Malignant neoplasm of overlapping sites of right breast in female, estrogen receptor positive (HCC) 08/25/2020       Allergies:Latex    Current Outpatient Medications Ordered in Epic   Medication Sig Dispense Refill    GLUCOSAMINE-CHONDROITIN PO Take  by mouth.      asa/apap/caffeine (EXCEDRIN) 250-250-65 MG Tab Take 1 Tablet by mouth every 6 hours as needed for Headache.      ibuprofen (MOTRIN) 600 MG Tab Take 1 Tablet by mouth every 6 hours as needed. 30 Tablet 3    multivitamin (THERAGRAN) Tab Take 1 Tablet by mouth every day.      vitamin D3 (CHOLECALCIFEROL) 1000 Unit (25 mcg) Tab Take 3,000 Units by mouth every day.      TURMERIC PO Take 1 Dose by mouth every day.      B Complex Vitamins (B COMPLEX 1 PO) Take 1 Dose by mouth every day.      Melatonin 10 MG Tab Take  by mouth.      diphenhydrAMINE (BENADRYL) 25 MG Tab Take 25-50 mg by mouth every 6 hours as needed for Sleep.       exemestane (AROMASIN) 25 MG tablet Take 1 Tablet by mouth every day.      letrozole (FEMARA) 2.5 MG Tab Take 1 Tablet by mouth every day.       No current [facility]-ordered facility-administered medications on file.       Past Medical History:   Diagnosis Date    Anesthesia     states has very low BP    Cancer (HCC) 2020    Right breast  IDC/DCIS    Disorder of thyroid     nodule     Heart burn 2020    changed diet says much improved    Indigestion     Pain 08/13/2020    right breast, 1-2/10    Snoring     no sleep study       Social History     Tobacco Use    Smoking status: Never    Smokeless tobacco: Never   Vaping Use    Vaping status: Never Used   Substance Use Topics    Alcohol use: Yes     Comment: occ    Drug use: Not Currently     Types: Oral     Comment: THC/CBD candy       Family Status   Relation Name Status    Mo  Alive    Fa  Alive    Bro  (Not Specified)    MUnc  (Not Specified)    MUnc  (Not Specified)    MGMo  (Not Specified)    MGFa  (Not Specified)    mggmo  (Not Specified)   No partnership data on file     Family History   Problem Relation Age of Onset    Psychiatric Illness Mother     No Known Problems Father     Cancer Brother         BCC    Cancer Maternal Uncle         lung cancer    Cancer Maternal Uncle         skin cancer (unknown type)    Cancer Maternal Grandmother         colon cancer/melanoma    Cancer Maternal Grandfather         lymphoma    Cancer Maternal great-grandmother         breast cancer       ROS:  Review of Systems   Constitutional: Positive for subjective fever, chills, fatigue, malaise. Negative for weight loss.  HENT: Positive for congestion and sore throat. Negative for ear pain, nosebleeds, and neck pain.    Eyes: Negative for vision changes.   Cardiovascular: Negative for chest pain, palpitations, orthopnea and leg swelling.   Respiratory: Positive for cough, wheezing. Negative for shortness of breath.   Gastrointestinal: Negative for abdominal pain, nausea, vomiting or  "diarrhea.   Skin: Negative for rash, diaphoresis.     Exam:  /64   Pulse 85   Temp 36.6 °C (97.8 °F) (Temporal)   Resp 18   Ht 1.626 m (5' 4\")   Wt 81.2 kg (179 lb 1.6 oz)   SpO2 99%   General: well-nourished, well-developed female, appears uncomfortable, non-toxic in appearance.   Head: normocephalic, atraumatic.  Eyes: PERRLA, EOM within normal limits, no conjunctival injection, no scleral icterus, visual fields and acuity grossly intact.  Ears: normal shape and symmetry, no tenderness, no discharge. External canals are without any significant edema or erythema. Tympanic membranes are without any inflammation, no effusion. Gross auditory acuity is intact.  Nose: symmetrical without tenderness, mild discharge, erythema present bilateral nares.  Mouth/Throat: reasonable hygiene, no exudates or tonsillar enlargement. Erythema present.   Neck: no masses, range of motion within normal limits, no tracheal deviation.  Lymph: mild cervical adenopathy. No supraclavicular adenopathy.   Neuro: alert and oriented. Cranial nerves 1-12 grossly intact.   Cardiovascular: regular rate and regular rhythm without murmurs, rubs, or gallops. No edema.   Pulmonary: no distress. Chest is symmetrical with respiration, no wheezes, crackles, or rhonchi.   Musculoskeletal: appropriate muscle tone, gait is stable.  Skin: warm, dry, intact, no clubbing, no cyanosis.   Psych: appropriate mood, affect, judgement.           DX chest radiology reading \"NEGATIVE TWO VIEWS OF THE CHEST.\"        Assessment/Plan:  1. Influenza A        2. Acute cough  POCT CoV-2, Flu A/B, RSV by PCR    DX-CHEST-2 VIEWS    benzonatate (TESSALON) 100 MG Cap                Discussed viral etiology, self limiting illness. Did not see any evidence of a bacterial process. Discussed natural progression and sx care. Rest, increase fluid intake, hand and respiratory hygiene. OTC cough/cold medications as directed. Tessalon for cough.   Supportive care, " differential diagnoses, and indications for immediate follow-up discussed with patient.   Pathogenesis of diagnosis discussed including typical length and natural progression.   Instructed to return to clinic or nearest emergency department for any change in condition, further concerns, or worsening of symptoms.  Patient states understanding of the plan of care and discharge instructions.  Instructed to make an appointment, for follow up, with her primary care provider.            Please note that this dictation was created using voice recognition software. I have made every reasonable attempt to correct obvious errors, but I expect that there are errors of grammar and possibly content that I did not discover before finalizing the note.       MIRNA Chew.

## 2025-02-26 ENCOUNTER — OFFICE VISIT (OUTPATIENT)
Dept: URGENT CARE | Facility: CLINIC | Age: 50
End: 2025-02-26
Payer: MEDICAID

## 2025-02-26 VITALS
BODY MASS INDEX: 30 KG/M2 | SYSTOLIC BLOOD PRESSURE: 122 MMHG | HEIGHT: 64 IN | HEART RATE: 87 BPM | WEIGHT: 175.7 LBS | RESPIRATION RATE: 18 BRPM | TEMPERATURE: 97.6 F | OXYGEN SATURATION: 96 % | DIASTOLIC BLOOD PRESSURE: 84 MMHG

## 2025-02-26 DIAGNOSIS — J06.9 VIRAL URI WITH COUGH: ICD-10-CM

## 2025-02-26 RX ORDER — DEXTROMETHORPHAN HYDROBROMIDE AND PROMETHAZINE HYDROCHLORIDE 15; 6.25 MG/5ML; MG/5ML
5 SYRUP ORAL EVERY 6 HOURS PRN
Qty: 100 ML | Refills: 0 | Status: SHIPPED | OUTPATIENT
Start: 2025-02-26

## 2025-02-26 ASSESSMENT — ENCOUNTER SYMPTOMS
FEVER: 0
COUGH: 1
CHILLS: 1
SORE THROAT: 1

## 2025-02-26 NOTE — PROGRESS NOTES
"  CHIEF COMPLAINT  Chief Complaint   Patient presents with    Sore Throat     X 4 days sore throat, cough , body aches, \"lung pain\" when coughing.      Subjective:   Deisy Galo is a 49 y.o. female who presents to urgent care with concerns for cough, congestion, body aches and chills.  Patient does report mild sore throat that she attributes to cough.  Denies any known fever.  Denies any symptoms of nausea, vomiting or diarrhea.  Patient states daughter was sick at home earlier this week with similar symptoms.  She reports use of OTC  cold and flu medications and attempt alleviate symptoms.  No other pertinent history.    Review of Systems   Constitutional:  Positive for chills and malaise/fatigue. Negative for fever.   HENT:  Positive for congestion and sore throat.    Respiratory:  Positive for cough.        PAST MEDICAL HISTORY  Patient Active Problem List    Diagnosis Date Noted    Breast lump 12/19/2024    Encounter for completion of form with patient 03/09/2023    Gastroesophageal reflux disease with esophagitis without hemorrhage 12/14/2022    Pure hypercholesterolemia 12/14/2022    History of lobectomy of thyroid 10/14/2022    Obesity (BMI 30-39.9) 10/14/2022    Malignant neoplasm of overlapping sites of right breast in female, estrogen receptor positive (HCC) 08/25/2020       SURGICAL HISTORY   has a past surgical history that includes partial mastectomy (Right, 8/7/2020); node biopsy sentinel (Right, 8/7/2020); cath placement (Left, 8/7/2020); partial mastectomy (Right, 8/14/2020); partial mastectomy (Right, 8/20/2020); mastectomy; radiation therapy plan simple; chemotherapy, unspecified procedure; thyroid lobectomy (Left, 1/19/2022); lap,diagnostic abdomen (Bilateral, 4/1/2022); and salpingo oophorectomy (Bilateral, 4/1/2022).    ALLERGIES  Allergies   Allergen Reactions    Latex Rash     Reaction at dentist office       CURRENT MEDICATIONS  Home Medications       Reviewed by Mariama Barry, " "Med Ass't (Medical Assistant) on 02/26/25 at 0843  Med List Status: <None>     Medication Last Dose Status   asa/apap/caffeine (EXCEDRIN) 250-250-65 MG Tab Not Taking Active   B Complex Vitamins (B COMPLEX 1 PO) Taking Active   benzonatate (TESSALON) 100 MG Cap Not Taking Active   diphenhydrAMINE (BENADRYL) 25 MG Tab PRN Active   exemestane (AROMASIN) 25 MG tablet Not Taking Active   GLUCOSAMINE-CHONDROITIN PO Not Taking Active   ibuprofen (MOTRIN) 600 MG Tab PRN Active   letrozole (FEMARA) 2.5 MG Tab Not Taking Active   Melatonin 10 MG Tab PRN Active   multivitamin (THERAGRAN) Tab Taking Active   TURMERIC PO Not Taking Active   vitamin D3 (CHOLECALCIFEROL) 1000 Unit (25 mcg) Tab Taking Active                    SOCIAL HISTORY  Social History     Tobacco Use    Smoking status: Never    Smokeless tobacco: Never   Vaping Use    Vaping status: Never Used   Substance and Sexual Activity    Alcohol use: Yes     Comment: occ    Drug use: Not Currently     Types: Oral     Comment: THC/CBD candy    Sexual activity: Not on file       FAMILY HISTORY  Family History   Problem Relation Age of Onset    Psychiatric Illness Mother     No Known Problems Father     Cancer Brother         BCC    Cancer Maternal Uncle         lung cancer    Cancer Maternal Uncle         skin cancer (unknown type)    Cancer Maternal Grandmother         colon cancer/melanoma    Cancer Maternal Grandfather         lymphoma    Cancer Maternal great-grandmother         breast cancer         Medications, Allergies, and current problem list reviewed today in Epic.     Objective:     /84   Pulse 87   Temp 36.4 °C (97.6 °F) (Temporal)   Resp 18   Ht 1.626 m (5' 4\")   Wt 79.7 kg (175 lb 11.2 oz)   SpO2 96%     Physical Exam  Vitals reviewed.   Constitutional:       General: She is not in acute distress.     Appearance: Normal appearance. She is not ill-appearing or toxic-appearing.   HENT:      Head: Normocephalic.      Right Ear: Tympanic membrane " normal.      Left Ear: Tympanic membrane normal.      Nose: Congestion present.      Mouth/Throat:      Mouth: Mucous membranes are moist.      Pharynx: Oropharynx is clear. Uvula midline. No oropharyngeal exudate or posterior oropharyngeal erythema.      Tonsils: 0 on the right. 0 on the left.   Cardiovascular:      Rate and Rhythm: Normal rate and regular rhythm.      Pulses: Normal pulses.      Heart sounds: Normal heart sounds.   Pulmonary:      Effort: Pulmonary effort is normal. No respiratory distress.      Breath sounds: Normal breath sounds. No stridor. No wheezing, rhonchi or rales.   Musculoskeletal:      Cervical back: Neck supple. No tenderness.   Lymphadenopathy:      Cervical: No cervical adenopathy.   Skin:     General: Skin is warm.   Neurological:      General: No focal deficit present.      Mental Status: She is alert.   Psychiatric:         Mood and Affect: Mood normal.         Assessment/Plan:     Diagnosis and associated orders:     1. Viral URI with cough  promethazine-dextromethorphan (PROMETHAZINE-DM) 6.25-15 MG/5ML syrup         Comments/MDM:     The patient presents with symptoms suspicious for likely viral upper respiratory infection. Differential includes bacterial pneumonia, sinusitis, allergic rhinitis. Do not suspect underlying cardiopulmonary process.  Patient is nontoxic appearing and not in need of emergent medical intervention. They have a normal pulse oximetry on room air, afebrile, and a normal pulmonary exam. Overall, the patient is very well appearing. I do not feel that this patient would benefit from antibiotics at this time.   Discussed likely viral etiology of symptoms.  Patient declined viral testing today in clinic.  Recommended symptomatic and supportive care at this time that includes plenty of fluids, rest, Tylenol/Ibuprofen for pain/fever, warm salt water gargles for sore throat, OTC cough and decongestant medication, Flonase, nasal saline washes.    Promethazine DM  sent to [facility] pharmacy for treatment of acute cough.  Patient counseled on sedative nature.  Return to clinic if symptoms worsen or fail to resolve.         Differential diagnosis, natural history, supportive care, and indications for immediate follow-up discussed.    Advised the patient to follow-up with the primary care physician for recheck, reevaluation, and consideration of further management.    Please note that this dictation was created using voice recognition software. I have made a reasonable attempt to correct obvious errors, but I expect that there are errors of grammar and possibly content that I did not discover before finalizing the note.    This note was electronically signed by EUGENE Vicente

## 2025-03-05 ENCOUNTER — OFFICE VISIT (OUTPATIENT)
Dept: URGENT CARE | Facility: CLINIC | Age: 50
End: 2025-03-05
Payer: MEDICAID

## 2025-03-05 VITALS
HEART RATE: 101 BPM | OXYGEN SATURATION: 93 % | HEIGHT: 64 IN | TEMPERATURE: 97 F | WEIGHT: 173 LBS | DIASTOLIC BLOOD PRESSURE: 60 MMHG | BODY MASS INDEX: 29.53 KG/M2 | RESPIRATION RATE: 20 BRPM | SYSTOLIC BLOOD PRESSURE: 104 MMHG

## 2025-03-05 DIAGNOSIS — J22 LRTI (LOWER RESPIRATORY TRACT INFECTION): ICD-10-CM

## 2025-03-05 DIAGNOSIS — J01.00 ACUTE NON-RECURRENT MAXILLARY SINUSITIS: ICD-10-CM

## 2025-03-05 DIAGNOSIS — J98.01 BRONCHOSPASM: ICD-10-CM

## 2025-03-05 PROCEDURE — 99213 OFFICE O/P EST LOW 20 MIN: CPT | Performed by: PHYSICIAN ASSISTANT

## 2025-03-05 PROCEDURE — 3074F SYST BP LT 130 MM HG: CPT | Performed by: PHYSICIAN ASSISTANT

## 2025-03-05 PROCEDURE — 3078F DIAST BP <80 MM HG: CPT | Performed by: PHYSICIAN ASSISTANT

## 2025-03-05 RX ORDER — DOXYCYCLINE HYCLATE 100 MG
100 TABLET ORAL 2 TIMES DAILY
Qty: 14 TABLET | Refills: 0 | Status: SHIPPED | OUTPATIENT
Start: 2025-03-05 | End: 2025-03-12

## 2025-03-05 RX ORDER — METHYLPREDNISOLONE 4 MG/1
TABLET ORAL
Qty: 21 TABLET | Refills: 0 | Status: SHIPPED | OUTPATIENT
Start: 2025-03-05

## 2025-03-05 RX ORDER — ALBUTEROL SULFATE 90 UG/1
2 INHALANT RESPIRATORY (INHALATION) EVERY 6 HOURS PRN
Qty: 8.5 G | Refills: 2 | Status: SHIPPED | OUTPATIENT
Start: 2025-03-05

## 2025-03-05 ASSESSMENT — ENCOUNTER SYMPTOMS
SPUTUM PRODUCTION: 1
ABDOMINAL PAIN: 0
SHORTNESS OF BREATH: 0
DIARRHEA: 0
VOMITING: 1
WHEEZING: 1
CHILLS: 0
NAUSEA: 0
FEVER: 0
SORE THROAT: 0
COUGH: 1

## 2025-03-05 NOTE — PROGRESS NOTES
"Subjective:   Deisy Galo  is a 49 y.o. female who presents for Congestion (Patient states that she has been sick for the last 12 days. States that now she is feeling congestion in her chest )      Cough  This is a new problem. The current episode started 1 to 4 weeks ago. Associated symptoms include wheezing. Pertinent negatives include no chills, ear pain, fever, rash, sore throat or shortness of breath.   Patient presents urgent care describing cough that is been present for over 12 days.  She was seen last week and found to be with a viral upper respiratory infection.  She was treated with cough suppressant which she states is minimally helpful.  She has had cough that is progressed to being more productive.  She describes an abnormal metallic taste with sputum that is changed recently.  She notes congestion to sinuses as well as in the chest.  Denies a history of asthma but describes wheezing and posttussive emesis.  Denies vomiting outside of coughing.  Denies sore throat or ear pain.  Has tried a number of over-the-counter products.  She denies a history of pneumonia.  She noted symptoms of fever and chills that went away and then came back.    Review of Systems   Constitutional:  Negative for chills and fever.   HENT:  Positive for congestion. Negative for ear pain and sore throat.    Respiratory:  Positive for cough, sputum production and wheezing. Negative for shortness of breath.    Gastrointestinal:  Positive for vomiting (Posttussive). Negative for abdominal pain, diarrhea and nausea.   Skin:  Negative for rash.       Allergies   Allergen Reactions    Latex Rash     Reaction at dentist office        Objective:   /60   Pulse (!) 101   Temp 36.1 °C (97 °F) (Temporal)   Resp 20   Ht 1.626 m (5' 4\")   Wt 78.5 kg (173 lb)   LMP 08/15/2020   SpO2 93%   BMI 29.70 kg/m²     Physical Exam  Vitals and nursing note reviewed.   Constitutional:       General: She is not in acute distress.     " Appearance: She is well-developed. She is not diaphoretic.   HENT:      Head: Normocephalic and atraumatic.      Right Ear: Tympanic membrane, ear canal and external ear normal. Tympanic membrane is not erythematous.      Left Ear: Tympanic membrane, ear canal and external ear normal. Tympanic membrane is not erythematous.      Nose: Nose normal.      Mouth/Throat:      Lips: Pink.      Mouth: Mucous membranes are moist.      Pharynx: Uvula midline. Posterior oropharyngeal erythema ( mild PND) present. No oropharyngeal exudate or uvula swelling.      Tonsils: No tonsillar abscesses.   Eyes:      General: Lids are normal. No scleral icterus.        Right eye: No discharge.         Left eye: No discharge.      Conjunctiva/sclera: Conjunctivae normal.   Pulmonary:      Effort: Pulmonary effort is normal. No respiratory distress.      Breath sounds: No stridor. Wheezing and rhonchi present. No decreased breath sounds or rales.   Musculoskeletal:         General: Normal range of motion.      Cervical back: Neck supple.   Skin:     General: Skin is warm and dry.      Coloration: Skin is not pale.      Findings: No erythema.   Neurological:      Mental Status: She is alert and oriented to person, place, and time. She is not disoriented.   Psychiatric:         Speech: Speech normal.         Behavior: Behavior normal.         Assessment/Plan:   1. LRTI (lower respiratory tract infection)  - methylPREDNISolone (MEDROL DOSEPAK) 4 MG Tablet Therapy Pack; Follow schedule on package instructions.  Dispense: 21 Tablet; Refill: 0    2. Acute non-recurrent maxillary sinusitis  - doxycycline (VIBRAMYCIN) 100 MG Tab; Take 1 Tablet by mouth 2 times a day for 7 days.  Dispense: 14 Tablet; Refill: 0    3. Bronchospasm  - albuterol 108 (90 Base) MCG/ACT Aero Soln inhalation aerosol; Inhale 2 Puffs every 6 hours as needed for Shortness of Breath.  Dispense: 8.5 g; Refill: 2  Supportive care is reviewed with patient/caregiver - recommend to  push PO fluids and electrolytes, Cautioned regarding potential side effects of steroid, avoid nsaids while using   take full course of Rx, take with probiotics, observe for resolution  Return to clinic with lack of resolution or progression of symptoms.      I have worn an N95 mask, gloves and eye protection for the entire encounter with this patient.     Differential diagnosis, natural history, supportive care, and indications for immediate follow-up discussed.

## 2025-03-11 ENCOUNTER — OFFICE VISIT (OUTPATIENT)
Dept: MEDICAL GROUP | Facility: PHYSICIAN GROUP | Age: 50
End: 2025-03-11
Payer: MEDICAID

## 2025-03-11 VITALS
OXYGEN SATURATION: 94 % | BODY MASS INDEX: 29.53 KG/M2 | HEART RATE: 78 BPM | HEIGHT: 64 IN | TEMPERATURE: 97.2 F | WEIGHT: 173 LBS

## 2025-03-11 DIAGNOSIS — E78.00 PURE HYPERCHOLESTEROLEMIA: ICD-10-CM

## 2025-03-11 DIAGNOSIS — Z12.12 SCREENING FOR COLORECTAL CANCER: ICD-10-CM

## 2025-03-11 DIAGNOSIS — Z12.31 ENCOUNTER FOR SCREENING MAMMOGRAM FOR BREAST CANCER: ICD-10-CM

## 2025-03-11 DIAGNOSIS — R05.2 SUBACUTE COUGH: ICD-10-CM

## 2025-03-11 DIAGNOSIS — Z00.00 HEALTHCARE MAINTENANCE: ICD-10-CM

## 2025-03-11 DIAGNOSIS — K21.00 GASTROESOPHAGEAL REFLUX DISEASE WITH ESOPHAGITIS WITHOUT HEMORRHAGE: ICD-10-CM

## 2025-03-11 DIAGNOSIS — E66.3 OVERWEIGHT (BMI 25.0-29.9): ICD-10-CM

## 2025-03-11 DIAGNOSIS — Z90.09 HISTORY OF LOBECTOMY OF THYROID: ICD-10-CM

## 2025-03-11 DIAGNOSIS — Z12.11 SCREENING FOR COLORECTAL CANCER: ICD-10-CM

## 2025-03-11 PROCEDURE — 99214 OFFICE O/P EST MOD 30 MIN: CPT

## 2025-03-11 RX ORDER — FAMOTIDINE 20 MG/1
20 TABLET, FILM COATED ORAL NIGHTLY
Qty: 90 TABLET | Refills: 1 | Status: SHIPPED | OUTPATIENT
Start: 2025-03-11

## 2025-03-11 RX ORDER — OMEPRAZOLE 40 MG/1
40 CAPSULE, DELAYED RELEASE ORAL DAILY
Qty: 90 CAPSULE | Refills: 1 | Status: SHIPPED | OUTPATIENT
Start: 2025-03-11

## 2025-03-11 ASSESSMENT — ENCOUNTER SYMPTOMS
SHORTNESS OF BREATH: 1
HEARTBURN: 1
FEVER: 0
DIZZINESS: 0
CHILLS: 0
DIARRHEA: 0
NAUSEA: 0
CONSTIPATION: 0
BLURRED VISION: 0
WEAKNESS: 0
VOMITING: 0
SPUTUM PRODUCTION: 1
ABDOMINAL PAIN: 0
MYALGIAS: 0
WEIGHT LOSS: 0
HEADACHES: 0
COUGH: 1

## 2025-03-11 ASSESSMENT — PATIENT HEALTH QUESTIONNAIRE - PHQ9: CLINICAL INTERPRETATION OF PHQ2 SCORE: 0

## 2025-03-11 NOTE — PROGRESS NOTES
Verbal consent was acquired by the patient to use [employer] ambient listening note generation during this visit  Subjective:     CC:  Diagnoses of Subacute cough, Encounter for screening mammogram for breast cancer, Screening for colorectal cancer, Pure hypercholesterolemia, Gastroesophageal reflux disease with esophagitis without hemorrhage, Overweight (BMI 25.0-29.9), Healthcare maintenance, and History of lobectomy of thyroid were pertinent to this visit.    HISTORY OF THE PRESENT ILLNESS: Patient is a 49 y.o. female.   Problem   Subacute Cough       History of Present Illness  The patient presents for evaluation of chest congestion, GERD, breast cancer, and thyroid disorder.    She has been experiencing chest congestion for approximately 15 days, initially attributed to influenza. A week following her flu diagnosis, she sought medical attention at an urgent care facility due to severe coughing episodes that induced vomiting. She was diagnosed with bronchitis and prescribed steroids, antibiotics, and an inhaler. Her condition has improved significantly; however, she continues to experience a residual cough and dyspnea during prolonged conversations or physical exertion such as gym workouts or ascending stairs. She is currently on doxycycline, with one dose remaining, and completed her prednisone course on Monday. Initially, the prednisone did not provide relief, but she noticed an improvement upon waking on Monday. Prior to this, she had been experiencing wheezing and severe coughing until Sunday.    She has not consulted her oncologist for over a year due to discontinuation of tamoxifen, which was causing adverse effects. She is requesting a mammogram, having not undergone one in the past year. She reports no current breast issues but notes a gradual return of sensation in the area where lymph nodes were removed. She was informed that occasional sharp pains are normal post-procedure. She does not report any  "depressive symptoms.    She reports worsening gastroesophageal reflux disease (GERD) symptoms, including dysphagia. She has previously been referred for an endoscopy but has not yet undergone the procedure. She experiences severe burning sensations during meals, which are particularly pronounced when lying down at night. She keeps Tums by her bedside for immediate relief, but finds them ineffective. She has tried various medications for her GERD, finding omeprazole to be the most effective.    She has a history of thyroid disorder, with only half of her thyroid remaining, which appears to be functioning adequately. She has been losing weight and maintains a high energy level.    MEDICATIONS  Current: albuterol, doxycycline, prednisone, omeprazole, famotidine  Discontinued: D3, turmeric, promethazine DM, Femara, ibuprofen, glucosamine, Aromasin, tamoxifen    ROS:   Review of Systems   Constitutional:  Positive for malaise/fatigue. Negative for chills, fever and weight loss.   HENT:  Positive for congestion.    Eyes:  Negative for blurred vision.   Respiratory:  Positive for cough, sputum production and shortness of breath.    Cardiovascular:  Negative for chest pain.   Gastrointestinal:  Positive for heartburn. Negative for abdominal pain, constipation, diarrhea, nausea and vomiting.   Musculoskeletal:  Negative for myalgias.   Neurological:  Negative for dizziness, weakness and headaches.         Objective:     Exam: Pulse 78   Temp 36.2 °C (97.2 °F) (Temporal)   Ht 1.626 m (5' 4\")   Wt 78.5 kg (173 lb)   SpO2 94%  Body mass index is 29.7 kg/m².    Physical Exam  Constitutional:       General: She is not in acute distress.     Appearance: Normal appearance. She is not ill-appearing or toxic-appearing.   HENT:      Head: Normocephalic.      Nose: Congestion present.   Eyes:      Conjunctiva/sclera: Conjunctivae normal.   Cardiovascular:      Rate and Rhythm: Normal rate and regular rhythm.      Pulses: Normal " pulses.      Heart sounds: Normal heart sounds. No murmur heard.  Pulmonary:      Effort: Pulmonary effort is normal. No respiratory distress.      Breath sounds: Rales present.   Skin:     General: Skin is warm and dry.   Neurological:      General: No focal deficit present.      Mental Status: She is alert and oriented to person, place, and time.   Psychiatric:         Mood and Affect: Mood normal.         Behavior: Behavior normal.           Labs:     Assessment & Plan: Medical Decision Making   49 y.o. female with the following -    Assessment & Plan  1. Chest congestion.  Her symptoms suggest a potential infection with either influenza B, RSV, or COVID-19, which may require a recovery period of 4 to 6 weeks. She is advised to continue her gym routine, albeit at a reduced intensity, and gradually increase her activity level as her strength improves. She is also advised to use albuterol prior to workouts to facilitate lung expansion. She is instructed to complete her doxycycline course and contact the office on Thursday if her symptoms persist. If necessary, an additional round of antibiotics and steroids will be considered.    2. Gastroesophageal reflux disease (GERD).  Her symptoms may indicate esophagitis without hemorrhage. She is advised to modify her diet to avoid foods that exacerbate her symptoms. A referral to gastroenterology will be made for an endoscopy. She is prescribed omeprazole 40 mg to be taken each morning on an empty stomach, and famotidine 20 mg for breakthrough symptoms at night.    3. History of Breast cancer.  A mammogram will be ordered. She is advised to continue her strength training regimen and take calcium, vitamin D3, K2, and magnesium supplements. She is also advised to maintain a high-calcium diet. Screening mammogram ordered.    4. Thyroid disorder.  Annual labs will be ordered. If her thyroid function is found to be abnormal, medication or a referral to an endocrinologist will be  considered.    5. Health maintenance.  A Cologuard test will be ordered for colorectal cancer screening. Annual labs will be ordered. A Pap smear and well-woman exam will be scheduled for 10/02/2025 at 5:00 PM.    Follow-up  The patient is scheduled for a follow-up visit on 10/02/2025 at 5:00 PM.      Problem List Items Addressed This Visit       History of lobectomy of thyroid    Relevant Orders    TSH+FREE T4    Gastroesophageal reflux disease with esophagitis without hemorrhage    Relevant Medications    omeprazole (PRILOSEC) 40 MG delayed-release capsule    famotidine (PEPCID) 20 MG Tab    Other Relevant Orders    Comp Metabolic Panel    CBC WITH DIFFERENTIAL    Referral to Gastroenterology    Pure hypercholesterolemia    Relevant Orders    Comp Metabolic Panel    Lipid Profile    Subacute cough     Other Visit Diagnoses         Encounter for screening mammogram for breast cancer        Relevant Orders    MA-SCREENING MAMMO BILAT W/TOMOSYNTHESIS W/CAD      Screening for colorectal cancer        Relevant Orders    Referral to GI for Colonoscopy      Overweight (BMI 25.0-29.9)        Relevant Orders    Comp Metabolic Panel    Lipid Profile    HEMOGLOBIN A1C    CBC WITH DIFFERENTIAL      Healthcare maintenance        Relevant Orders    Comp Metabolic Panel    Lipid Profile    HEMOGLOBIN A1C    CBC WITH DIFFERENTIAL    TSH+FREE T4            Differential diagnosis, natural history, supportive care, and indications for immediate follow-up discussed.  Shared decision making approach was utilized, and patient is amendable with plan of care.  Patient understands to return to clinic or go to the emergency department if symptoms worsen. All questions and concerns addressed to the best of my knowledge.      Return in about 6 months (around 9/11/2025), or if symptoms worsen or fail to improve, for Wellness with Pap.    Please note that this dictation was created using voice recognition software. I have made every reasonable  attempt to correct obvious errors, but I expect that there are errors of grammar and possibly content that I did not discover before finalizing the note.

## 2025-03-12 ENCOUNTER — APPOINTMENT (OUTPATIENT)
Dept: MEDICAL GROUP | Facility: PHYSICIAN GROUP | Age: 50
End: 2025-03-12
Payer: MEDICAID

## 2025-03-14 NOTE — Clinical Note
REFERRAL APPROVAL NOTICE         Sent on March 14, 2025                   Paige Galo  [address] [place] [address]                   Dear Ms. Clover,    After a careful review of the medical information and benefit coverage, [facility] has processed your referral. See below for additional details.    If applicable, you must be actively enrolled with your insurance for coverage of the authorized service. If you have any questions regarding your coverage, please contact your insurance directly.    REFERRAL INFORMATION   Referral #:  [id number]  Referred-To Provider    Referred-By Provider:  Gastroenterology    Salvatore Conner D.N.P.   [facility]      [address]  [place] [address] [phone] [address] [facility]  [place] [address]  [phone]    Referral Start Date:  03/11/2025  Referral End Date:   03/11/2026             SCHEDULING  If you do not already have an appointment, please call [phone] to make an appointment.     MORE INFORMATION  If you do not already have a [employer] account, sign up at: [email or web address]  You can access your medical information, make appointments, see lab results, billing information, and more.  If you have questions regarding this referral, please contact  the [facility] Referrals department at:             [phone]. Monday - Friday 8:00AM - 5:00PM.     Sincerely,    [facility]

## 2025-03-14 NOTE — Clinical Note
REFERRAL APPROVAL NOTICE         Sent on March 14, 2025                   Paige Galo  [address] [place] [address]                   Dear Ms. Clover,    After a careful review of the medical information and benefit coverage, [facility] has processed your referral. See below for additional details.    If applicable, you must be actively enrolled with your insurance for coverage of the authorized service. If you have any questions regarding your coverage, please contact your insurance directly.    REFERRAL INFORMATION   Referral #:  [id number]  Referred-To Provider    Referred-By Provider:  Gastroenterology    Salvatore Conner D.N.P.   [facility]      [address]  [place] [address]  [phone] [address] [facility]  [place] [address]  [phone]    Referral Start Date:  03/11/2025  Referral End Date:   03/11/2026             SCHEDULING  If you do not already have an appointment, please call [phone] to make an appointment.     MORE INFORMATION  If you do not already have a [employer] account, sign up at: [email or web address]  You can access your medical information, make appointments, see lab results, billing information, and more.  If you have questions regarding this referral, please contact  the [facility] Referrals department at:             [phone]. Monday - Friday 8:00AM - 5:00PM.     Sincerely,    [facility]

## 2025-03-28 ENCOUNTER — RESULTS FOLLOW-UP (OUTPATIENT)
Dept: MEDICAL GROUP | Facility: PHYSICIAN GROUP | Age: 50
End: 2025-03-28
Payer: MEDICAID

## 2025-03-28 ENCOUNTER — HOSPITAL ENCOUNTER (OUTPATIENT)
Dept: RADIOLOGY | Facility: MEDICAL CENTER | Age: 50
End: 2025-03-28
Payer: MEDICAID

## 2025-03-28 DIAGNOSIS — Z12.31 ENCOUNTER FOR SCREENING MAMMOGRAM FOR BREAST CANCER: ICD-10-CM

## 2025-03-28 PROCEDURE — 77067 SCR MAMMO BI INCL CAD: CPT

## 2025-04-01 ENCOUNTER — HOSPITAL ENCOUNTER (OUTPATIENT)
Dept: RADIOLOGY | Facility: MEDICAL CENTER | Age: 50
End: 2025-04-01
Payer: MEDICAID

## 2025-04-01 DIAGNOSIS — R92.8 ABNORMAL MAMMOGRAM: ICD-10-CM

## 2025-04-01 PROCEDURE — G0279 TOMOSYNTHESIS, MAMMO: HCPCS

## (undated) DEVICE — GOWN WARMING STANDARD FLEX - (30/CA)

## (undated) DEVICE — KIT ANESTHESIA W/CIRCUIT & 3/LT BAG W/FILTER (20EA/CA)

## (undated) DEVICE — NEEDLE NON SAFETY 25 GA X 1 1/2 IN HYPO (100EA/BX)

## (undated) DEVICE — SENSOR SPO2 NEO LNCS ADHESIVE (20/BX) SEE USER NOTES

## (undated) DEVICE — LACTATED RINGERS INJ 1000 ML - (14EA/CA 60CA/PF)

## (undated) DEVICE — GOWN SURGEONS X-LARGE - DISP. (30/CA)

## (undated) DEVICE — SHEET TRANSVERSE LAP - (12EA/CA)

## (undated) DEVICE — CHLORAPREP 26 ML APPLICATOR - ORANGE TINT(25/CA)

## (undated) DEVICE — BOVIE BLADE COATED &INSULATED - 25/PK

## (undated) DEVICE — SUCTION INSTRUMENT YANKAUER BULBOUS TIP W/O VENT (50EA/CA)

## (undated) DEVICE — WATER IRRIGATION STERILE 1000ML (12EA/CA)

## (undated) DEVICE — STAPLER SKIN DISP - (6/BX 10BX/CA) VISISTAT

## (undated) DEVICE — PROTECTOR ULNA NERVE - (36PR/CA)

## (undated) DEVICE — GLOVE BIOGEL SZ 6.5 SURGICAL PF LTX (50PR/BX 4BX/CA)

## (undated) DEVICE — APPLIER OPEN MEDIUM CLIP (6EA/BX)

## (undated) DEVICE — SUTURE 3-0 VICRYL PLUS RB-1 - 8 X 18 INCH (12/BX)

## (undated) DEVICE — AGENT HEMOSTATIC BELLOW HEMOBLAST (12EA/CA)

## (undated) DEVICE — SUTURE 3-0 VICRYL PLUS SH - 27 INCH (36/BX)

## (undated) DEVICE — SET EXTENSION WITH 2 PORTS (48EA/CA) ***PART #2C8610 IS A SUBSTITUTE*****

## (undated) DEVICE — CANISTER SUCTION RIGID RED 1500CC (40EA/CA)

## (undated) DEVICE — CLIP SM INTNL HRZN TI ESCP LGT - (24EA/PK 25PK/BX)

## (undated) DEVICE — NEPTUNE 4 PORT MANIFOLD - (20/PK)

## (undated) DEVICE — SUTURE 4-0 MONOCRYL PLUS PS-2 - 27 INCH (36/BX)

## (undated) DEVICE — TUBE CONNECTING SUCTION - CLEAR PLASTIC STERILE 72 IN (50EA/CA)

## (undated) DEVICE — SUTURE 3-0 VICRYL PLUS SH - 8X 18 INCH (12/BX)

## (undated) DEVICE — PROBE PRASS STAND STIMULATING (5EA/PK)

## (undated) DEVICE — PACK MINOR BASIN - (2EA/CA)

## (undated) DEVICE — HEAD HOLDER JUNIOR/ADULT

## (undated) DEVICE — CLOSURE SKIN STRIP 1/2 X 4 IN - (STERI STRIP) (50/BX 4BX/CA)

## (undated) DEVICE — PACK TRENGUARD 450 PROCEDURE (12EA/CA)

## (undated) DEVICE — ELECTRODE DUAL RETURN W/ CORD - (50/PK)

## (undated) DEVICE — TOWEL STOP TIMEOUT SAFETY FLAG (40EA/CA)

## (undated) DEVICE — CLIP MED INTNL HRZN TI ESCP - (25/BX)

## (undated) DEVICE — SYRINGE 30 ML LL (56/BX)

## (undated) DEVICE — TROCAR FIOS OPTICAL ACCESS SYSTEM 5 X 150MM (6/BX)

## (undated) DEVICE — DRAIN J-VAC 10MM FLAT - (10/CA)

## (undated) DEVICE — DRESSING TRANSPARENT FILM TEGADERM 4 X 4.75" (50EA/BX)"

## (undated) DEVICE — GLOVE BIOGEL PI INDICATOR SZ 6.5 SURGICAL PF LF - (50/BX 4BX/CA)

## (undated) DEVICE — TUBING CLEARLINK DUO-VENT - C-FLO (48EA/CA)

## (undated) DEVICE — DRAPE LAPAROTOMY T SHEET - (12EA/CA)

## (undated) DEVICE — MANIFOLD NEPTUNE 1 PORT (20/PK)

## (undated) DEVICE — KIT  I.V. START (100EA/CA)

## (undated) DEVICE — SODIUM CHL IRRIGATION 0.9% 1000ML (12EA/CA)

## (undated) DEVICE — GOWN SURGEONS LARGE - (32/CA)

## (undated) DEVICE — SPONGE PEANUT - (5/PK 50PK/CA)

## (undated) DEVICE — DRESSING TRANSPARENT FILM TEGADERM 2.375 X 2.75"  (100EA/BX)"

## (undated) DEVICE — STERI STRIP COMPOUND BENZOIN - TINCTURE 0.6ML WITH APPLICATOR (40EA/BX)

## (undated) DEVICE — BOVIE NEEDLE TIP 3CM COLORADO

## (undated) DEVICE — GLOVE SZ 6.5 BIOGEL PI MICRO - PF LF (50PR/BX)

## (undated) DEVICE — SLEEVE, VASO, THIGH, MED

## (undated) DEVICE — SYRINGE 10 ML CONTROL LL (25EA/BX 4BX/CA)

## (undated) DEVICE — MASK ANESTHESIA ADULT  - (100/CA)

## (undated) DEVICE — PACK LAPAROSCOPY - (1/CA)

## (undated) DEVICE — GLOVE SZ 7 BIOGEL PI MICRO - PF LF (50PR/BX 4BX/CA)

## (undated) DEVICE — SOD. CHL. INJ. 0.9% 250 ML - (36/CA 50CA/PF)

## (undated) DEVICE — CANISTER SUCTION 3000ML MECHANICAL FILTER AUTO SHUTOFF MEDI-VAC NONSTERILE LF DISP  (40EA/CA)

## (undated) DEVICE — DRAPE C-ARM LARGE 41IN X 74 IN - (10/BX 2BX/CA)

## (undated) DEVICE — RESERVOIR SUCTION 100 CC - SILICONE (20EA/CA)

## (undated) DEVICE — SUTURE 3-0 ETHILON PS-1 (36PK/BX)

## (undated) DEVICE — ELECTRODE 850 FOAM ADHESIVE - HYDROGEL RADIOTRNSPRNT (50/PK)

## (undated) DEVICE — DECANTER FLD BLS - (50/CA)

## (undated) DEVICE — SET LEADWIRE 5 LEAD BEDSIDE DISPOSABLE ECG (1SET OF 5/EA)

## (undated) DEVICE — SUTURE 4-0 VICRYL PLUS FS-2 - 27 INCH (36/BX)

## (undated) DEVICE — SUTURE GENERAL

## (undated) DEVICE — CATHETER IV 20 GA X 1-1/4 ---SURG.& SDS ONLY--- (50EA/BX)

## (undated) DEVICE — DRAPE MAGNETIC (INSTRA-MAG) - (30/CA)

## (undated) DEVICE — ARMREST CRADLE FOAM - (2PR/PK 12PR/CA)

## (undated) DEVICE — TRAY FOLEY CATHETER STATLOCK 16FR SURESTEP  (10EA/CA)

## (undated) DEVICE — SHEET THYROID - (10EA/CA)

## (undated) DEVICE — GELAQUASONIC 100 ULTRASOUND - 48/BX 20GM STERILE FOIL POUCH

## (undated) DEVICE — TUBE CONNECT SUCTION CLEAR 120 X 1/4" (50EA/CA)"

## (undated) DEVICE — TRAY SKIN SCRUB PVP WET (20EA/CA) PART #DYND70356 DISCONTINUED

## (undated) DEVICE — GLOVE BIOGEL SZ 8 SURGICAL PF LTX - (50PR/BX 4BX/CA)

## (undated) DEVICE — SPONGE GAUZESTER. 2X2 4-PL - (2/PK 50PK/BX 30BX/CS)

## (undated) DEVICE — SPONGE XRAY 8X4 STERL. 12PL - (10EA/TY 80TY/CA)

## (undated) DEVICE — DERMABOND ADVANCED - (12EA/BX)

## (undated) DEVICE — SLEEVE VASO CALF MED - (10PR/CA)

## (undated) DEVICE — PAD SANITARY 11IN MAXI IND WRAPPED  (12EA/PK 24PK/CA)

## (undated) DEVICE — SUTURE 2-0 PROLENE SH (36PK/BX)

## (undated) DEVICE — COVER CIV-FLEX TRANSDUCER - (24/BX)

## (undated) DEVICE — DRAPE LG. APERTURE 33 X 51" - (10EA/BX)"

## (undated) DEVICE — GLOVE BIOGEL INDICATOR SZ 6.5 SURGICAL PF LTX - (50PR/BX 4BX/CA)

## (undated) DEVICE — SUTURE 5-0 MONOCRYL PLUS PC-3 - (12/BX)

## (undated) DEVICE — SPONGE GAUZESTER 4 X 4 4PLY - (128PK/CA)

## (undated) DEVICE — TRAY SRGPRP PVP IOD WT PRP - (20/CA)

## (undated) DEVICE — TROCAR 5X100 NON BLADED Z-TH - READ KII (6/BX)

## (undated) DEVICE — MASK AIRWAY SIZE 4 UNIQUE SILICON (10EA/BX)

## (undated) DEVICE — DETERGENT RENUZYME PLUS 10 OZ PACKET (50/BX)

## (undated) DEVICE — BLADE SURGICAL #15 - (50/BX 3BX/CA)

## (undated) DEVICE — HANDPIECE THUNDERBEAT 5MM 35CM FRONT GRIP TYPE S (5EA/BX)

## (undated) DEVICE — BLADE SURGICAL #11 - (50/BX)

## (undated) DEVICE — CLIP HEMOCLIP TITANIUM SMALL - (12/BX)

## (undated) DEVICE — SYRINGE NON SAFETY TB 1 CC 27 GA X 1/2 IN (100/BX 8BX/CA)

## (undated) DEVICE — NEEDLE NON SAFETY HYPO 22 GA X 1 1/2 IN (100/BX)